# Patient Record
Sex: FEMALE | Race: WHITE | NOT HISPANIC OR LATINO | Employment: FULL TIME | URBAN - METROPOLITAN AREA
[De-identification: names, ages, dates, MRNs, and addresses within clinical notes are randomized per-mention and may not be internally consistent; named-entity substitution may affect disease eponyms.]

---

## 2017-01-13 RX ORDER — BUPROPION HYDROCHLORIDE 150 MG/1
TABLET ORAL
Qty: 30 TABLET | Refills: 0 | Status: SHIPPED | OUTPATIENT
Start: 2017-01-13 | End: 2017-02-14 | Stop reason: SDUPTHER

## 2017-02-14 ENCOUNTER — PATIENT MESSAGE (OUTPATIENT)
Dept: PSYCHIATRY | Facility: CLINIC | Age: 47
End: 2017-02-14

## 2017-02-14 RX ORDER — BUPROPION HYDROCHLORIDE 150 MG/1
TABLET ORAL
Qty: 30 TABLET | Refills: 0 | Status: SHIPPED | OUTPATIENT
Start: 2017-02-14 | End: 2017-03-28 | Stop reason: SDUPTHER

## 2017-03-28 ENCOUNTER — OFFICE VISIT (OUTPATIENT)
Dept: PSYCHIATRY | Facility: CLINIC | Age: 47
End: 2017-03-28
Payer: COMMERCIAL

## 2017-03-28 VITALS
HEIGHT: 68 IN | HEART RATE: 78 BPM | WEIGHT: 160 LBS | SYSTOLIC BLOOD PRESSURE: 121 MMHG | DIASTOLIC BLOOD PRESSURE: 74 MMHG | BODY MASS INDEX: 24.25 KG/M2

## 2017-03-28 DIAGNOSIS — F33.42 RECURRENT MAJOR DEPRESSIVE DISORDER, IN FULL REMISSION: Primary | ICD-10-CM

## 2017-03-28 DIAGNOSIS — F41.1 GENERALIZED ANXIETY DISORDER: ICD-10-CM

## 2017-03-28 PROCEDURE — 99999 PR PBB SHADOW E&M-EST. PATIENT-LVL II: CPT | Mod: PBBFAC,,, | Performed by: PSYCHIATRY & NEUROLOGY

## 2017-03-28 PROCEDURE — 1160F RVW MEDS BY RX/DR IN RCRD: CPT | Mod: S$GLB,,, | Performed by: PSYCHIATRY & NEUROLOGY

## 2017-03-28 PROCEDURE — 99214 OFFICE O/P EST MOD 30 MIN: CPT | Mod: S$GLB,,, | Performed by: PSYCHIATRY & NEUROLOGY

## 2017-03-28 RX ORDER — BUPROPION HYDROCHLORIDE 150 MG/1
150 TABLET ORAL DAILY
Qty: 30 TABLET | Refills: 5 | Status: SHIPPED | OUTPATIENT
Start: 2017-03-28 | End: 2017-10-02 | Stop reason: SDUPTHER

## 2017-03-28 RX ORDER — ESTRADIOL 1 MG/1
TABLET ORAL
Refills: 5 | COMMUNITY
Start: 2017-03-02 | End: 2017-06-14 | Stop reason: SDUPTHER

## 2017-03-28 NOTE — PROGRESS NOTES
"Ambulatory Psychiatry Established Patient Follow-up Note      Chief Complaint  presents for followup of depression, anxiety    Time Spent  25 minutes    HISTORY  Interval History  Mood is good.  One of her best friendsdied of cancer last week, Motivated her to make positive changes in her life. Also son went into the navy. Is taking an herbal supplement called Confidanzae by [employer], is an adaptogen. Feels much less anxious. Stopped lexapro a few months ago because feeling better. Her appetite has increased since starting it. Still taking wellbutrin. Denies significant alcohol use.     Prozac: tooth grinding, effexor: brain zaps if missing doses. Vistaril: hangover. Celexa and lexapro: excessive sedation, weight gain.     ROS   Complete review of systems performed covering Constitutional, Eyes, ENT/Mouth, Cardiovascular, Respiratory, Gastrointestinal, Genitourinary, Musculoskeletal, Skin, Neurologic, Endocrine, and Allergy/Immune. All systems were negative.    Psych ROS covered elsewhere in note (HPI)    PFSH  Past Medical History reviewed: Yes  Family History reviewed: No  Social History reviewed: Yes  Medications/problem list/allergies reviewed: Yes    Medications    Scheduled and PRN Medications     Current Outpatient Prescriptions:     buPROPion (WELLBUTRIN XL) 150 MG TB24 tablet, TAKE 1 TABLET BY MOUTH EVERY DAY, Disp: 30 tablet, Rfl: 0    duloxetine (CYMBALTA) 30 MG capsule, Take 1 capsule (30 mg total) by mouth once daily., Disp: 30 capsule, Rfl: 5    Allergies  Review of patient's allergies indicates:  No Known Allergies    EXAM  VITALS   Vitals:    03/28/17 1632   BP: 121/74   Pulse: 78   Weight: 72.6 kg (160 lb)   Height: 5' 8" (1.727 m)       RELEVANT LABS/STUDIES:    PSYCHIATRIC EXAMINATION  Appearance: well groomed, appearing healthy and of stated age    Behavior: cooperative, pleasant, no psychomotor retardation or agitation  Speech: normal rate, rhythm, prosody, volume and amount  Mood: " better  Affect: normal range  Thought Process: linear, logical, goal directed  Thought Content: negative for suicidal ideation, homicidal ideation, delusions or hallucinations.  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 Presentation consisten with Major Depressive Disorder, recurrent mild and Generalized Anxiety disorder. Pt also with extensive hx of adversity and losses which may have led to some negative coping skills including alcohol overuse which are worsening mood and anxiety sx. Perimenopausal mood changes likely contributing. Pt returns today with much improved mood,decreased anxiety, resolution of IBS and decreased alcohol use, following initiation of lexapro. However complaining of side effects of medications including weight gain, yawning and sedation even when taking at night. TRied wllbutrin to augment, pt reports good response to wellbutrin, stopped lexapro due to fatigue.      DIAGNOSES  Major Depressive Disorder, recurrent, in remission  Generalized Anxiety Disorder    R/O Alcohol use Disorder        PLAN  -  -continue wellbutrin  mg daily.  -counseled moderation of alcohol, AA  --Danie Gandhi  -return in three months for follow up.    More than 50% of the time was spent on counseling and coordination of care.  Supportive therapy, behavioral counseling on management of mood and anxiety

## 2017-06-14 RX ORDER — ESTRADIOL 1 MG/1
TABLET ORAL
Qty: 90 TABLET | Refills: 0 | Status: SHIPPED | OUTPATIENT
Start: 2017-06-14 | End: 2017-06-15 | Stop reason: SDUPTHER

## 2017-06-14 NOTE — TELEPHONE ENCOUNTER
----- Message from Carol Ann Beach sent at 6/14/2017  2:49 PM CDT -----  Contact: [phone]  PATIENT NEEDS REFILL OF ESTROGEN PILLS BEFORE HER APPOINTMENT DATE OF 7/27.  PLEASE CALL PATIENT TO REFILL SCRIPT.

## 2017-06-15 ENCOUNTER — OFFICE VISIT (OUTPATIENT)
Dept: FAMILY MEDICINE | Facility: CLINIC | Age: 47
End: 2017-06-15
Payer: COMMERCIAL

## 2017-06-15 VITALS
HEIGHT: 67 IN | DIASTOLIC BLOOD PRESSURE: 80 MMHG | WEIGHT: 153.63 LBS | RESPIRATION RATE: 16 BRPM | SYSTOLIC BLOOD PRESSURE: 120 MMHG | HEART RATE: 84 BPM | TEMPERATURE: 98 F | BODY MASS INDEX: 24.11 KG/M2 | OXYGEN SATURATION: 97 %

## 2017-06-15 DIAGNOSIS — Z79.890 HORMONE REPLACEMENT THERAPY: Primary | ICD-10-CM

## 2017-06-15 PROCEDURE — 99202 OFFICE O/P NEW SF 15 MIN: CPT | Mod: ,,, | Performed by: NURSE PRACTITIONER

## 2017-06-15 RX ORDER — ESTRADIOL 1 MG/1
1 TABLET ORAL DAILY
Qty: 90 TABLET | Refills: 0 | Status: SHIPPED | OUTPATIENT
Start: 2017-06-15 | End: 2017-07-13

## 2017-06-15 NOTE — PATIENT INSTRUCTIONS
Hormone Therapy for Women    Hormone therapy (HT) increases your levels of the hormones estrogen and progesterone. This can help reduce symptoms of menopause. HT may also help prevent osteoporosis in some women. But HT may increase risk for certain conditions, including blood clots, gallstones, heart disease, and stroke. However, HT may also reduce the risk of heart disease in some women.  How to take hormones  To get the best results, always take your hormones exactly as directed. Hormones can be taken in any of these ways:  · Pills containing estrogen, and sometimes other hormones, are taken as often as every day. This is the most common form of hormone therapy.  · A patch, spray, or gel releases estrogen into the bloodstream through the skin. There is also a patch that contains estrogen and progesterone. The patch can be worn on your hip. Most patches are changed once or twice a week.  · Cream used inside the vagina releases estrogen locally. Only a very small amount gets into the bloodstream. For this reason, vaginal creams can treat vaginal atrophy and dryness, but are not used to treat hot flashes. The creams do not significantly increase your risk for heart attack or stroke. The creams are usually used 2 to 3 times per week, but may be used daily. One type of vaginal ring (the Femring) does release enough estrogen that it gets into the bloodstream.  Follow-up visits  Have regular visits with a healthcare provider. These visits are a way to fine-tune your therapy. You can also be checked for any problems that might require you to stop HT.  Call your healthcare provider  If you have any of the following symptoms, call your healthcare provider:  · Unexpected vaginal bleeding  · A breast lump, or breast tenderness that doesnt go away  · Severe headaches  · Aching muscles in your back or legs  · Sudden pain in your legs or chest  · Shortness of breath   Date Last Reviewed: 5/10/2015  © 8721-6791 [employer]. [address] [place] [address], [place] [address]. All rights reserved. This information is not intended as a substitute for professional medical care. Always follow your healthcare professional's instructions.

## 2017-06-15 NOTE — PROGRESS NOTES
Subjective:       Patient ID: Saima Corral is a 46 y.o. female.    Chief Complaint: hormone therapy    45 y/o female presents to the clinic requesting a refill on her Estradiol until she can establish with another OBGYN. She denies any complaints or symptoms at this time.       Review of Systems   Constitutional: Negative for diaphoresis, fatigue and fever.   HENT: Negative for ear pain, rhinorrhea, sinus pressure, sore throat, trouble swallowing and voice change.    Eyes: Negative for photophobia and pain.   Respiratory: Negative for cough, chest tightness, shortness of breath and stridor.    Cardiovascular: Negative for chest pain, palpitations and leg swelling.   Gastrointestinal: Negative for abdominal pain, blood in stool, diarrhea, nausea and vomiting.   Genitourinary: Negative for dysuria, flank pain, frequency, pelvic pain, vaginal bleeding and vaginal discharge.   Musculoskeletal: Negative for joint swelling, neck pain and neck stiffness.   Skin: Negative for pallor and rash.   Neurological: Negative for dizziness, syncope, speech difficulty, weakness and numbness.   Psychiatric/Behavioral: Negative for confusion and suicidal ideas.       Past Medical History:   Diagnosis Date    Anxiety     Depression        Past Surgical History:   Procedure Laterality Date    HYSTERECTOMY      vaginal sling         History reviewed. No pertinent family history.    Social History     Social History    Marital status: Single     Spouse name: N/A    Number of children: N/A    Years of education: N/A     Social History Main Topics    Smoking status: Former Smoker    Smokeless tobacco: None    Alcohol use None    Drug use: Unknown    Sexual activity: Not Asked     Other Topics Concern    None     Social History Narrative    None       Current Outpatient Prescriptions   Medication Sig Dispense Refill    buPROPion (WELLBUTRIN XL) 150 MG TB24 tablet Take 1 tablet (150 mg total) by mouth once daily. 30 tablet 5     estradiol (ESTRACE) 1 MG tablet Take 1 tablet (1 mg total) by mouth once daily. 90 tablet 0     No current facility-administered medications for this visit.        Review of patient's allergies indicates:  No Known Allergies  Objective:       Vitals:    06/15/17 0920   BP: 120/80   Pulse: 84   Resp: 16   Temp: 98.4 °F (36.9 °C)     Physical Exam   Constitutional: She is oriented to person, place, and time. She appears well-developed and well-nourished.   HENT:   Head: Normocephalic and atraumatic.   Eyes: Conjunctivae and EOM are normal. Pupils are equal, round, and reactive to light.   Neck: Normal range of motion. Neck supple.   Cardiovascular: Normal rate, regular rhythm, normal heart sounds and intact distal pulses.    Pulmonary/Chest: Effort normal and breath sounds normal.   Musculoskeletal: Normal range of motion.   Neurological: She is alert and oriented to person, place, and time.   Skin: Skin is warm and dry. Capillary refill takes less than 2 seconds.   Psychiatric: She has a normal mood and affect. Her behavior is normal. Judgment and thought content normal.   Nursing note and vitals reviewed.      Assessment:       1. Hormone replacement therapy        Plan:     Hormone replacement therapy  -     estradiol (ESTRACE) 1 MG tablet; Take 1 tablet (1 mg total) by mouth once daily.  Dispense: 90 tablet; Refill: 0  -     Referral to Ramonita Caor MD (OBGYN)    Return if symptoms worsen or fail to improve.

## 2017-09-10 RX ORDER — ESTRADIOL 1 MG/1
TABLET ORAL
Qty: 90 TABLET | Refills: 0 | Status: SHIPPED | OUTPATIENT
Start: 2017-09-10 | End: 2018-03-29 | Stop reason: SDUPTHER

## 2017-10-02 RX ORDER — BUPROPION HYDROCHLORIDE 150 MG/1
TABLET ORAL
Qty: 30 TABLET | Refills: 0 | Status: SHIPPED | OUTPATIENT
Start: 2017-10-02 | End: 2017-11-13 | Stop reason: SDUPTHER

## 2017-11-13 RX ORDER — BUPROPION HYDROCHLORIDE 150 MG/1
TABLET ORAL
Qty: 30 TABLET | Refills: 0 | Status: SHIPPED | OUTPATIENT
Start: 2017-11-13 | End: 2018-01-24 | Stop reason: SDUPTHER

## 2018-01-24 ENCOUNTER — PATIENT MESSAGE (OUTPATIENT)
Dept: PSYCHIATRY | Facility: CLINIC | Age: 48
End: 2018-01-24

## 2018-01-24 RX ORDER — BUPROPION HYDROCHLORIDE 150 MG/1
TABLET ORAL
Qty: 30 TABLET | Refills: 0 | Status: SHIPPED | OUTPATIENT
Start: 2018-01-24 | End: 2018-01-25 | Stop reason: SDUPTHER

## 2018-01-25 RX ORDER — BUPROPION HYDROCHLORIDE 150 MG/1
TABLET ORAL
Qty: 30 TABLET | Refills: 0 | Status: SHIPPED | OUTPATIENT
Start: 2018-01-25 | End: 2018-04-17

## 2018-03-29 RX ORDER — ESTRADIOL 1 MG/1
TABLET ORAL
Qty: 30 TABLET | Refills: 0 | Status: SHIPPED | OUTPATIENT
Start: 2018-03-29 | End: 2018-04-17 | Stop reason: SDUPTHER

## 2018-04-17 ENCOUNTER — OFFICE VISIT (OUTPATIENT)
Dept: FAMILY MEDICINE | Facility: CLINIC | Age: 48
End: 2018-04-17
Payer: COMMERCIAL

## 2018-04-17 VITALS
BODY MASS INDEX: 24.33 KG/M2 | RESPIRATION RATE: 16 BRPM | DIASTOLIC BLOOD PRESSURE: 78 MMHG | OXYGEN SATURATION: 97 % | WEIGHT: 155 LBS | HEIGHT: 67 IN | SYSTOLIC BLOOD PRESSURE: 118 MMHG | HEART RATE: 86 BPM

## 2018-04-17 DIAGNOSIS — Z71.85 ENCOUNTER FOR COUNSELING REGARDING IMMUNIZATION: ICD-10-CM

## 2018-04-17 DIAGNOSIS — N95.1 HOT FLASHES DUE TO MENOPAUSE: Primary | ICD-10-CM

## 2018-04-17 DIAGNOSIS — Z12.31 ENCOUNTER FOR SCREENING MAMMOGRAM FOR BREAST CANCER: ICD-10-CM

## 2018-04-17 PROCEDURE — 90715 TDAP VACCINE 7 YRS/> IM: CPT | Mod: ,,, | Performed by: INTERNAL MEDICINE

## 2018-04-17 PROCEDURE — 99213 OFFICE O/P EST LOW 20 MIN: CPT | Mod: 25,,, | Performed by: INTERNAL MEDICINE

## 2018-04-17 PROCEDURE — 90471 IMMUNIZATION ADMIN: CPT | Mod: ,,, | Performed by: INTERNAL MEDICINE

## 2018-04-17 RX ORDER — PHENTERMINE HYDROCHLORIDE 37.5 MG/1
37.5 TABLET ORAL EVERY MORNING
Refills: 0 | COMMUNITY
Start: 2018-03-05 | End: 2018-04-17

## 2018-04-17 RX ORDER — ESTRADIOL 1 MG/1
1 TABLET ORAL DAILY
Qty: 90 TABLET | Refills: 3 | Status: SHIPPED | OUTPATIENT
Start: 2018-04-17 | End: 2019-06-19 | Stop reason: SDUPTHER

## 2018-04-17 NOTE — PATIENT INSTRUCTIONS
Menopause: Effects of Low Estrogen Levels  When your estrogen level falls, you may have symptoms. You also may be at a greater risk for osteoporosis and heart disease. Your diet, family health history, lifestyle, and other factors affect your symptoms and risks.  Symptoms of low estrogen  · Flashes, flushes, and night sweats are the most common symptoms of low estrogen. At times, blood rushes to your skins surface. This can give you a feeling of warmth (hot flash). Your face may look flushed. Hot flashes while you are sleeping are called night sweats.  · Mood swings are another effect of low estrogen. You may feel sad, anxious, or frustrated. Shifting hormone levels and night sweats may disrupt your sleep. This can cause fatigue, which may make mood swings worse.  · Thinning tissues may cause discomfort. Skin may appear more wrinkled. Thinning in the urinary tract may lead to bladder infections. You may also have an urgent need to urinate. Or, you may lose bladder control (incontinence). Thinning of the vagina may cause dryness and painful sex.  Major health risks of low estrogen  Osteoporosis: Estrogen helps maintain strong bones by preventing calcium loss. Too little calcium can increase the risk of fractures in the spine, hips, and leg and arm bones. Women who drink a lot of alcohol, who smoke, who are not active, and who are thin or petite are at greater risk. A family history of osteoporosis may also increase risk.  Heart disease: Estrogen made by the body seems to protect against heart disease. It may do this by raising the level of HDL (good) cholesterol in the blood. After menopause, the risk of heart disease rises sharply. Talk to your doctor about ways to protect your heart health.  How HT helps  Hormone therapy (HT) replaces hormones your body no longer produces. Because of this, it reduces some symptoms of menopause that are linked to low hormone levels. HT may also help prevent osteoporosis in some  women. But it may increase the risk of other health conditions, including heart disease, breast cancer, and stroke. You may not need HT--not all women do. Talk to your doctor about whether or not HT is right for you.   Date Last Reviewed: 5/13/2015 © 2000-2017 [employer]. [address] [place] [address], [place] [address]. All rights reserved. This information is not intended as a substitute for professional medical care. Always follow your healthcare professional's instructions.        Hormone Therapy for Women    Hormone therapy (HT) increases the levels of the hormones estrogen and progesterone in your body. This can help reduce symptoms of menopause. HT may also help prevent osteoporosis in some women. But HT may increase risk for certain conditions, including blood clots, gallstones, heart disease, and stroke. However, HT may also reduce the risk of heart disease in some women.  How to take hormones  To get the best results, always take your hormones exactly as directed. Hormones can be taken in any of these ways:  · Pills containing estrogen, and sometimes other hormones, are taken as often as every day. This is the most common form of hormone therapy.  · A patch, spray, or gel releases estrogen into the bloodstream through the skin. There is also a patch that contains estrogen and progesterone. The patch can be worn on your hip. Most patches are changed once or twice a week.  · Vaginal ring containing estrogen.  · Cream used inside the vagina releases estrogen locally. Only a very small amount gets into the bloodstream. For this reason, vaginal creams can treat vaginal atrophy and dryness, but are not used to treat hot flashes. The creams do not significantly increase your risk for heart attack or stroke. However, if used more than twice a week in other than very small doses, estrogen does get into the bloodstream in significant amounts. This may affect the uterus if present.  · Prolonged exposure of  estrogen in the blood without the use of a progestin increases the risk of cancer of the uterus.  Follow up visits  Have regular visits with a healthcare provider. These visits are a way to fine-tune your therapy. You can also be checked for any problems that might require you to stop HT.  Call your healthcare provider  If you have any of the following symptoms, call your healthcare provider:  · Unexpected vaginal bleeding  · A breast lump, or breast tenderness that doesnt go away  · Severe headaches  · Aching muscles in your back or legs  · Sudden pain in your legs or chest  · Shortness of breath   Date Last Reviewed: 2/1/2017  © 1778-9032 [employer]. [address] [place] [address], [place] [address]. All rights reserved. This information is not intended as a substitute for professional medical care. Always follow your healthcare professional's instructions.

## 2018-04-17 NOTE — PROGRESS NOTES
Subjective:       Patient ID: Saima Corral is a 47 y.o. female.    Chief Complaint: Medication Refill    Ms. Landaverde is a pleasant 47-year-old  female who works as a registered nurse at [employer]. She comes for follow-up.    Thus far she has not been diagnosed with any major medical issues like hypertension, diabetes or arthritis. She had a lipid panel checked in 2014 which had shown a HDL cholesterol of 120 and LDL cholesterol of less than 70.    She did have a partial hysterectomy in past and does get hot flashes. She takes estradiol for the same.    On social front-she did go through a phase of depression and anxiety in her life to a tumultuous and rough marriage. She is currently in the process of divorce and with a change in her job situation to clinical care she finds redemption and happiness. She has 3 grown children.    She is due for preventive care measures like mammogram and tetanus vaccination. [facility] influenza [facility].        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     Past Surgical History:   Procedure Laterality Date    HYSTERECTOMY      KNEE SURGERY      SHOULDER SURGERY      vaginal sling       Family History   Problem Relation Age of Onset    Asthma Mother     Depression Mother     Hypertension Mother     Heart disease Father  "    Depression Father     Hypertension Father        Review of Systems   Constitutional: Negative for activity change, chills, fatigue, fever and unexpected weight change.   HENT: Negative for congestion, postnasal drip and sinus pressure.    Eyes: Negative for pain, discharge and visual disturbance.   Respiratory: Negative for cough, chest tightness and shortness of breath.    Cardiovascular: Negative for chest pain, palpitations and leg swelling.   Gastrointestinal: Negative for abdominal distention, anal bleeding, constipation and diarrhea.   Genitourinary: Negative for difficulty urinating, dysuria, flank pain, frequency, menstrual problem, pelvic pain, vaginal bleeding and vaginal pain.   Musculoskeletal: Negative for arthralgias and joint swelling.   Skin: Negative for color change, pallor and rash.   Allergic/Immunologic: Negative for environmental allergies, food allergies and immunocompromised state.   Neurological: Negative for dizziness, tremors, seizures, syncope, light-headedness and headaches.   Hematological: Negative for adenopathy. Does not bruise/bleed easily.   Psychiatric/Behavioral: Negative for agitation, confusion and dysphoric mood. The patient is not nervous/anxious.        Objective:       Vitals:    04/17/18 1306 04/17/18 1344   BP: (!) 128/90 118/78   Pulse: 86    Resp: 16    SpO2: 97%    Weight: 70.3 kg (155 lb)    Height: 5' 7" (1.702 m)      Physical Exam   Constitutional: She is oriented to person, place, and time. She appears well-developed and well-nourished. She is cooperative. No distress.   HENT:   Head: Normocephalic and atraumatic.   Eyes: Conjunctivae, EOM and lids are normal. Pupils are equal, round, and reactive to light. Lids are everted and swept, no foreign bodies found. Right pupil is round and reactive. Left pupil is round and reactive.   Neck: Trachea normal and normal range of motion. Neck supple.       Right neck scar secondary to anterior cervical disc fusion "   Cardiovascular: Normal rate, regular rhythm, S1 normal, S2 normal and normal heart sounds.    Pulmonary/Chest: Breath sounds normal.   Abdominal: Soft. Bowel sounds are normal. There is no rigidity and no guarding.   Musculoskeletal: Normal range of motion.   Lymphadenopathy:     She has no cervical adenopathy.     She has no axillary adenopathy.        Right axillary: No pectoral adenopathy present.        Left axillary: No pectoral adenopathy present.       Right: Inguinal adenopathy present. No supraclavicular adenopathy present.        Left: Inguinal adenopathy present. No supraclavicular adenopathy present.   Nonspecific lymph nodes in the right inguinal region. Very nonspecific minimally palpable in the left inguinal region also.   Neurological: She is alert and oriented to person, place, and time.   Skin: Skin is warm and dry.   Psychiatric: She has a normal mood and affect. Her behavior is normal. Judgment and thought content normal.   Nursing note and vitals reviewed.      Assessment:       1. Hot flashes due to menopause    2. Encounter for screening mammogram for breast cancer    3. Encounter for counseling regarding immunization         Plan:           Hot flashes due to menopause  -     estradiol (ESTRACE) 1 MG tablet; Take 1 tablet (1 mg total) by mouth once daily.  Dispense: 90 tablet; Refill: 3    Encounter for screening mammogram for breast cancer  -     Mammo Digital Screening Bilat with CAD; Future; Expected date: 04/17/2018    Encounter for counseling regarding immunization  -     (In Office Administered) Tdap Vaccine    I did take the opportunity to review patient's lipid panel also. This is excellent with elevated HDL. Her blood glucose was also normal. This was done as a part of annual check offered by her employer [employer].    Follow-up in one-year time for annual physical.    Patient examined in presence of Ms. Nicole escobedo.

## 2018-05-24 ENCOUNTER — OFFICE VISIT (OUTPATIENT)
Dept: FAMILY MEDICINE | Facility: CLINIC | Age: 48
End: 2018-05-24
Payer: COMMERCIAL

## 2018-05-24 VITALS
HEIGHT: 67 IN | HEART RATE: 78 BPM | WEIGHT: 156 LBS | DIASTOLIC BLOOD PRESSURE: 74 MMHG | SYSTOLIC BLOOD PRESSURE: 120 MMHG | OXYGEN SATURATION: 99 % | BODY MASS INDEX: 24.48 KG/M2

## 2018-05-24 DIAGNOSIS — M62.830 MUSCLE SPASM OF BACK: ICD-10-CM

## 2018-05-24 DIAGNOSIS — M54.42 ACUTE MIDLINE LOW BACK PAIN WITH LEFT-SIDED SCIATICA: Primary | ICD-10-CM

## 2018-05-24 PROCEDURE — 99213 OFFICE O/P EST LOW 20 MIN: CPT | Mod: ,,, | Performed by: NURSE PRACTITIONER

## 2018-05-24 RX ORDER — METHOCARBAMOL 500 MG/1
500 TABLET, FILM COATED ORAL 2 TIMES DAILY
Qty: 60 TABLET | Refills: 0 | Status: SHIPPED | OUTPATIENT
Start: 2018-05-24 | End: 2018-08-28 | Stop reason: SDUPTHER

## 2018-05-24 NOTE — PATIENT INSTRUCTIONS
Back Care Tips    Caring for your back  These are things you can do to prevent a recurrence of acute back pain and to reduce symptoms from chronic back pain:  · Maintain a healthy weight. If you are overweight, losing weight will help most types of back pain.  · Exercise is an important part of recovery from most types of back pain. The muscles behind and in front of the spine support the back. This means strengthening both the back muscles and the abdominal muscles will provide better support for your spine.   · Swimming and brisk walking are good overall exercises to improve your fitness level.  · Practice safe lifting methods (below).  · Practice good posture when sitting, standing and walking. Avoid prolonged sitting. This puts more stress on the lower back than standing or walking.  · Wear quality shoes with sufficient arch support. Foot and ankle alignment can affect back symptoms. Women should avoid wearing high heels.  · Therapeutic massage can help relax the back muscles without stretching them.  · During the first 24 to 72 hours after an acute injury or flare-up of chronic back pain, apply an ice pack to the painful area for 20 minutes and then remove it for 20 minutes, over a period of 60 to 90 minutes, or several times a day. As a safety precaution, do not use a heating pad at bedtime. Sleeping on a heating pad can lead to skin burns or tissue damage.  · You can alternate ice and heat therapies.  Medications  Talk to your healthcare provider before using medicines, especially if you have other medical problems or are taking other medicines.  · You may use acetaminophen or ibuprofen to control pain, unless your healthcare provider prescribed other pain medicine. If you have chronic conditions like diabetes, liver or kidney disease, stomach ulcers, or gastrointestinal bleeding, or are taking blood thinners, talk with your healthcare provider before taking any medicines.  · Be careful if you are given  prescription pain medicines, narcotics, or medicine for muscle spasm. They can cause drowsiness, affect your coordination, reflexes, and judgment. Do not drive or operate heavy machinery while taking these types of medicines. Take prescription pain medicine only as prescribed by your healthcare provider.  Lumbar stretch  Here is a simple stretching exercise that will help relax muscle spasm and keep your back more limber. If exercise makes your back pain worse, dont do it.  · Lie on your back with your knees bent and both feet on the ground.  · Slowly raise your left knee to your chest as you flatten your lower back against the floor. Hold for 5 seconds.  · Relax and repeat the exercise with your right knee.  · Do 10 of these exercises for each leg.  Safe lifting method  · Dont bend over at the waist to lift an object off the floor.  Instead, bend your knees and hips in a squat.   · Keep your back and head upright  · Hold the object close to your body, directly in front of you.  · Straighten your legs to lift the object.   · Lower the object to the floor in the reverse fashion.  · If you must slide something across the floor, push it.  Posture tips  Sitting  Sit in chairs with straight backs or low-back support. Keep your knees lower than your hips, with your feet flat on the floor.  When driving, sit up straight. Adjust the seat forward so you are not leaning toward the steering wheel.  A small pillow or rolled towel behind your lower back may help if you are driving long distances.   Standing  When standing for long periods, shift most of your weight to one leg at a time. Alternate legs every few minutes.   Sleeping  The best way to sleep is on your side with your knees bent. Put a low pillow under your head to support your neck in a neutral spine position. Avoid thick pillows that bend your neck to one side. Put a pillow between your legs to further relax your lower back. If you sleep on your back, put pillows  under your knees to support your legs in a slightly flexed position. Use a firm mattress. If your mattress sags, replace it, or use a 1/2-inch plywood board under the mattress to add support.  Follow-up care  Follow up with your healthcare provider, or as advised.  If X-rays, a CT scan or an MRI scan were taken, they will be reviewed by a radiologist. You will be notified of any new findings that may affect your care.  Call 911  Seek emergency medical care if any of the following occur:  · Trouble breathing  · Confusion  · Very drowsy  · Fainting or loss of consciousness  · Rapid or very slow heart rate  · Loss of  bowel or bladder control  When to seek medical care  Call your healthcare provider if any of the following occur:  · Pain becomes worse or spreads to your arms or legs  · Weakness or numbness in one or both arms or legs  · Numbness in the groin area  Date Last Reviewed: 6/1/2016  © 4181-1267 [employer]. [address] [place] [address]. All rights reserved. This information is not intended as a substitute for professional medical care. Always follow your healthcare professional's instructions.        General Neck and Back Pain    Both neck and back pain are usually caused by injury to the muscles or ligaments of the spine. Sometimes the disks that separate each bone of the spine may cause pain by pressing on a nearby nerve. Back and neck pain may appear after a sudden twisting or bending force (such as in a car accident), or sometimes after a simple awkward movement. In either case, muscle spasm is often present and adds to the pain.  Acute neck and back pain usually gets better in 1 to 2 weeks. Pain related to disk disease, arthritis in the spinal joints or spinal stenosis (narrowing of the spinal canal) can become chronic and last for months or years.  Back and neck pain are common problems. Most people feel better in 1 or 2 weeks, and most of the rest in 1 to 2 months. Most  people can remain active.  People experience and describe pain differently.  · Pain can be sharp, stabbing, shooting, aching, cramping, or burning  · Movement, standing, bending, lifting, sitting, or walking may worsen the pain  · Pain can be localized to one spot or area, or it can be more generalized  · Pain can spread or radiate upwards, downwards, to the front, or go down your arms  · Muscle spasm may occur.  Most of the time mechanical problems with the muscles or spine cause the pain. it is usually caused by an injury, whether known or not, to the muscles or ligaments. While illnesses can cause back pain, it is usually not caused by a serious illness. Pain is usually related to physical activity, whether sports, exercise, work, or normal activity. Sometimes it can occur without an identifiable cause. This can happen simply by stretching or moving wrong, without noting pain at the time. Other causes include:  · Overexertion, lifting, pushing, pulling incorrectly or too aggressively.  · Sudden twisting, bending or stretching from an accident (car or fall), or accidental movement.  · Poor posture  · Poor conditioning, lack of regular exercise  · Spinal disc disease or arthritis  · Stress  · Pregnancy, or illness like appendicitis, bladder or kidney infection, pelvic infections   Home care  · For neck pain: Use a comfortable pillow that supports the head and keeps the spine in a neutral position. The position of the head should not be tilted forward or backward.  · When in bed, try to find a position of comfort. A firm mattress is best. Try lying flat on your back with pillows under your knees. You can also try lying on your side with your knees bent up towards your chest and a pillow between your knees.  · At first, do not try to stretch out the sore spots. If there is a strain, it is not like the good soreness you get after exercising without an injury. In this case, stretching may make it worse.  · Avoid  prolonged sitting, long car rides or travel. This puts more stress on the lower back than standing or walking.  · During the first 24 to 72 hours after an injury, apply an ice pack to the painful area for 20 minutes and then remove it for 20 minutes over a period of 60 to 90 minutes or several times a day.   · You can alternate ice and heat therapies. Talk with your healthcare provider about the best treatment for your back or neck pain. As a safety precaution, do not use a heating pad at bedtime. Sleeping with a heating pad can lead to skin burns or tissue damage.  · Therapeutic massage can help relax the back and neck muscles without stretching them.  · Be aware of safe lifting methods and do not lift anything over 15 pounds until all the pain is gone.  Medications  Talk to your healthcare provider before using medicine, especially if you have other medical problems or are taking other medicines.  · You may use over-the-counter medicine to control pain, unless another pain medicine was prescribed. If you have chronic conditions like diabetes, liver or kidney disease, stomach ulcers,  gastrointestinal bleeding, or are taking blood thinner medicines.  · Be careful if you are given pain medicines, narcotics, or medicine for muscle spasm. They can cause drowsiness, and can affect your coordination, reflexes, and judgment. Do not drive or operate heavy machinery.  Follow-up care  Follow up with your healthcare provider, or as advised. Physical therapy or further tests may be needed.  If X-rays were taken, you will be notified of any new findings that may affect your care.  Call 911  Seek emergency medical care if any of the following occur:  · Trouble breathing  · Confusion  · Very drowsy or trouble awakening  · Fainting or loss of consciousness  · Rapid or very slow heart rate  · Loss of bowel or bladder control  When to seek medical advice  Call your healthcare provider right away if any of these occur:  · Pain  becomes worse or spreads into your arms or legs  · Weakness, numbness or pain in one or both arms or legs  · Numbness in the groin area  · Difficulty walking  · Fever of 100.4ºF (38ºC) or higher, or as directed by your healthcare provider  Date Last Reviewed: 7/1/2016  © 3019-3431 [employer]. [address] [place] [address]. All rights reserved. This information is not intended as a substitute for professional medical care. Always follow your healthcare professional's instructions.

## 2018-05-24 NOTE — PROGRESS NOTES
Subjective:       Patient ID: Saima Corral is a 47 y.o. female.    Chief Complaint: Back Pain (last night pain scale was at a 9)    Patient is new to me.  Patient presents with low back pain for 3 weeks.  Patient states she flew on an airplane 3 weeks ago and back pain has been persistent since that time.  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  Patient complains of midline low back pain with muscle spasms to the left side and radiation to the left buttock and down the leg.  NSAIDs help somewhat.      Back Pain   This is a new problem. The current episode started 1 to 4 weeks ago. The problem occurs daily. The problem has been waxing and waning since onset. The pain is present in the lumbar spine. The quality of the pain is described as aching. Radiates to: left buttock. The pain is at a severity of 6/10 (4-9 on a 0-10 pain scale depending on activity level). The pain is moderate. The pain is worse during the night. The symptoms are aggravated by stress, bending, sitting and twisting. Pertinent negatives include no abdominal pain, bladder incontinence, bowel incontinence, chest pain, dysuria, fever, headaches, leg pain, numbness, paresis, paresthesias, pelvic pain, perianal numbness, tingling, weakness or weight loss. Risk factors include lack of exercise. She has tried heat, NSAIDs, home exercises, analgesics and bed rest for the symptoms. The treatment provided mild relief.     Review of Systems   Constitutional: Negative for activity change, appetite change, fever and weight loss.   HENT: Negative for congestion, ear discharge, ear pain, sore throat, trouble swallowing and voice change.    Eyes: Negative for photophobia, pain, discharge and visual disturbance.   Respiratory: Negative for cough, chest tightness and shortness of breath.    Cardiovascular: Negative for chest pain and palpitations.   Gastrointestinal: Negative for abdominal pain, bowel incontinence, nausea and  vomiting.   Endocrine: Negative for cold intolerance and heat intolerance.   Genitourinary: Negative for bladder incontinence, difficulty urinating, dysuria and pelvic pain.   Musculoskeletal: Positive for back pain. Negative for arthralgias and gait problem.   Skin: Negative for rash.   Allergic/Immunologic: Negative for immunocompromised state.   Neurological: Negative for tingling, speech difficulty, weakness, numbness, headaches and paresthesias.   Psychiatric/Behavioral: Negative for confusion, self-injury and suicidal ideas.       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       Current Outpatient Prescriptions   Medication Sig Dispense Refill    estradiol (ESTRACE) 1 MG tablet Take 1 tablet (1 mg total) by mouth once daily. 90 tablet 3    methocarbamol (ROBAXIN) 500 MG Tab Take 1 tablet (500 mg total) by mouth 2 (two) times daily. 60 tablet 0     No current facility-administered medications for this visit.        Review of patient's allergies indicates:  No Known Allergies  Objective:    HPI   "   Back Pain    Additional comments: last night pain scale was at a 9       Last edited by Nicole Bonilla LPN on 5/24/2018 10:41 AM. (History)      Blood pressure 120/74, pulse 78, height 5' 7" (1.702 m), weight 70.8 kg (156 lb), SpO2 99 %. Body mass index is 24.43 kg/m².   Physical Exam   Constitutional: She is oriented to person, place, and time. She appears well-developed. She is cooperative. No distress.   HENT:   Head: Normocephalic and atraumatic.   Right Ear: Tympanic membrane normal.   Left Ear: Tympanic membrane normal.   Eyes: Conjunctivae, EOM and lids are normal. Pupils are equal, round, and reactive to light. Lids are everted and swept, no foreign bodies found. Right pupil is round and reactive. Left pupil is round and reactive.   Neck: Trachea normal and normal range of motion. Neck supple.   Cardiovascular: Normal rate, regular rhythm, S1 normal, S2 normal, normal heart sounds and intact distal pulses.    Pulmonary/Chest: Effort normal and breath sounds normal.   Abdominal: Soft. Bowel sounds are normal. There is no rigidity and no guarding.   Musculoskeletal: Normal range of motion.        Left upper leg: She exhibits tenderness. She exhibits no edema.        Legs:  Muscular tightness noted to lateral area of pain (left sided) on the low back.    Straight leg test positive for pain on the left leg.  Right side negative.   Lymphadenopathy:     She has no cervical adenopathy.     She has no axillary adenopathy.   Neurological: She is alert and oriented to person, place, and time.   Skin: Skin is warm and dry. Capillary refill takes less than 2 seconds.   Psychiatric: She has a normal mood and affect. Her behavior is normal. Judgment and thought content normal.   Nursing note and vitals reviewed.          Assessment:       1. Acute midline low back pain with left-sided sciatica    2. Muscle spasm of back        Plan:       Saima was seen today for back pain.    Diagnoses and all orders for this " visit:    Acute midline low back pain with left-sided sciatica  Physical therapy recommended but patient denies as she states she does not have the time for physical therapy.    Muscle spasm of back  -     methocarbamol (ROBAXIN) 500 MG Tab; Take 1 tablet (500 mg total) by mouth 2 (two) times daily.       Try muscle relaxer and light stretching for the next 4-6 weeks.  If worsening severely at any point or if not improved by 6 weeks return to the clinic for re-evaluation and possible imaging.

## 2018-08-28 ENCOUNTER — OFFICE VISIT (OUTPATIENT)
Dept: FAMILY MEDICINE | Facility: CLINIC | Age: 48
End: 2018-08-28
Payer: COMMERCIAL

## 2018-08-28 VITALS
SYSTOLIC BLOOD PRESSURE: 103 MMHG | DIASTOLIC BLOOD PRESSURE: 80 MMHG | HEIGHT: 67 IN | WEIGHT: 157 LBS | BODY MASS INDEX: 24.64 KG/M2 | HEART RATE: 85 BPM

## 2018-08-28 DIAGNOSIS — M62.830 MUSCLE SPASM OF BACK: ICD-10-CM

## 2018-08-28 DIAGNOSIS — M54.42 CHRONIC LEFT-SIDED LOW BACK PAIN WITH LEFT-SIDED SCIATICA: Primary | ICD-10-CM

## 2018-08-28 DIAGNOSIS — G89.29 CHRONIC LEFT-SIDED LOW BACK PAIN WITH LEFT-SIDED SCIATICA: Primary | ICD-10-CM

## 2018-08-28 PROCEDURE — 99214 OFFICE O/P EST MOD 30 MIN: CPT | Mod: ,,, | Performed by: INTERNAL MEDICINE

## 2018-08-28 RX ORDER — METHOCARBAMOL 500 MG/1
500 TABLET, FILM COATED ORAL DAILY
Qty: 30 TABLET | Refills: 1 | Status: SHIPPED | OUTPATIENT
Start: 2018-08-28 | End: 2019-05-20

## 2018-08-28 RX ORDER — MELOXICAM 15 MG/1
1 TABLET ORAL DAILY PRN
Refills: 0 | COMMUNITY
Start: 2018-07-26 | End: 2019-05-20

## 2018-08-28 RX ORDER — CYCLOBENZAPRINE HCL 10 MG
10 TABLET ORAL NIGHTLY
Qty: 30 TABLET | Refills: 1 | Status: SHIPPED | OUTPATIENT
Start: 2018-08-28 | End: 2019-08-19 | Stop reason: ALTCHOICE

## 2018-08-28 RX ORDER — CYCLOBENZAPRINE HCL 10 MG
1 TABLET ORAL DAILY PRN
Refills: 0 | COMMUNITY
Start: 2018-07-26 | End: 2018-08-28 | Stop reason: SDUPTHER

## 2018-08-28 RX ORDER — TRAMADOL HYDROCHLORIDE 50 MG/1
1 TABLET ORAL DAILY PRN
Refills: 0 | COMMUNITY
Start: 2018-07-26 | End: 2018-08-28 | Stop reason: SDUPTHER

## 2018-08-28 RX ORDER — TRAMADOL HYDROCHLORIDE 50 MG/1
50 TABLET ORAL EVERY 12 HOURS PRN
Qty: 15 TABLET | Refills: 0 | Status: SHIPPED | OUTPATIENT
Start: 2018-08-28 | End: 2019-05-20

## 2018-08-28 NOTE — PROGRESS NOTES
Subjective:       Patient ID: Saima Corral is a 48 y.o. female.    Chief Complaint: Back Pain (x 3 months )    Ms. Saima Corral is a pleasant 48-year-old  female who has been suffering from low back pain mostly on the left side for more than several months. She does not clearly recall any clear-cut history of trauma except for falling in a bathtub perhaps an 9-year-old so back. She did have degenerative arthritis of the neck for which she had ACDF done by Dr. Pineda years ago.    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    She is status post hysterectomy. There is no history of cancer. No fever. No bowel or bladder disturbance. No numbness or paresthesias. No weight loss or significant weight gain.    She has not tried any formal physical therapy because of lack of time. She has tried some over-the-counter medications and in past she has found relief from Robaxin taken in the morning and Flexeril in the evening which helped to induce sleep. Since she has run out of Flexeril at this point she has resorted to drinking some alcohol to help her rest. No illicit substance abuse.      Back Pain   This is a recurrent problem. The current episode started more than 1 month ago. The problem occurs constantly. The problem has been waxing and waning since onset. The pain is present in the lumbar spine and sacro-iliac. The quality of the pain is described as aching and cramping. The pain radiates to the left thigh. The pain is moderate. The pain is worse during the day (when bending to pick pts). Pertinent negatives include no abdominal pain, chest pain, dysuria, fever, headaches, numbness, pelvic pain or weakness. Risk factors: H/O fall years ago in bath tub. She has tried analgesics for the symptoms. The treatment provided mild relief.       Past Medical History:   Diagnosis Date    Anxiety     Depression      Social History      Socioeconomic History    Marital status:      Spouse name: Not on file    Number of children: 3    Years of education: Not on file    Highest education level: Not on file   Social Needs    Financial resource strain: Not on file    Food insecurity - worry: Not on file    Food insecurity - inability: Not on file    Transportation needs - medical: Not on file    Transportation needs - non-medical: Not on file   Occupational History    Occupation: Registered Nurse      Employer: [employer]     Comment: Cancer Center   Tobacco Use    Smoking status: Former Smoker     Last attempt to quit: 2007     Years since quittin.6    Smokeless tobacco: Never Used   Substance and Sexual Activity    Alcohol use: Yes    Drug use: No    Sexual activity: Yes     Partners: Male   Other Topics Concern    Not on file   Social History Narrative    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     Past Surgical History:   Procedure Laterality Date    HYSTERECTOMY      KNEE SURGERY      SHOULDER SURGERY      vaginal sling       Family History   Problem Relation Age of Onset    Asthma Mother     Depression Mother     Hypertension Mother     Heart disease Father     Depression Father     Hypertension Father        Review of Systems   Constitutional: Negative for activity change, appetite change and fever.   HENT: Negative for congestion, ear discharge, ear pain, sore throat, trouble swallowing and voice change.    Eyes: Negative for photophobia, pain, discharge and visual disturbance.   Respiratory: Negative for cough, chest tightness and shortness of breath.    Cardiovascular: Negative for chest pain and palpitations.   Gastrointestinal: Negative for abdominal pain, nausea and vomiting.   Endocrine: Negative for cold intolerance and heat intolerance.   Genitourinary: Negative for difficulty urinating, dysuria and pelvic pain.   Musculoskeletal: Positive for back pain.  "Negative for arthralgias and gait problem.   Skin: Negative for rash.   Allergic/Immunologic: Negative for immunocompromised state.   Neurological: Negative for speech difficulty, weakness, numbness and headaches.   Psychiatric/Behavioral: Negative for confusion, self-injury and suicidal ideas.         Objective:      Blood pressure 103/80, pulse 85, height 5' 7" (1.702 m), weight 71.2 kg (157 lb). Body mass index is 24.59 kg/m².  Physical Exam   Constitutional: She appears well-developed and well-nourished. She is cooperative. No distress.   HENT:   Head: Normocephalic and atraumatic.   Eyes: Conjunctivae and lids are normal. Pupils are equal, round, and reactive to light. Lids are everted and swept, no foreign bodies found. Right pupil is round and reactive. Left pupil is round and reactive.   Neck: Trachea normal and normal range of motion. Neck supple.       Right neck scar secondary to anterior cervical disc fusion   Cardiovascular: Normal rate, regular rhythm, S1 normal, S2 normal and normal heart sounds.   Pulmonary/Chest: Breath sounds normal.   Abdominal: Soft. Bowel sounds are normal. There is no rigidity and no guarding.   Musculoskeletal:        Lumbar back: She exhibits tenderness and spasm. She exhibits normal range of motion.        Back:    No obvious deformities noted except probably for mild curvature in the lumbar spine.    Mild-to-moderate paraspinal spasm is noted.    Patient is areflexic symmetrically.    Motor strength is normal. SLR is more positive on the right side as compared to the left side.    Peripheral pulses are palpable.   Neurological: She is alert. She displays no atrophy and no tremor. She exhibits normal muscle tone. She displays no seizure activity.   Reflex Scores:       Tricep reflexes are 0 on the right side and 0 on the left side.       Bicep reflexes are 0 on the right side and 0 on the left side.       Brachioradialis reflexes are 0 on the right side and 0 on the left " side.       Patellar reflexes are 0 on the right side and 0 on the left side.       Achilles reflexes are 0 on the right side and 0 on the left side.  Skin: Skin is warm and dry.   Psychiatric: She has a normal mood and affect. Her behavior is normal. Judgment and thought content normal.   Nursing note and vitals reviewed.       has been reviewed and pt will be discussed about it.      Assessment:       1. Chronic left-sided low back pain with left-sided sciatica    2. Muscle spasm of back           No visits with results within 3 Month(s) from this visit.   Latest known visit with results is:   No results found for any previous visit.         Plan:           Chronic left-sided low back pain with left-sided sciatica  -     methocarbamol (ROBAXIN) 500 MG Tab; Take 1 tablet (500 mg total) by mouth once daily.  Dispense: 30 tablet; Refill: 1  -     traMADol (ULTRAM) 50 mg tablet; Take 1 tablet (50 mg total) by mouth every 12 (twelve) hours as needed.  Dispense: 15 tablet; Refill: 0  -     X-Ray Lumbar Spine Complete 5 View; Future; Expected date: 08/28/2018  -     Ambulatory Referral to Physical/Occupational Therapy    Muscle spasm of back  -     cyclobenzaprine (FLEXERIL) 10 MG tablet; Take 1 tablet (10 mg total) by mouth every evening.  Dispense: 30 tablet; Refill: 1  -     Ambulatory Referral to Physical/Occupational Therapy      Plan is to start over the x-ray of lumbar spine because of persistent pain for more than 6 months.    She will probably benefit from physical therapy with or without chiropractor.    Back posture, exercises have been discussed.    Will discuss about sourcing controlled prescriptions from multiple sources.    Follow-up in 6 weeks or earlier as needed.      Patient seen with Ms Vicente as Chaperone      Current Outpatient Medications:     estradiol (ESTRACE) 1 MG tablet, Take 1 tablet (1 mg total) by mouth once daily., Disp: 90 tablet, Rfl: 3    meloxicam (MOBIC) 15 MG tablet, 1 tablet  daily as needed., Disp: , Rfl: 0    methocarbamol (ROBAXIN) 500 MG Tab, Take 1 tablet (500 mg total) by mouth once daily., Disp: 30 tablet, Rfl: 1    traMADol (ULTRAM) 50 mg tablet, Take 1 tablet (50 mg total) by mouth every 12 (twelve) hours as needed., Disp: 15 tablet, Rfl: 0    cyclobenzaprine (FLEXERIL) 10 MG tablet, Take 1 tablet (10 mg total) by mouth every evening., Disp: 30 tablet, Rfl: 1

## 2018-08-28 NOTE — PATIENT INSTRUCTIONS
Exercises to Strengthen Your Lower Back  Strong lower back and abdominal muscles work together to support your spine. The exercises below will help strengthen the lower back. It is important that you begin exercising slowly and increase levels gradually.  Always begin any exercise program with stretching. If you feel pain while doing any of these exercises, stop and talk to your doctor about a more specific exercise program that better suits your condition.   Low back stretch  The point of stretching is to make you more flexible and increase your range of motion. Stretch only as much as you are able. Stretch slowly. Do not push your stretch to the limit. If at any point you feel pain while stretching, this is your (temporary) limit.  · Lie on your back with your knees bent and both feet on the ground.  · Slowly raise your left knee to your chest as you flatten your lower back against the floor. Hold for 5 seconds.  · Relax and repeat the exercise with your right knee.  · Do 10 of these exercises for each leg.  · Repeat hugging both knees to your chest at the same time.  Building lower back strength  Start your exercise routine with 10 to 30 minutes a day, 1 to 3 times a day.  Initial exercises  Lying on your back:  1. Ankle pumps: Move your foot up and down, towards your head, and then away. Repeat 10 times with each foot.  2. Heel slides: Slowly bend your knee, drawing the heel of your foot towards you. Then slide your heel/foot from you, straightening your knee. Do not lift your foot off the floor (this is not a leg lift).  3. Abdominal contraction: Bend your knees and put your hands on your stomach. Tighten your stomach muscles. Hold for 5 seconds, then relax. Repeat 10 times.  4. Straight leg raise: Bend one leg at the knee and keep the other leg straight. Tighten your stomach muscles. Slowly lift your straight leg 6 to 12 inches off the floor and hold for up to 5 seconds. Repeat 10 times on each  side.  Standin. Wall squats: Stand with your back against the wall. Move your feet about 12 inches away from the wall. Tighten your stomach muscles, and slowly bend your knees until they are at about a 45 degree angle. Do not go down too far. Hold about 5 seconds. Then slowly return to your starting position. Repeat 10 times.  2. Heel raises: Stand facing the wall. Slowly raise the heels of your feet up and down, while keeping your toes on the floor. If you have trouble balancing, you can touch the wall with your hands. Repeat 10 times.  More advanced exercises  When you feel comfortable enough, try these exercises.  1. Kneeling lumbar extension: Begin on your hands and knees. At the same time, raise and straighten your right arm and left leg until they are parallel to the ground. Hold for 2 seconds and come back slowly to a starting position. Repeat with left arm and right leg, alternating 10 times.  2. Prone lumbar extension: Lie face down, arms extended overhead, palms on the floor. At the same time, raise your right arm and left leg as high as comfortably possible. Hold for 10 seconds and slowly return to start. Repeat with left arm and right leg, alternating 10 times. Gradually build up to 20 times. (Advanced: Repeat this exercise raising both arms and both legs a few inches off the floor at the same time. Hold for 5 seconds and release.)  3. Pelvic tilt: Lie on the floor on your back with your knees bent at 90 degrees. Your feet should be flat on the floor. Inhale, exhale, then slowly contract your abdominal muscles bringing your navel toward your spine. Let your pelvis rock back until your lower back is flat on the floor. Hold for 10 seconds while breathing smoothly.  4. Abdominal crunch: Perform a pelvic tilt (above) flattening your lower back against the floor. Holding the tension in your abdominal muscles, take another breath and raise your shoulder blades off the ground (this is not a full sit-up).  Keep your head in line with your body (dont bend your neck forward). Hold for 2 seconds, then slowly lower.  Date Last Reviewed: 6/1/2016 © 2000-2017 [employer]. [address] [place] [address], [place] [address]. All rights reserved. This information is not intended as a substitute for professional medical care. Always follow your healthcare professional's instructions.        Back Care Tips    Caring for your back  These are things you can do to prevent a recurrence of acute back pain and to reduce symptoms from chronic back pain:  · Maintain a healthy weight. If you are overweight, losing weight will help most types of back pain.  · Exercise is an important part of recovery from most types of back pain. The muscles behind and in front of the spine support the back. This means strengthening both the back muscles and the abdominal muscles will provide better support for your spine.   · Swimming and brisk walking are good overall exercises to improve your fitness level.  · Practice safe lifting methods (below).  · Practice good posture when sitting, standing and walking. Avoid prolonged sitting. This puts more stress on the lower back than standing or walking.  · Wear quality shoes with sufficient arch support. Foot and ankle alignment can affect back symptoms. Women should avoid wearing high heels.  · Therapeutic massage can help relax the back muscles without stretching them.  · During the first 24 to 72 hours after an acute injury or flare-up of chronic back pain, apply an ice pack to the painful area for 20 minutes and then remove it for 20 minutes, over a period of 60 to 90 minutes, or several times a day. As a safety precaution, do not use a heating pad at bedtime. Sleeping on a heating pad can lead to skin burns or tissue damage.  · You can alternate ice and heat therapies.  Medications  Talk to your healthcare provider before using medicines, especially if you have other medical problems or are  taking other medicines.  · You may use acetaminophen or ibuprofen to control pain, unless your healthcare provider prescribed other pain medicine. If you have chronic conditions like diabetes, liver or kidney disease, stomach ulcers, or gastrointestinal bleeding, or are taking blood thinners, talk with your healthcare provider before taking any medicines.  · Be careful if you are given prescription pain medicines, narcotics, or medicine for muscle spasm. They can cause drowsiness, affect your coordination, reflexes, and judgment. Do not drive or operate heavy machinery while taking these types of medicines. Take prescription pain medicine only as prescribed by your healthcare provider.  Lumbar stretch  Here is a simple stretching exercise that will help relax muscle spasm and keep your back more limber. If exercise makes your back pain worse, dont do it.  · Lie on your back with your knees bent and both feet on the ground.  · Slowly raise your left knee to your chest as you flatten your lower back against the floor. Hold for 5 seconds.  · Relax and repeat the exercise with your right knee.  · Do 10 of these exercises for each leg.  Safe lifting method  · Dont bend over at the waist to lift an object off the floor.  Instead, bend your knees and hips in a squat.   · Keep your back and head upright  · Hold the object close to your body, directly in front of you.  · Straighten your legs to lift the object.   · Lower the object to the floor in the reverse fashion.  · If you must slide something across the floor, push it.  Posture tips  Sitting  Sit in chairs with straight backs or low-back support. Keep your knees lower than your hips, with your feet flat on the floor.  When driving, sit up straight. Adjust the seat forward so you are not leaning toward the steering wheel.  A small pillow or rolled towel behind your lower back may help if you are driving long distances.   Standing  When standing for long periods, shift  most of your weight to one leg at a time. Alternate legs every few minutes.   Sleeping  The best way to sleep is on your side with your knees bent. Put a low pillow under your head to support your neck in a neutral spine position. Avoid thick pillows that bend your neck to one side. Put a pillow between your legs to further relax your lower back. If you sleep on your back, put pillows under your knees to support your legs in a slightly flexed position. Use a firm mattress. If your mattress sags, replace it, or use a 1/2-inch plywood board under the mattress to add support.  Follow-up care  Follow up with your healthcare provider, or as advised.  If X-rays, a CT scan or an MRI scan were taken, they will be reviewed by a radiologist. You will be notified of any new findings that may affect your care.  Call 911  Seek emergency medical care if any of the following occur:  · Trouble breathing  · Confusion  · Very drowsy  · Fainting or loss of consciousness  · Rapid or very slow heart rate  · Loss of  bowel or bladder control  When to seek medical care  Call your healthcare provider if any of the following occur:  · Pain becomes worse or spreads to your arms or legs  · Weakness or numbness in one or both arms or legs  · Numbness in the groin area  Date Last Reviewed: 6/1/2016  © 9382-5845 [employer]. [address] [place] [address]. All rights reserved. This information is not intended as a substitute for professional medical care. Always follow your healthcare professional's instructions.        Chronic Pain  Pain serves an important role. It lets you know something is wrong that needs your attention. When the body heals, pain normally goes away.  When pain lasts longer than six months, it is called chronic pain. This is pain that is present even after the body has healed. Chronic pain can cause mood problems and get in the way of your relationships and your daily life.  A number of conditions  can cause chronic pain. Some of the more common include:  · Previous surgery  · An old injury  · Infection  · Diseases such as diabetes  · Nerve damage  · Back injury  · Arthritis  · Migraine or other headaches  · Fibromyalgia  · Cancer  Depression and stress can make chronic pain symptoms worse. In some cases, a cause for the pain cannot be found.   Treatment  Treatment can greatly reduce pain. In many cases, pain can become less severe, occur less often, and interfere less with your daily life. Chronic pain is often treated with a combination of medicines, therapies, and lifestyle changes. You will work closely with your healthcare provider to find a treatment plan that works best for you.  · Ask your healthcare provider for a referral to a pain management specialty center. These can provide the most recent and proven pain management strategies, along with emotional support and comprehensive services.  · Several different types of medicines may be prescribed for chronic pain. Work with your healthcare provider to develop a medicine plan that helps manage your pain.  · Physical therapy can be very effective in helping reduce certain types of chronic pain.  · Occupational therapy teaches you how to do routine tasks of daily living in ways that lessen your discomfort.  · Psychological therapy can help you cope better with stress and pain.  · Other therapies such as meditation, yoga, biofeedback, massage, and acupuncture can also help manage chronic pain.  · Changing certain habits can help reduce chronic pain. They include:  ¨ Eating healthy  ¨ Developing an exercise routine  ¨ Getting enough sleep at night  ¨ Stopping smoking and limiting alcohol use  ¨ Losing excess weight  Follow-up care  Follow up with your healthcare provider as advised. Let your healthcare provider know if your current treatment plan is working or if changes are needed.  Resources  For more information, contact:  · American [place] for [employer]  Society www.achenet.org  · American Chronic Pain Association www.theacpa.org 030-922-2733  Date Last Reviewed: 7/26/2015  © 0435-2547 [employer]. [address] [place] [address], [place] [address]. All rights reserved. This information is not intended as a substitute for professional medical care. Always follow your healthcare professional's instructions.

## 2019-05-20 ENCOUNTER — OFFICE VISIT (OUTPATIENT)
Dept: ORTHOPEDICS | Facility: CLINIC | Age: 49
End: 2019-05-20
Payer: COMMERCIAL

## 2019-05-20 ENCOUNTER — TELEPHONE (OUTPATIENT)
Dept: ORTHOPEDICS | Facility: CLINIC | Age: 49
End: 2019-05-20

## 2019-05-20 VITALS
HEIGHT: 67 IN | DIASTOLIC BLOOD PRESSURE: 70 MMHG | BODY MASS INDEX: 24.33 KG/M2 | SYSTOLIC BLOOD PRESSURE: 110 MMHG | WEIGHT: 155 LBS | HEART RATE: 96 BPM

## 2019-05-20 DIAGNOSIS — Z98.1 HISTORY OF FUSION OF CERVICAL SPINE: ICD-10-CM

## 2019-05-20 DIAGNOSIS — M51.36 BULGING OF LUMBAR INTERVERTEBRAL DISC: ICD-10-CM

## 2019-05-20 DIAGNOSIS — M48.061 FORAMINAL STENOSIS OF LUMBAR REGION: Primary | ICD-10-CM

## 2019-05-20 DIAGNOSIS — M54.16 LUMBAR RADICULOPATHY: ICD-10-CM

## 2019-05-20 DIAGNOSIS — M51.36 DISC DEGENERATION, LUMBAR: ICD-10-CM

## 2019-05-20 PROCEDURE — 99203 PR OFFICE/OUTPT VISIT, NEW, LEVL III, 30-44 MIN: ICD-10-PCS | Mod: ,,, | Performed by: ORTHOPAEDIC SURGERY

## 2019-05-20 PROCEDURE — 99203 OFFICE O/P NEW LOW 30 MIN: CPT | Mod: ,,, | Performed by: ORTHOPAEDIC SURGERY

## 2019-05-20 PROCEDURE — 3008F BODY MASS INDEX DOCD: CPT | Mod: ,,, | Performed by: ORTHOPAEDIC SURGERY

## 2019-05-20 PROCEDURE — 3008F PR BODY MASS INDEX (BMI) DOCUMENTED: ICD-10-PCS | Mod: ,,, | Performed by: ORTHOPAEDIC SURGERY

## 2019-05-20 RX ORDER — CELECOXIB 100 MG/1
100 CAPSULE ORAL 2 TIMES DAILY
Refills: 3 | COMMUNITY
Start: 2019-05-08 | End: 2020-01-23

## 2019-05-20 RX ORDER — PHENTERMINE HYDROCHLORIDE 37.5 MG/1
TABLET ORAL
Refills: 0 | Status: ON HOLD | COMMUNITY
Start: 2019-05-18 | End: 2019-06-04

## 2019-05-20 NOTE — H&P (VIEW-ONLY)
Subjective:       Patient ID: Saima Winkler is a 48 y.o. female.    Chief Complaint: Pain of the Lumbar Spine (Lumbar pain x 1 year off and on. Pain radiates down both legs to feet with numbness, tingling and burning. Limited sitting. NO bowel or bladder problems. MRI done at [facility])      History of Present Illness  History of some occasional chronic back pain however over the past 4 months she started having pain radiating down both legs left greater right primary care doctor ordered an MRI her symptoms worse with activity    Current Medications  Current Outpatient Medications   Medication Sig Dispense Refill    celecoxib (CELEBREX) 100 MG capsule Take 100 mg by mouth 2 (two) times daily.  3    cyclobenzaprine (FLEXERIL) 10 MG tablet Take 1 tablet (10 mg total) by mouth every evening. 30 tablet 1    estradiol (ESTRACE) 1 MG tablet Take 1 tablet (1 mg total) by mouth once daily. 90 tablet 3    phentermine (ADIPEX-P) 37.5 mg tablet TK 1 T PO  QAM  0     No current facility-administered medications for this visit.        Allergies  Review of patient's allergies indicates:  No Known Allergies    Past Medical History  Past Medical History:   Diagnosis Date    Anxiety     Depression        Surgical History  Past Surgical History:   Procedure Laterality Date    HYSTERECTOMY      KNEE SURGERY      SHOULDER SURGERY      vaginal sling         Family History:   Family History   Problem Relation Age of Onset    Asthma Mother     Depression Mother     Hypertension Mother     Heart disease Father     Depression Father     Hypertension Father        Social History:   Social History     Socioeconomic History    Marital status:      Spouse name: Not on file    Number of children: 3    Years of education: Not on file    Highest education level: Not on file   Occupational History    Occupation: Registered Nurse      Employer: [employer]     Comment: Cancer Center   Social Needs     Financial resource strain: Not on file    Food insecurity:     Worry: Not on file     Inability: Not on file    Transportation needs:     Medical: Not on file     Non-medical: Not on file   Tobacco Use    Smoking status: Former Smoker     Last attempt to quit: 2007     Years since quittin.3    Smokeless tobacco: Never Used   Substance and Sexual Activity    Alcohol use: Yes    Drug use: No    Sexual activity: Yes     Partners: Male   Lifestyle    Physical activity:     Days per week: Not on file     Minutes per session: Not on file    Stress: Not on file   Relationships    Social connections:     Talks on phone: Not on file     Gets together: Not on file     Attends Shinto service: Not on file     Active member of club or organization: Not on file     Attends meetings of clubs or organizations: Not on file     Relationship status: Not on file   Other Topics Concern    Not on file   Social History Narrative    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       Hospitalization/Major Diagnostic Procedure:     Review of Systems     General/Constitutional:  Chills denies. Fatigue denies. Fever denies. Weight gain denies. Weight loss denies.    Respiratory:  Shortness of breath denies.    Cardiovascular:  Chest pain denies.    Gastrointestinal:  Constipation denies. Diarrhea denies. Nausea denies. Vomiting denies.     Hematology:  Easy bruising denies. Prolonged bleeding denies.     Genitourinary:  Frequent urination denies. Pain in lower back denies. Painful urination denies.     Musculoskeletal:  See HPI for details    Skin:  Rash denies.    Neurologic:  Dizziness denies. Gait abnormalities denies. Seizures denies. Tingling/Numbess denies.    Psychiatric:  Anxiety denies. Depressed mood denies.     Objective:   Vital Signs:   Vitals:    19 0830   BP: 110/70   Pulse: 96        Physical Exam      General Examination:     Constitutional: The patient is alert and oriented to  lace person and time. Mood is pleasant.     Head/Face: Normal facial features normal eyebrows    Eyes: Normal extraocular motion bilaterally    Lungs: Respirations are equal and unlabored    Gait is coordinated.    Cardiovascular: There are no swelling or varicosities present.    Lymphatic: Negative for adenopathy    Skin: Normal    Neurological: Level of consciousness normal. Oriented to place person and time and situation    Psychiatric: Oriented to time place person and situation    Lumbar exam moderate tenderness L4-S1 without spasm moderate range of motion limitations straight leg raising weakly positive left side reflexes hypoactive patella and Achilles pulses intact    XRAY Report/ Interpretation : Review of MRI study was recent down was done she has disc space narrowing at L4-5 with disc degeneration at facet hypertrophy and foraminal stenosis bilaterally left side greater than right at L4-5      Assessment:       1. Foraminal stenosis of lumbar region    2. History of fusion of cervical spine    3. Lumbar radiculopathy    4. Disc degeneration, lumbar    5. Bulging of lumbar intervertebral disc        Plan:       Saima was seen today for pain.    Diagnoses and all orders for this visit:    Foraminal stenosis of lumbar region    History of fusion of cervical spine    Lumbar radiculopathy    Disc degeneration, lumbar    Bulging of lumbar intervertebral disc  Comments:  L5-S1         Follow up in about 6 weeks (around 7/1/2019) for Lumbar ROSE  follow up.    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

## 2019-05-20 NOTE — PATIENT INSTRUCTIONS
[facility]  [facility]      EPIDURAL STEROID INJECTION    What is the epidural space?    The membrane that covers the spinal cord and nerve roots in your spine is called the dura membrane. The space surrounding the dura is the epidural space. Nerves travel through the epidural space to your neck, back, legs and arms. Inflammation of these nerve roots may cause pain in these regions due to irritation from a damaged disc or from contact in some way with the bony structure of the spine.     What is an epidural steroid injection and why is it helpful?    An epidural injection places anti-inflammatory medicine into the epidural space to decrease inflammation of the nerve roots, hopefully reducing your pain. The epidural injection may help the injury to heal by reducing inflammation. It may provide permanent relief or a period of pain relief for several months while the injury or cause of your pain is healing.    What will happen to me during the procedure?    An IV will be started so that sedation can be given. You will be positioned in such a way that your doctor can best visualize the area to be injected. The skin on your back or neck will be scrubbed using sterile scrub (soap). Next, the physician will numb a small area of skin where the epidural needle will be placed. This medicine stings for several seconds. After the numbing medicine is effective, your doctor will direct a small epidural needle using x-ray guidance into the epidural space. A small of amount of contrast (dye) is then injected to insure proper needle position in the epidural space. Then a mixture of numbing medicine (anesthetic) and anti-inflammatory (cortisone/steroid) will be injected.     What will happen after the procedure?    You will go back to the recovery area where you will be monitored for 30-60 minutes. You may experience some numbness and/or weakness into your legs and arms for a few hours. The steroids  will begin working 3-5 days after the procedure and may continue to improve your pain for up to fourteen days.     You should have a follow up visit 2 weeks after the procedure to determine if further treatment is needed. These injections are often performed in a series of three (3) to provide the best possible results.     General Pre/Post Procedure Instructions:    You should not eat or drink anything after 12 oclock the night before the procedure. If you are diabetic, do not take your insulin or oral medication the morning of the procedure because you have had nothing to eat. If you are taking routine heart or blood pressure medicine, you should take it with a sip of water the morning of the procedure. You should not take medications that give pain relief or lessen your usual pain. These medicines can be restarted after the procedure if they are needed.     If you are on Coumadin, Heparin, Plavix or other blood thinners (including aspirin and all medications that contain aspirin), you must notify the office well in advance so the timing of these medications can be coordinated with your primary care physician.     You will be at the hospital/surgery center for a few hours for your procedure. A  must accompany you and be responsible for driving you home. No driving is allowed the day of the procedure. You may return to your normal activities the day after the procedure, including returning to work.     If you are unable to keep this appointment, please give notice as soon as possible and at least 24 hours in advance during regular office hours.    Thank you.

## 2019-05-20 NOTE — PROGRESS NOTES
Subjective:       Patient ID: Saima Winkler is a 48 y.o. female.    Chief Complaint: Pain of the Lumbar Spine (Lumbar pain x 1 year off and on. Pain radiates down both legs to feet with numbness, tingling and burning. Limited sitting. NO bowel or bladder problems. MRI done at [facility])      History of Present Illness  History of some occasional chronic back pain however over the past 4 months she started having pain radiating down both legs left greater right primary care doctor ordered an MRI her symptoms worse with activity    Current Medications  Current Outpatient Medications   Medication Sig Dispense Refill    celecoxib (CELEBREX) 100 MG capsule Take 100 mg by mouth 2 (two) times daily.  3    cyclobenzaprine (FLEXERIL) 10 MG tablet Take 1 tablet (10 mg total) by mouth every evening. 30 tablet 1    estradiol (ESTRACE) 1 MG tablet Take 1 tablet (1 mg total) by mouth once daily. 90 tablet 3    phentermine (ADIPEX-P) 37.5 mg tablet TK 1 T PO  QAM  0     No current facility-administered medications for this visit.        Allergies  Review of patient's allergies indicates:  No Known Allergies    Past Medical History  Past Medical History:   Diagnosis Date    Anxiety     Depression        Surgical History  Past Surgical History:   Procedure Laterality Date    HYSTERECTOMY      KNEE SURGERY      SHOULDER SURGERY      vaginal sling         Family History:   Family History   Problem Relation Age of Onset    Asthma Mother     Depression Mother     Hypertension Mother     Heart disease Father     Depression Father     Hypertension Father        Social History:   Social History     Socioeconomic History    Marital status:      Spouse name: Not on file    Number of children: 3    Years of education: Not on file    Highest education level: Not on file   Occupational History    Occupation: Registered Nurse      Employer: [employer]     Comment: Cancer Center   Social Needs     Financial resource strain: Not on file    Food insecurity:     Worry: Not on file     Inability: Not on file    Transportation needs:     Medical: Not on file     Non-medical: Not on file   Tobacco Use    Smoking status: Former Smoker     Last attempt to quit: 2007     Years since quittin.3    Smokeless tobacco: Never Used   Substance and Sexual Activity    Alcohol use: Yes    Drug use: No    Sexual activity: Yes     Partners: Male   Lifestyle    Physical activity:     Days per week: Not on file     Minutes per session: Not on file    Stress: Not on file   Relationships    Social connections:     Talks on phone: Not on file     Gets together: Not on file     Attends Nondenominational service: Not on file     Active member of club or organization: Not on file     Attends meetings of clubs or organizations: Not on file     Relationship status: Not on file   Other Topics Concern    Not on file   Social History Narrative    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       Hospitalization/Major Diagnostic Procedure:     Review of Systems     General/Constitutional:  Chills denies. Fatigue denies. Fever denies. Weight gain denies. Weight loss denies.    Respiratory:  Shortness of breath denies.    Cardiovascular:  Chest pain denies.    Gastrointestinal:  Constipation denies. Diarrhea denies. Nausea denies. Vomiting denies.     Hematology:  Easy bruising denies. Prolonged bleeding denies.     Genitourinary:  Frequent urination denies. Pain in lower back denies. Painful urination denies.     Musculoskeletal:  See HPI for details    Skin:  Rash denies.    Neurologic:  Dizziness denies. Gait abnormalities denies. Seizures denies. Tingling/Numbess denies.    Psychiatric:  Anxiety denies. Depressed mood denies.     Objective:   Vital Signs:   Vitals:    19 0830   BP: 110/70   Pulse: 96        Physical Exam      General Examination:     Constitutional: The patient is alert and oriented to  lace person and time. Mood is pleasant.     Head/Face: Normal facial features normal eyebrows    Eyes: Normal extraocular motion bilaterally    Lungs: Respirations are equal and unlabored    Gait is coordinated.    Cardiovascular: There are no swelling or varicosities present.    Lymphatic: Negative for adenopathy    Skin: Normal    Neurological: Level of consciousness normal. Oriented to place person and time and situation    Psychiatric: Oriented to time place person and situation    Lumbar exam moderate tenderness L4-S1 without spasm moderate range of motion limitations straight leg raising weakly positive left side reflexes hypoactive patella and Achilles pulses intact    XRAY Report/ Interpretation : Review of MRI study was recent down was done she has disc space narrowing at L4-5 with disc degeneration at facet hypertrophy and foraminal stenosis bilaterally left side greater than right at L4-5      Assessment:       1. Foraminal stenosis of lumbar region    2. History of fusion of cervical spine    3. Lumbar radiculopathy    4. Disc degeneration, lumbar    5. Bulging of lumbar intervertebral disc        Plan:       Saima was seen today for pain.    Diagnoses and all orders for this visit:    Foraminal stenosis of lumbar region    History of fusion of cervical spine    Lumbar radiculopathy    Disc degeneration, lumbar    Bulging of lumbar intervertebral disc  Comments:  L5-S1         Follow up in about 6 weeks (around 7/1/2019) for Lumbar ROSE  follow up.    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

## 2019-05-21 DIAGNOSIS — M54.16 LUMBAR RADICULOPATHY: Primary | ICD-10-CM

## 2019-05-21 DIAGNOSIS — M51.36 DDD (DEGENERATIVE DISC DISEASE), LUMBAR: ICD-10-CM

## 2019-05-23 RX ORDER — HYDROCODONE BITARTRATE AND ACETAMINOPHEN 5; 325 MG/1; MG/1
1 TABLET ORAL EVERY 6 HOURS PRN
Qty: 28 TABLET | Refills: 0 | Status: SHIPPED | OUTPATIENT
Start: 2019-05-23 | End: 2019-05-30

## 2019-06-04 ENCOUNTER — HOSPITAL ENCOUNTER (OUTPATIENT)
Facility: AMBULARY SURGERY CENTER | Age: 49
Discharge: HOME | End: 2019-06-04
Attending: ANESTHESIOLOGY | Admitting: ANESTHESIOLOGY
Payer: COMMERCIAL

## 2019-06-04 DIAGNOSIS — M54.16 LUMBAR RADICULITIS: Primary | ICD-10-CM

## 2019-06-04 PROCEDURE — 99152 PR MOD CONSCIOUS SEDATION, SAME PHYS, 5+ YRS, FIRST 15 MIN: ICD-10-PCS | Mod: ,,, | Performed by: ANESTHESIOLOGY

## 2019-06-04 PROCEDURE — 99152 MOD SED SAME PHYS/QHP 5/>YRS: CPT | Mod: ,,, | Performed by: ANESTHESIOLOGY

## 2019-06-04 PROCEDURE — 64483 NJX AA&/STRD TFRM EPI L/S 1: CPT | Mod: RT | Performed by: ANESTHESIOLOGY

## 2019-06-04 PROCEDURE — 64483 PR EPIDURAL INJ, ANES/STEROID, TRANSFORAMINAL, LUMB/SACR, SNGL LEVL: ICD-10-PCS | Mod: 50,,, | Performed by: ANESTHESIOLOGY

## 2019-06-04 PROCEDURE — 64483 NJX AA&/STRD TFRM EPI L/S 1: CPT | Mod: 50,,, | Performed by: ANESTHESIOLOGY

## 2019-06-04 RX ORDER — BUPIVACAINE HYDROCHLORIDE 2.5 MG/ML
INJECTION, SOLUTION EPIDURAL; INFILTRATION; INTRACAUDAL
Status: DISCONTINUED | OUTPATIENT
Start: 2019-06-04 | End: 2019-06-04 | Stop reason: HOSPADM

## 2019-06-04 RX ORDER — MIDAZOLAM HYDROCHLORIDE 1 MG/ML
INJECTION INTRAMUSCULAR; INTRAVENOUS
Status: DISCONTINUED | OUTPATIENT
Start: 2019-06-04 | End: 2019-06-04 | Stop reason: HOSPADM

## 2019-06-04 RX ORDER — LIDOCAINE HYDROCHLORIDE 10 MG/ML
INJECTION, SOLUTION EPIDURAL; INFILTRATION; INTRACAUDAL; PERINEURAL
Status: DISCONTINUED | OUTPATIENT
Start: 2019-06-04 | End: 2019-06-04 | Stop reason: HOSPADM

## 2019-06-04 RX ORDER — DEXAMETHASONE SODIUM PHOSPHATE 10 MG/ML
INJECTION INTRAMUSCULAR; INTRAVENOUS
Status: DISCONTINUED | OUTPATIENT
Start: 2019-06-04 | End: 2019-06-04 | Stop reason: HOSPADM

## 2019-06-04 RX ORDER — FENTANYL CITRATE 50 UG/ML
INJECTION, SOLUTION INTRAMUSCULAR; INTRAVENOUS
Status: DISCONTINUED | OUTPATIENT
Start: 2019-06-04 | End: 2019-06-04 | Stop reason: HOSPADM

## 2019-06-04 RX ORDER — HYDROCODONE BITARTRATE AND ACETAMINOPHEN 5; 325 MG/1; MG/1
1 TABLET ORAL EVERY 6 HOURS PRN
COMMUNITY
End: 2019-08-19

## 2019-06-04 RX ORDER — DEXAMETHASONE SODIUM PHOSPHATE 10 MG/ML
INJECTION INTRAMUSCULAR; INTRAVENOUS
Status: DISCONTINUED
Start: 2019-06-04 | End: 2019-06-04 | Stop reason: HOSPADM

## 2019-06-04 RX ORDER — SODIUM CHLORIDE, SODIUM LACTATE, POTASSIUM CHLORIDE, CALCIUM CHLORIDE 600; 310; 30; 20 MG/100ML; MG/100ML; MG/100ML; MG/100ML
INJECTION, SOLUTION INTRAVENOUS ONCE AS NEEDED
Status: COMPLETED | OUTPATIENT
Start: 2019-06-04 | End: 2019-06-04

## 2019-06-04 RX ADMIN — SODIUM CHLORIDE, SODIUM LACTATE, POTASSIUM CHLORIDE, CALCIUM CHLORIDE: 600; 310; 30; 20 INJECTION, SOLUTION INTRAVENOUS at 01:06

## 2019-06-04 NOTE — PLAN OF CARE
Vss, parul po fluids, denies pain, ambulates easily. IV removed, catheter intact. Discharge instructions provided and states understanding. States ready to go home.  Discharged from facility with family.

## 2019-06-04 NOTE — DISCHARGE SUMMARY
[facility]  Discharge Note  Short Stay    Admit Date: 6/4/2019    Discharge Date and Time: 6/4/2019    Attending Physician: Jeyson Kent MD     Discharge Provider: Jeyson Kent    Diagnoses:  Active Hospital Problems    Diagnosis  POA    *Lumbar radiculitis [M54.16]  Yes      Resolved Hospital Problems   No resolved problems to display.       Hospital Course: Lumbar ROSE  Discharged Condition: Good    Final Diagnoses:   Active Hospital Problems    Diagnosis  POA    *Lumbar radiculitis [M54.16]  Yes      Resolved Hospital Problems   No resolved problems to display.       Disposition: Home or Self Care    Follow up/Patient Instructions:    Medications:  Reconciled Home Medications:      Medication List      CONTINUE taking these medications    celecoxib 100 MG capsule  Commonly known as:  CeleBREX  Take 100 mg by mouth 2 (two) times daily.     cyclobenzaprine 10 MG tablet  Commonly known as:  FLEXERIL  Take 1 tablet (10 mg total) by mouth every evening.     estradiol 1 MG tablet  Commonly known as:  ESTRACE  Take 1 tablet (1 mg total) by mouth once daily.     HYDROcodone-acetaminophen 5-325 mg per tablet  Commonly known as:  NORCO  Take 1 tablet by mouth every 6 (six) hours as needed for Pain.          Discharge Procedure Orders   Call MD for:  temperature >100.4     Call MD for:  persistent nausea and vomiting or diarrhea     Call MD for:  severe uncontrolled pain     Call MD for:  redness, tenderness, or signs of infection (pain, swelling, redness, odor or green/yellow discharge around incision site)     Call MD for:  difficulty breathing or increased cough     Call MD for:  severe persistent headache        Follow up with MD in 2-3 weeks    Discharge Procedure Orders (must include Diet, Follow-up, Activity):   Discharge Procedure Orders (must include Diet, Follow-up, Activity)   Call MD for:  temperature >100.4     Call MD for:  persistent nausea and vomiting or diarrhea     Call MD for:  severe  uncontrolled pain     Call MD for:  redness, tenderness, or signs of infection (pain, swelling, redness, odor or green/yellow discharge around incision site)     Call MD for:  difficulty breathing or increased cough     Call MD for:  severe persistent headache

## 2019-06-04 NOTE — OP NOTE
PROCEDURE DATE: 6/4/2019    PROCEDURE: Bilateral L4-5 transforaminal epidural steroid injection under fluoroscopy    DIAGNOSIS: Lumbar disc displacement without myelopathy  Post op diagnosis: Same    PHYSICIAN: Jeyson Kent MD    MEDICATIONS INJECTED:  Dexamethasone 5mg (0.5ml) and 1.5ml 0.25% bupivicaine at each nerve root.     LOCAL ANESTHETIC INJECTED:  Lidocaine 1%. 2 ml per site.    SEDATION MEDICATIONS: RN IV sedation    ESTIMATED BLOOD LOSS:  None    COMPLICATIONS:  None    TECHNIQUE:   A time-out was taken to identify patient and procedure side prior to starting the procedure. The patient was placed in a prone position, prepped and draped in the usual sterile fashion using ChloraPrep and sterile towels.  The area to be injected was determined under fluoroscopic guidance in AP and oblique view.  Local anesthetic was given by raising a wheal and going down to the hub of a 25-gauge 1.5 inch needle.  In oblique view, a 3.5 inch 22-gauge bent-tip spinal needle was introduced towards 6 oclock position of the pedicle of each above named nerve root level.  The needle was walked medially then hinged into the neural foramen and position was confirmed in AP and lateral views.  1ml contrast dye was injected to confirm appropriate placement and that there was no vascular uptake.  After negative aspiration for blood or CSF, the medication was then injected. This was performed at the bilateral L4-5 level(s). The patient tolerated the procedure well.    The patient was monitored after the procedure.  Patient was given post procedure and discharge instructions to follow at home. The patient was discharged in a stable condition.

## 2019-06-06 VITALS
BODY MASS INDEX: 24.33 KG/M2 | HEIGHT: 67 IN | SYSTOLIC BLOOD PRESSURE: 124 MMHG | OXYGEN SATURATION: 96 % | TEMPERATURE: 99 F | DIASTOLIC BLOOD PRESSURE: 83 MMHG | WEIGHT: 155 LBS | RESPIRATION RATE: 14 BRPM | HEART RATE: 92 BPM

## 2019-06-19 DIAGNOSIS — N95.1 HOT FLASHES DUE TO MENOPAUSE: ICD-10-CM

## 2019-06-19 RX ORDER — ESTRADIOL 1 MG/1
TABLET ORAL
Qty: 30 TABLET | Refills: 1 | Status: SHIPPED | OUTPATIENT
Start: 2019-06-19 | End: 2019-08-19 | Stop reason: SDUPTHER

## 2019-07-08 ENCOUNTER — OFFICE VISIT (OUTPATIENT)
Dept: ORTHOPEDICS | Facility: CLINIC | Age: 49
End: 2019-07-08
Payer: COMMERCIAL

## 2019-07-08 ENCOUNTER — TELEPHONE (OUTPATIENT)
Dept: ORTHOPEDICS | Facility: CLINIC | Age: 49
End: 2019-07-08

## 2019-07-08 ENCOUNTER — TELEPHONE (OUTPATIENT)
Dept: PAIN MEDICINE | Facility: CLINIC | Age: 49
End: 2019-07-08

## 2019-07-08 VITALS
HEIGHT: 67 IN | HEART RATE: 88 BPM | BODY MASS INDEX: 25.27 KG/M2 | SYSTOLIC BLOOD PRESSURE: 122 MMHG | WEIGHT: 161 LBS | DIASTOLIC BLOOD PRESSURE: 78 MMHG

## 2019-07-08 DIAGNOSIS — M48.061 FORAMINAL STENOSIS OF LUMBAR REGION: ICD-10-CM

## 2019-07-08 DIAGNOSIS — M51.36 DISC DEGENERATION, LUMBAR: ICD-10-CM

## 2019-07-08 DIAGNOSIS — M54.16 LUMBAR RADICULOPATHY: Primary | ICD-10-CM

## 2019-07-08 DIAGNOSIS — M51.36 BULGING OF LUMBAR INTERVERTEBRAL DISC: ICD-10-CM

## 2019-07-08 PROCEDURE — 3008F BODY MASS INDEX DOCD: CPT | Mod: ,,, | Performed by: ORTHOPAEDIC SURGERY

## 2019-07-08 PROCEDURE — 3008F PR BODY MASS INDEX (BMI) DOCUMENTED: ICD-10-PCS | Mod: ,,, | Performed by: ORTHOPAEDIC SURGERY

## 2019-07-08 PROCEDURE — 99213 OFFICE O/P EST LOW 20 MIN: CPT | Mod: ,,, | Performed by: ORTHOPAEDIC SURGERY

## 2019-07-08 PROCEDURE — 99213 PR OFFICE/OUTPT VISIT, EST, LEVL III, 20-29 MIN: ICD-10-PCS | Mod: ,,, | Performed by: ORTHOPAEDIC SURGERY

## 2019-07-08 RX ORDER — ORPHENADRINE CITRATE 100 MG/1
100 TABLET, EXTENDED RELEASE ORAL 2 TIMES DAILY PRN
Qty: 20 TABLET | Refills: 2 | Status: SHIPPED | OUTPATIENT
Start: 2019-07-08 | End: 2019-08-07

## 2019-07-08 RX ORDER — GABAPENTIN 300 MG/1
600 CAPSULE ORAL NIGHTLY
Qty: 60 CAPSULE | Refills: 2 | Status: SHIPPED | OUTPATIENT
Start: 2019-07-08 | End: 2019-08-19 | Stop reason: SDUPTHER

## 2019-07-08 NOTE — H&P (VIEW-ONLY)
Subjective:       Patient ID: Saima Winkler is a 49 y.o. female.    Chief Complaint: Pain of the Lumbar Spine (Lumbar ROSE given by Dr Kent lasted about 2 weeks. Lumbar pain now goes down right leg to foot instead of left.)      History of Present Illness  Patient is here to follow-up for low back pain with bilateral lower extremity radiculitis dysesthesia. She was last seen about 6 weeks ago and referred for a interlaminar epidural steroid injection. This did relieve her symptoms for a couple weeks but the pain has returned; prior to the procedure the majority of her pain was left lower extremity but currently the majority of her pain is right lower extremity.    Current Medications  Current Outpatient Medications   Medication Sig Dispense Refill    celecoxib (CELEBREX) 100 MG capsule Take 100 mg by mouth 2 (two) times daily.  3    cyclobenzaprine (FLEXERIL) 10 MG tablet Take 1 tablet (10 mg total) by mouth every evening. 30 tablet 1    estradiol (ESTRACE) 1 MG tablet TAKE 1 TABLET BY MOUTH EVERY DAY 30 tablet 1    HYDROcodone-acetaminophen (NORCO) 5-325 mg per tablet Take 1 tablet by mouth every 6 (six) hours as needed for Pain.       No current facility-administered medications for this visit.        Allergies  Review of patient's allergies indicates:  No Known Allergies    Past Medical History  Past Medical History:   Diagnosis Date    Anxiety     Depression        Surgical History  Past Surgical History:   Procedure Laterality Date    HYSTERECTOMY      Injection,steroid,epidural,transforaminal approach L4-5 Bilateral 6/4/2019    [facility]    KNEE SURGERY      SHOULDER SURGERY      vaginal sling         Family History:   Family History   Problem Relation Age of Onset    Asthma Mother     Depression Mother     Hypertension Mother     Heart disease Father     Depression Father     Hypertension Father        Social History:   Social History     Socioeconomic History     Marital status:      Spouse name: Not on file    Number of children: 3    Years of education: Not on file    Highest education level: Not on file   Occupational History    Occupation: Registered Nurse      Employer: [employer]     Comment: Cancer Center   Social Needs    Financial resource strain: Not on file    Food insecurity:     Worry: Not on file     Inability: Not on file    Transportation needs:     Medical: Not on file     Non-medical: Not on file   Tobacco Use    Smoking status: Former Smoker     Last attempt to quit: 2007     Years since quittin.5    Smokeless tobacco: Never Used   Substance and Sexual Activity    Alcohol use: Yes    Drug use: No    Sexual activity: Yes     Partners: Male   Lifestyle    Physical activity:     Days per week: Not on file     Minutes per session: Not on file    Stress: Not on file   Relationships    Social connections:     Talks on phone: Not on file     Gets together: Not on file     Attends Buddhist service: Not on file     Active member of club or organization: Not on file     Attends meetings of clubs or organizations: Not on file     Relationship status: Not on file   Other Topics Concern    Not on file   Social History Narrative    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       Hospitalization/Major Diagnostic Procedure:     Review of Systems     General/Constitutional:  Chills denies. Fatigue denies. Fever denies. Weight gain denies. Weight loss denies.    Respiratory:  Shortness of breath denies.    Cardiovascular:  Chest pain denies.    Gastrointestinal:  Constipation denies. Diarrhea denies. Nausea denies. Vomiting denies.     Hematology:  Easy bruising denies. Prolonged bleeding denies.     Genitourinary:  Frequent urination denies. Pain in lower back denies. Painful urination denies.     Musculoskeletal:  See HPI for details    Skin:  Rash denies.    Neurologic:  Dizziness denies. Gait  "abnormalities denies. Seizures denies. Tingling/Numbess denies.    Psychiatric:  Anxiety denies. Depressed mood denies.     Objective:   Vital Signs:   Vitals:    07/08/19 0834   BP: 122/78   Pulse: 88        Physical Exam      General Examination:     Constitutional: The patient is alert and oriented to lace person and time. Mood is pleasant.     Head/Face: Normal facial features normal eyebrows    Eyes: Normal extraocular motion bilaterally    Lungs: Respirations are equal and unlabored    Gait is coordinated.    Cardiovascular: There are no swelling or varicosities present.    Lymphatic: Negative for adenopathy    Skin: Normal    Neurological: Level of consciousness normal. Oriented to place person and time and situation    Psychiatric: Oriented to time place person and situation    Lumbar exam moderate tenderness L4-S1 without spasm moderate range of motion limitations straight leg raising weakly positive left side reflexes hypoactive patella and Achilles pulses intact.    XRAY Report/ Interpretation: No new studies today      Assessment:       1. Lumbar radiculopathy    2. Disc degeneration, lumbar    3. Bulging of lumbar intervertebral disc    4. Foraminal stenosis of lumbar region        Plan:       Saima was seen today for pain.    Diagnoses and all orders for this visit:    Lumbar radiculopathy    Disc degeneration, lumbar    Bulging of lumbar intervertebral disc    Foraminal stenosis of lumbar region         No follow-ups on file.  Donnie Diego, physician's assistant served in the capacity as a "scribe" for this patient encounter  A "face to face" encounter occurred with Dr. Estes on this date  The treatment plan and medical decision making is outlined below:  At this time recommend bilateral L4 and L5 nerve root transforaminal ROSE. Follow-up in 3 weeks. If the lower extremity radicular symptoms remain then the only option we may have would be surgery. However if the lower extremity symptoms are " improved but she persists with low back pain that I would recommend bilateral L4-5 medial branch blocks with progression toward a rhizotomy. Trial of Neurontin 300-600 mg daily at bedtime and Norflex twice a day as needed for pain and muscle spasm.    This note was created using Dragon voice recognition software that occasionally misinterpreted phrases or words.

## 2019-07-08 NOTE — TELEPHONE ENCOUNTER
----- Message from Jonnathan Jimenez sent at 7/8/2019  2:27 PM CDT -----  Contact: same  Patient called in and stated she missed a call from office about scheduling an injection appt.    Patient call back number is [phone]

## 2019-07-08 NOTE — PROGRESS NOTES
Subjective:       Patient ID: Saima Winkler is a 49 y.o. female.    Chief Complaint: Pain of the Lumbar Spine (Lumbar ROSE given by Dr Kent lasted about 2 weeks. Lumbar pain now goes down right leg to foot instead of left.)      History of Present Illness  Patient is here to follow-up for low back pain with bilateral lower extremity radiculitis dysesthesia. She was last seen about 6 weeks ago and referred for a interlaminar epidural steroid injection. This did relieve her symptoms for a couple weeks but the pain has returned; prior to the procedure the majority of her pain was left lower extremity but currently the majority of her pain is right lower extremity.    Current Medications  Current Outpatient Medications   Medication Sig Dispense Refill    celecoxib (CELEBREX) 100 MG capsule Take 100 mg by mouth 2 (two) times daily.  3    cyclobenzaprine (FLEXERIL) 10 MG tablet Take 1 tablet (10 mg total) by mouth every evening. 30 tablet 1    estradiol (ESTRACE) 1 MG tablet TAKE 1 TABLET BY MOUTH EVERY DAY 30 tablet 1    HYDROcodone-acetaminophen (NORCO) 5-325 mg per tablet Take 1 tablet by mouth every 6 (six) hours as needed for Pain.       No current facility-administered medications for this visit.        Allergies  Review of patient's allergies indicates:  No Known Allergies    Past Medical History  Past Medical History:   Diagnosis Date    Anxiety     Depression        Surgical History  Past Surgical History:   Procedure Laterality Date    HYSTERECTOMY      Injection,steroid,epidural,transforaminal approach L4-5 Bilateral 6/4/2019    [facility]    KNEE SURGERY      SHOULDER SURGERY      vaginal sling         Family History:   Family History   Problem Relation Age of Onset    Asthma Mother     Depression Mother     Hypertension Mother     Heart disease Father     Depression Father     Hypertension Father        Social History:   Social History     Socioeconomic History     Marital status:      Spouse name: Not on file    Number of children: 3    Years of education: Not on file    Highest education level: Not on file   Occupational History    Occupation: Registered Nurse      Employer: [employer]     Comment: Cancer Center   Social Needs    Financial resource strain: Not on file    Food insecurity:     Worry: Not on file     Inability: Not on file    Transportation needs:     Medical: Not on file     Non-medical: Not on file   Tobacco Use    Smoking status: Former Smoker     Last attempt to quit: 2007     Years since quittin.5    Smokeless tobacco: Never Used   Substance and Sexual Activity    Alcohol use: Yes    Drug use: No    Sexual activity: Yes     Partners: Male   Lifestyle    Physical activity:     Days per week: Not on file     Minutes per session: Not on file    Stress: Not on file   Relationships    Social connections:     Talks on phone: Not on file     Gets together: Not on file     Attends Orthodox service: Not on file     Active member of club or organization: Not on file     Attends meetings of clubs or organizations: Not on file     Relationship status: Not on file   Other Topics Concern    Not on file   Social History Narrative    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       Hospitalization/Major Diagnostic Procedure:     Review of Systems     General/Constitutional:  Chills denies. Fatigue denies. Fever denies. Weight gain denies. Weight loss denies.    Respiratory:  Shortness of breath denies.    Cardiovascular:  Chest pain denies.    Gastrointestinal:  Constipation denies. Diarrhea denies. Nausea denies. Vomiting denies.     Hematology:  Easy bruising denies. Prolonged bleeding denies.     Genitourinary:  Frequent urination denies. Pain in lower back denies. Painful urination denies.     Musculoskeletal:  See HPI for details    Skin:  Rash denies.    Neurologic:  Dizziness denies. Gait  "abnormalities denies. Seizures denies. Tingling/Numbess denies.    Psychiatric:  Anxiety denies. Depressed mood denies.     Objective:   Vital Signs:   Vitals:    07/08/19 0834   BP: 122/78   Pulse: 88        Physical Exam      General Examination:     Constitutional: The patient is alert and oriented to lace person and time. Mood is pleasant.     Head/Face: Normal facial features normal eyebrows    Eyes: Normal extraocular motion bilaterally    Lungs: Respirations are equal and unlabored    Gait is coordinated.    Cardiovascular: There are no swelling or varicosities present.    Lymphatic: Negative for adenopathy    Skin: Normal    Neurological: Level of consciousness normal. Oriented to place person and time and situation    Psychiatric: Oriented to time place person and situation    Lumbar exam moderate tenderness L4-S1 without spasm moderate range of motion limitations straight leg raising weakly positive left side reflexes hypoactive patella and Achilles pulses intact.    XRAY Report/ Interpretation: No new studies today      Assessment:       1. Lumbar radiculopathy    2. Disc degeneration, lumbar    3. Bulging of lumbar intervertebral disc    4. Foraminal stenosis of lumbar region        Plan:       Saima was seen today for pain.    Diagnoses and all orders for this visit:    Lumbar radiculopathy    Disc degeneration, lumbar    Bulging of lumbar intervertebral disc    Foraminal stenosis of lumbar region         No follow-ups on file.  Donnie Diego, physician's assistant served in the capacity as a "scribe" for this patient encounter  A "face to face" encounter occurred with Dr. Estes on this date  The treatment plan and medical decision making is outlined below:  At this time recommend bilateral L4 and L5 nerve root transforaminal ROSE. Follow-up in 3 weeks. If the lower extremity radicular symptoms remain then the only option we may have would be surgery. However if the lower extremity symptoms are " improved but she persists with low back pain that I would recommend bilateral L4-5 medial branch blocks with progression toward a rhizotomy. Trial of Neurontin 300-600 mg daily at bedtime and Norflex twice a day as needed for pain and muscle spasm.    This note was created using Dragon voice recognition software that occasionally misinterpreted phrases or words.

## 2019-07-10 DIAGNOSIS — M54.16 LUMBAR RADICULOPATHY: Primary | ICD-10-CM

## 2019-07-25 ENCOUNTER — HOSPITAL ENCOUNTER (OUTPATIENT)
Facility: AMBULARY SURGERY CENTER | Age: 49
Discharge: HOME | End: 2019-07-25
Attending: ANESTHESIOLOGY | Admitting: ANESTHESIOLOGY
Payer: COMMERCIAL

## 2019-07-25 DIAGNOSIS — M54.16 LUMBAR RADICULITIS: Primary | ICD-10-CM

## 2019-07-25 PROCEDURE — 64483 NJX AA&/STRD TFRM EPI L/S 1: CPT | Mod: LT | Performed by: ANESTHESIOLOGY

## 2019-07-25 PROCEDURE — 99152 PR MOD CONSCIOUS SEDATION, SAME PHYS, 5+ YRS, FIRST 15 MIN: ICD-10-PCS | Mod: ,,, | Performed by: ANESTHESIOLOGY

## 2019-07-25 PROCEDURE — 99152 MOD SED SAME PHYS/QHP 5/>YRS: CPT | Mod: ,,, | Performed by: ANESTHESIOLOGY

## 2019-07-25 PROCEDURE — 64483 PR EPIDURAL INJ, ANES/STEROID, TRANSFORAMINAL, LUMB/SACR, SNGL LEVL: ICD-10-PCS | Mod: 50,,, | Performed by: ANESTHESIOLOGY

## 2019-07-25 PROCEDURE — 64483 NJX AA&/STRD TFRM EPI L/S 1: CPT | Mod: 50,,, | Performed by: ANESTHESIOLOGY

## 2019-07-25 RX ORDER — FENTANYL CITRATE 50 UG/ML
INJECTION, SOLUTION INTRAMUSCULAR; INTRAVENOUS
Status: DISCONTINUED | OUTPATIENT
Start: 2019-07-25 | End: 2019-07-25 | Stop reason: HOSPADM

## 2019-07-25 RX ORDER — LIDOCAINE HYDROCHLORIDE 10 MG/ML
INJECTION, SOLUTION EPIDURAL; INFILTRATION; INTRACAUDAL; PERINEURAL
Status: DISCONTINUED | OUTPATIENT
Start: 2019-07-25 | End: 2019-07-25 | Stop reason: HOSPADM

## 2019-07-25 RX ORDER — DEXAMETHASONE SODIUM PHOSPHATE 10 MG/ML
INJECTION INTRAMUSCULAR; INTRAVENOUS
Status: DISCONTINUED | OUTPATIENT
Start: 2019-07-25 | End: 2019-07-25 | Stop reason: HOSPADM

## 2019-07-25 RX ORDER — SODIUM CHLORIDE, SODIUM LACTATE, POTASSIUM CHLORIDE, CALCIUM CHLORIDE 600; 310; 30; 20 MG/100ML; MG/100ML; MG/100ML; MG/100ML
INJECTION, SOLUTION INTRAVENOUS ONCE AS NEEDED
Status: COMPLETED | OUTPATIENT
Start: 2019-07-25 | End: 2019-07-25

## 2019-07-25 RX ORDER — BUPIVACAINE HYDROCHLORIDE 2.5 MG/ML
INJECTION, SOLUTION EPIDURAL; INFILTRATION; INTRACAUDAL
Status: DISCONTINUED | OUTPATIENT
Start: 2019-07-25 | End: 2019-07-25 | Stop reason: HOSPADM

## 2019-07-25 RX ORDER — MIDAZOLAM HYDROCHLORIDE 1 MG/ML
INJECTION INTRAMUSCULAR; INTRAVENOUS
Status: DISCONTINUED | OUTPATIENT
Start: 2019-07-25 | End: 2019-07-25 | Stop reason: HOSPADM

## 2019-07-25 RX ADMIN — SODIUM CHLORIDE, SODIUM LACTATE, POTASSIUM CHLORIDE, CALCIUM CHLORIDE: 600; 310; 30; 20 INJECTION, SOLUTION INTRAVENOUS at 01:07

## 2019-07-25 NOTE — PLAN OF CARE
Stable, states ready to go home, parul po fluids, pain tolerable, able to stand without weakness, ambulated to car with RN to son

## 2019-07-25 NOTE — DISCHARGE SUMMARY
[facility]  Discharge Note  Short Stay    Admit Date: 7/25/2019    Discharge Date and Time: 7/25/2019    Attending Physician: Jeyson Kent MD     Discharge Provider: Jeyson Kent    Diagnoses:  Active Hospital Problems    Diagnosis  POA    *Lumbar radiculitis [M54.16]  Yes      Resolved Hospital Problems   No resolved problems to display.       Hospital Course: Lumbar ROSE  Discharged Condition: Good    Final Diagnoses:   Active Hospital Problems    Diagnosis  POA    *Lumbar radiculitis [M54.16]  Yes      Resolved Hospital Problems   No resolved problems to display.       Disposition: Home or Self Care    Follow up/Patient Instructions:    Medications:  Reconciled Home Medications:      Medication List      CONTINUE taking these medications    celecoxib 100 MG capsule  Commonly known as:  CeleBREX  Take 100 mg by mouth 2 (two) times daily.     cyclobenzaprine 10 MG tablet  Commonly known as:  FLEXERIL  Take 1 tablet (10 mg total) by mouth every evening.     estradiol 1 MG tablet  Commonly known as:  ESTRACE  TAKE 1 TABLET BY MOUTH EVERY DAY     gabapentin 300 MG capsule  Commonly known as:  NEURONTIN  Take 2 capsules (600 mg total) by mouth every evening.     HYDROcodone-acetaminophen 5-325 mg per tablet  Commonly known as:  NORCO  Take 1 tablet by mouth every 6 (six) hours as needed for Pain.     orphenadrine 100 mg tablet  Commonly known as:  NORFLEX  Take 1 tablet (100 mg total) by mouth 2 (two) times daily as needed for Muscle spasms.          Discharge Procedure Orders   Call MD for:  temperature >100.4     Call MD for:  persistent nausea and vomiting or diarrhea     Call MD for:  severe uncontrolled pain     Call MD for:  redness, tenderness, or signs of infection (pain, swelling, redness, odor or green/yellow discharge around incision site)     Call MD for:  difficulty breathing or increased cough     Call MD for:  severe persistent headache        Follow up with MD in 2-3 weeks    Discharge Procedure  Orders (must include Diet, Follow-up, Activity):   Discharge Procedure Orders (must include Diet, Follow-up, Activity)   Call MD for:  temperature >100.4     Call MD for:  persistent nausea and vomiting or diarrhea     Call MD for:  severe uncontrolled pain     Call MD for:  redness, tenderness, or signs of infection (pain, swelling, redness, odor or green/yellow discharge around incision site)     Call MD for:  difficulty breathing or increased cough     Call MD for:  severe persistent headache

## 2019-07-30 VITALS
BODY MASS INDEX: 25.26 KG/M2 | WEIGHT: 160.94 LBS | SYSTOLIC BLOOD PRESSURE: 112 MMHG | TEMPERATURE: 98 F | RESPIRATION RATE: 20 BRPM | OXYGEN SATURATION: 97 % | HEART RATE: 80 BPM | HEIGHT: 67 IN | DIASTOLIC BLOOD PRESSURE: 71 MMHG

## 2019-08-05 ENCOUNTER — TELEPHONE (OUTPATIENT)
Dept: ORTHOPEDICS | Facility: CLINIC | Age: 49
End: 2019-08-05

## 2019-08-05 NOTE — TELEPHONE ENCOUNTER
----- Message from Hanna Rebollar sent at 8/2/2019  1:37 PM CDT -----  Contact: patient   Pt called and asked for a refill on her pain medication, Hydrocodone 5mg.    PTs # [phone]

## 2019-08-07 ENCOUNTER — PATIENT MESSAGE (OUTPATIENT)
Dept: PAIN MEDICINE | Facility: CLINIC | Age: 49
End: 2019-08-07

## 2019-08-13 ENCOUNTER — OFFICE VISIT (OUTPATIENT)
Dept: PAIN MEDICINE | Facility: CLINIC | Age: 49
End: 2019-08-13
Payer: COMMERCIAL

## 2019-08-13 VITALS
DIASTOLIC BLOOD PRESSURE: 84 MMHG | BODY MASS INDEX: 25.11 KG/M2 | WEIGHT: 160 LBS | HEART RATE: 96 BPM | SYSTOLIC BLOOD PRESSURE: 142 MMHG | HEIGHT: 67 IN

## 2019-08-13 DIAGNOSIS — M51.36 DDD (DEGENERATIVE DISC DISEASE), LUMBAR: ICD-10-CM

## 2019-08-13 DIAGNOSIS — M47.896 OTHER SPONDYLOSIS, LUMBAR REGION: Primary | ICD-10-CM

## 2019-08-13 DIAGNOSIS — G62.9 PERIPHERAL POLYNEUROPATHY: ICD-10-CM

## 2019-08-13 PROCEDURE — 99999 PR PBB SHADOW E&M-EST. PATIENT-LVL III: CPT | Mod: PBBFAC,,, | Performed by: ANESTHESIOLOGY

## 2019-08-13 PROCEDURE — 99244 PR OFFICE CONSULTATION,LEVEL IV: ICD-10-PCS | Mod: S$GLB,,, | Performed by: ANESTHESIOLOGY

## 2019-08-13 PROCEDURE — 99244 OFF/OP CNSLTJ NEW/EST MOD 40: CPT | Mod: S$GLB,,, | Performed by: ANESTHESIOLOGY

## 2019-08-13 PROCEDURE — 99999 PR PBB SHADOW E&M-EST. PATIENT-LVL III: ICD-10-PCS | Mod: PBBFAC,,, | Performed by: ANESTHESIOLOGY

## 2019-08-13 RX ORDER — TRAMADOL HYDROCHLORIDE 50 MG/1
50 TABLET ORAL EVERY 6 HOURS PRN
Qty: 40 TABLET | Refills: 0 | Status: SHIPPED | OUTPATIENT
Start: 2019-08-13 | End: 2019-08-19

## 2019-08-13 NOTE — PROGRESS NOTES
This note was completed with dictation software and grammatical errors may exist.    Referring Physician: Esdras Estes MD    PCP: Gerardo Buchanan MD      CC: lower back pain, right foot numbness    HPI:   Saima Winkler is a 49 y.o. female  Referred to us lower back pain as well as right foot numbness.  She has been seen by Spine surgery.  She presents to us with worsening lower back pain for the past year.  No recent traumatic incident.  She works as a nurse.  She presents today is with constant aching, sharp pain in lower back.  She has numbness in her bilateral feet, right greater than.  Pain worsens sitting, standing, bending.  Pain improves with rest.  She underwent lumbar ROSE with us in  06/04/2019 as well as  07/24/2019 with minimal relief.  She has not had any formal physical therapy.  She currently takes Celebrex,  Gabapentin with mild benefits.  She denies any worsening weakness.  No bowel bladder changes.    ROS:  CONSTITUTIONAL: No fevers, chills, night sweats, wt. loss, appetite changes  SKIN: no rashes or itching  ENT: No headaches, head trauma, vision changes, or eye pain  LYMPH NODES: None noticed   CV: No chest pain, palpitations.   RESP: No shortness of breath, dyspnea on exertion, cough, wheezing, or hemoptysis  GI: No nausea, emesis, diarrhea, constipation, melena, hematochezia, pain.    : No dysuria, hematuria, urgency, or frequency   HEME: No easy bruising, bleeding problems  PSYCHIATRIC: No depression, anxiety, psychosis, hallucinations.  NEURO: No seizures, memory loss, dizziness or difficulty sleeping  MSK:   Positive HPI      Past Medical History:   Diagnosis Date    Anxiety     Depression      Past Surgical History:   Procedure Laterality Date    HYSTERECTOMY      Injection,steroid,epidural,transforaminal approach Bilateral 7/25/2019    [facility]    Injection,steroid,epidural,transforaminal approach L4-5 Bilateral 6/4/2019    Performed by Jeyson DEL CASTILLO  "MD Donte at [facility] OR    KNEE SURGERY      SHOULDER SURGERY      vaginal sling       Family History   Problem Relation Age of Onset    Asthma Mother     Depression Mother     Hypertension Mother     Heart disease Father     Depression Father     Hypertension Father      Social History     Socioeconomic History    Marital status:      Spouse name: Not on file    Number of children: 3    Years of education: Not on file    Highest education level: Not on file   Occupational History    Occupation: Registered Nurse      Employer: [employer]     Comment: [employer]   Social Needs    Financial resource strain: Not on file    Food insecurity:     Worry: Not on file     Inability: Not on file    Transportation needs:     Medical: Not on file     Non-medical: Not on file   Tobacco Use    Smoking status: Former Smoker     Last attempt to quit: 2007     Years since quittin.6    Smokeless tobacco: Never Used   Substance and Sexual Activity    Alcohol use: Yes    Drug use: No    Sexual activity: Yes     Partners: Male   Lifestyle    Physical activity:     Days per week: Not on file     Minutes per session: Not on file    Stress: Not on file   Relationships    Social connections:     Talks on phone: Not on file     Gets together: Not on file     Attends Faith service: Not on file     Active member of club or organization: Not on file     Attends meetings of clubs or organizations: Not on file     Relationship status: Not on file   Other Topics Concern    Not on file   Social History Narrative    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         Medications/Allergies: See med card    Vitals:    19 0948   BP: (!) 142/84   Pulse: 96   Weight: 72.6 kg (160 lb)   Height: 5' 7" (1.702 m)   PainSc:   6   PainLoc: Back         Physical exam:    GENERAL: A and O x3, the patient appears well groomed and is in no acute distress.  Skin: No rashes or obvious " lesions  HEENT: normocephalic, atraumatic  CARDIOVASCULAR:  Palpable peripheral pulses  LUNGS: easy work of breathing  ABDOMEN: soft, nontender   UPPER EXTREMITIES: Normal alignment, normal range of motion, no atrophy, no skin changes,  hair growth and nail growth normal and equal bilaterally. No swelling, no tenderness.    LOWER EXTREMITIES:  Normal alignment, normal range of motion, no atrophy, no skin changes,  hair growth and nail growth normal and equal bilaterally. No swelling, no tenderness.  LUMBAR SPINE  Lumbar spine: ROM is  Mildly limited with flexion extension and oblique extension with  Mild-to-moderate increased pain.    Juan's test causes no increased pain on either side.    Supine straight leg raise is negative bilaterally.    Internal and external rotation of the hip causes no increased pain on either side.  Myofascial exam: No tenderness to palpation across lumbar paraspinous muscles.      MENTAL STATUS: normal orientation, speech, language, and fund of knowledge for social situation.  Emotional state appropriate.    CRANIAL NERVES:  II:  PERRL bilaterally,   III,IV,VI: EOMI.    V:  Facial sensation equal bilaterally  VII:  Facial motor function normal.  VIII:  Hearing equal to finger rub bilaterally  IX/X: Gag normal, palate symmetric  XI:  Shoulder shrug equal, head turn equal  XII:  Tongue midline without fasciculations      MOTOR: Tone and bulk: normal bilateral upper and lower Strength: normal   Delt Bi Tri WE WF     R 5 5 5 5 5 5   L 5 5 5 5 5 5     IP ADD ABD Quad TA Gas HAM  R 5 5 5 5 5 5 5  L 5 5 5 5 5 5 5    SENSATION: Light touch and pinprick intact bilaterally  REFLEXES: normal, symmetric, nonbrisk.  Toes down, no clonus. No hoffmans.  GAIT: normal rise, base, steps, and arm swing.        Imaging:   MRI lumbar spine without contrast 05/08/2019 ( outside imaging )  L4-5, broad-based bulge with marginal osteophyte formation asymmetric to the left.  Mild central canal stenosis.   Facet arthropathy.  Moderate degree of left foraminal encroachment.  L5-S1, broad-based disc bulge.  Mild facet changes.  Mild-to-moderate right foraminal narrowing and moderate degree of left foraminal narrowing    Assessment:  Patient referred for low back pain  1. Other spondylosis, lumbar region    2. DDD (degenerative disc disease), lumbar    3. Peripheral polyneuropathy        Plan:  1. I have stressed the importance of physical activity and exercise to improve overall health  2. Reviewed pertinent imaging and records with patient  3. I believe her low back pain maybe due to facet arthropathy and have recommended lumbar medial branch blocks as a diagnostic procedure.  If successful, would proceed with radiofrequency ablation.  4.  I suspect patient with a degree of peripheral neuropathy to explain her ft numbness.  Continue gabapentin as prescribed.  May consider increased the gabapentin for further anti neuropathic adjunct.  Discussed briefly about EMG study for further evaluation if desired.  5. Prescription Tramadol given to help her pain while she continues to work.  She understands this is not a long-term treatment option.  6. She will follow up after the procedure.      Thank you for referring this interesting patient, and I look forward to continuing to collaborate in her care.

## 2019-08-19 ENCOUNTER — OFFICE VISIT (OUTPATIENT)
Dept: ORTHOPEDICS | Facility: CLINIC | Age: 49
End: 2019-08-19
Payer: COMMERCIAL

## 2019-08-19 ENCOUNTER — OFFICE VISIT (OUTPATIENT)
Dept: FAMILY MEDICINE | Facility: CLINIC | Age: 49
End: 2019-08-19
Payer: COMMERCIAL

## 2019-08-19 VITALS
HEIGHT: 67 IN | WEIGHT: 159 LBS | DIASTOLIC BLOOD PRESSURE: 82 MMHG | HEART RATE: 102 BPM | SYSTOLIC BLOOD PRESSURE: 132 MMHG | BODY MASS INDEX: 24.96 KG/M2

## 2019-08-19 VITALS
HEART RATE: 88 BPM | BODY MASS INDEX: 25.11 KG/M2 | DIASTOLIC BLOOD PRESSURE: 86 MMHG | WEIGHT: 160 LBS | SYSTOLIC BLOOD PRESSURE: 126 MMHG | HEIGHT: 67 IN

## 2019-08-19 DIAGNOSIS — M51.36 DISC DEGENERATION, LUMBAR: ICD-10-CM

## 2019-08-19 DIAGNOSIS — K58.1 IRRITABLE BOWEL SYNDROME WITH CONSTIPATION: ICD-10-CM

## 2019-08-19 DIAGNOSIS — M51.36 BULGING OF LUMBAR INTERVERTEBRAL DISC: ICD-10-CM

## 2019-08-19 DIAGNOSIS — M54.16 LUMBAR RADICULOPATHY: ICD-10-CM

## 2019-08-19 DIAGNOSIS — R52 TOTAL BODY PAIN: Primary | ICD-10-CM

## 2019-08-19 DIAGNOSIS — Z12.39 SCREENING FOR BREAST CANCER: ICD-10-CM

## 2019-08-19 DIAGNOSIS — N95.1 HOT FLASHES DUE TO MENOPAUSE: ICD-10-CM

## 2019-08-19 DIAGNOSIS — M48.061 FORAMINAL STENOSIS OF LUMBAR REGION: ICD-10-CM

## 2019-08-19 PROCEDURE — 3008F PR BODY MASS INDEX (BMI) DOCUMENTED: ICD-10-PCS | Mod: S$GLB,,, | Performed by: ORTHOPAEDIC SURGERY

## 2019-08-19 PROCEDURE — 3008F PR BODY MASS INDEX (BMI) DOCUMENTED: ICD-10-PCS | Mod: S$GLB,,, | Performed by: INTERNAL MEDICINE

## 2019-08-19 PROCEDURE — 99999 PR PBB SHADOW E&M-EST. PATIENT-LVL IV: CPT | Mod: PBBFAC,,, | Performed by: INTERNAL MEDICINE

## 2019-08-19 PROCEDURE — 99214 PR OFFICE/OUTPT VISIT, EST, LEVL IV, 30-39 MIN: ICD-10-PCS | Mod: S$GLB,,, | Performed by: INTERNAL MEDICINE

## 2019-08-19 PROCEDURE — 99213 OFFICE O/P EST LOW 20 MIN: CPT | Mod: S$GLB,,, | Performed by: ORTHOPAEDIC SURGERY

## 2019-08-19 PROCEDURE — 3008F BODY MASS INDEX DOCD: CPT | Mod: S$GLB,,, | Performed by: ORTHOPAEDIC SURGERY

## 2019-08-19 PROCEDURE — 99999 PR PBB SHADOW E&M-EST. PATIENT-LVL IV: ICD-10-PCS | Mod: PBBFAC,,, | Performed by: INTERNAL MEDICINE

## 2019-08-19 PROCEDURE — 3008F BODY MASS INDEX DOCD: CPT | Mod: S$GLB,,, | Performed by: INTERNAL MEDICINE

## 2019-08-19 PROCEDURE — 99213 PR OFFICE/OUTPT VISIT, EST, LEVL III, 20-29 MIN: ICD-10-PCS | Mod: S$GLB,,, | Performed by: ORTHOPAEDIC SURGERY

## 2019-08-19 PROCEDURE — 99214 OFFICE O/P EST MOD 30 MIN: CPT | Mod: S$GLB,,, | Performed by: INTERNAL MEDICINE

## 2019-08-19 RX ORDER — PHENTERMINE HYDROCHLORIDE 37.5 MG/1
TABLET ORAL
Refills: 0 | COMMUNITY
Start: 2019-08-17 | End: 2020-06-24

## 2019-08-19 RX ORDER — ORPHENADRINE CITRATE 100 MG/1
100 TABLET, EXTENDED RELEASE ORAL DAILY
Qty: 30 TABLET | Refills: 5 | Status: SHIPPED | OUTPATIENT
Start: 2019-08-19 | End: 2020-06-24

## 2019-08-19 RX ORDER — ESTRADIOL 1 MG/1
1 TABLET ORAL DAILY
Qty: 90 TABLET | Refills: 1 | Status: SHIPPED | OUTPATIENT
Start: 2019-08-19 | End: 2020-02-21

## 2019-08-19 RX ORDER — ORPHENADRINE CITRATE 100 MG/1
TABLET, EXTENDED RELEASE ORAL
Refills: 0 | COMMUNITY
Start: 2019-08-16 | End: 2019-08-19 | Stop reason: SDUPTHER

## 2019-08-19 RX ORDER — GABAPENTIN 300 MG/1
900 CAPSULE ORAL NIGHTLY
Qty: 90 CAPSULE | Refills: 2 | Status: SHIPPED | OUTPATIENT
Start: 2019-08-19 | End: 2019-11-10 | Stop reason: SDUPTHER

## 2019-08-19 NOTE — PROGRESS NOTES
Subjective:       Patient ID: Saima Winkler is a 49 y.o. female.    Chief Complaint: Pain of the Lumbar Spine (Lumbar is worse since having injections by Dr Kent. Pain now radiates to left buttock and numbness down both legs to feet.)      History of Present Illness  Patient is here to follow-up for back pain with bilateral lower extremity radiculitis. Most recently she was referred for bilateral L3 and L4 nerve root transforaminal ROSE. She had these done with Dr. Kent but they did not help with her lower extremity symptoms and actually made her low back pain worse. She was told by Dr. Kent that her lower extremity neurological symptoms are not related to her lumbar spine. Also when she was last here we started her with Neurontin and she is currently taking 900 mg daily at bedtime in his does help. The Norflex that she is taking helps as well but does cause some sedation.    Current Medications  Current Outpatient Medications   Medication Sig Dispense Refill    ADIPEX-P 37.5 mg tablet TK 1 T PO QAM  0    celecoxib (CELEBREX) 100 MG capsule Take 100 mg by mouth 2 (two) times daily.  3    cyclobenzaprine (FLEXERIL) 10 MG tablet Take 1 tablet (10 mg total) by mouth every evening. 30 tablet 1    estradiol (ESTRACE) 1 MG tablet TAKE 1 TABLET BY MOUTH EVERY DAY 30 tablet 1    gabapentin (NEURONTIN) 300 MG capsule Take 3 capsules (900 mg total) by mouth every evening. 90 capsule 2    orphenadrine (NORFLEX) 100 mg tablet   0     No current facility-administered medications for this visit.        Allergies  Review of patient's allergies indicates:  No Known Allergies    Past Medical History  Past Medical History:   Diagnosis Date    Anxiety     Depression        Surgical History  Past Surgical History:   Procedure Laterality Date    HYSTERECTOMY      Injection,steroid,epidural,transforaminal approach Bilateral 7/25/2019    [facility]    Injection,steroid,epidural,transforaminal approach  L4-5 Bilateral 2019    [facility] at [facility]    KNEE SURGERY      SHOULDER SURGERY      vaginal sling         Family History:   Family History   Problem Relation Age of Onset    Asthma Mother     Depression Mother     Hypertension Mother     Heart disease Father     Depression Father     Hypertension Father        Social History:   Social History     Socioeconomic History    Marital status:      Spouse name: Not on file    Number of children: 3    Years of education: Not on file    Highest education level: Not on file   Occupational History    Occupation: Registered Nurse      Employer: [employer]     Comment: [employer]   Social Needs    Financial resource strain: Not on file    Food insecurity:     Worry: Not on file     Inability: Not on file    Transportation needs:     Medical: Not on file     Non-medical: Not on file   Tobacco Use    Smoking status: Former Smoker     Last attempt to quit: 2007     Years since quittin.6    Smokeless tobacco: Never Used   Substance and Sexual Activity    Alcohol use: Yes    Drug use: No    Sexual activity: Yes     Partners: Male   Lifestyle    Physical activity:     Days per week: Not on file     Minutes per session: Not on file    Stress: Not on file   Relationships    Social connections:     Talks on phone: Not on file     Gets together: Not on file     Attends Worship service: Not on file     Active member of club or organization: Not on file     Attends meetings of clubs or organizations: Not on file     Relationship status: Not on file   Other Topics Concern    Not on file   Social History Narrative    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       Hospitalization/Major Diagnostic Procedure:     Review of Systems     General/Constitutional:  Chills denies. Fatigue denies. Fever denies. Weight gain denies. Weight loss denies.    Respiratory:  Shortness of breath  denies.    Cardiovascular:  Chest pain denies.    Gastrointestinal:  Constipation denies. Diarrhea denies. Nausea denies. Vomiting denies.     Hematology:  Easy bruising denies. Prolonged bleeding denies.     Genitourinary:  Frequent urination denies. Pain in lower back denies. Painful urination denies.     Musculoskeletal:  See HPI for details    Skin:  Rash denies.    Neurologic:  Dizziness denies. Gait abnormalities denies. Seizures denies. Tingling/Numbess denies.    Psychiatric:  Anxiety denies. Depressed mood denies.     Objective:   Vital Signs:   Vitals:    08/19/19 0919   BP: 132/82   Pulse: 102        Physical Exam      General Examination:     Constitutional: The patient is alert and oriented to lace person and time. Mood is pleasant.     Head/Face: Normal facial features normal eyebrows    Eyes: Normal extraocular motion bilaterally    Lungs: Respirations are equal and unlabored    Gait is coordinated.    Cardiovascular: There are no swelling or varicosities present.    Lymphatic: Negative for adenopathy    Skin: Normal    Neurological: Level of consciousness normal. Oriented to place person and time and situation    Psychiatric: Oriented to time place person and situation    Lumbar exam mild tenderness L4-S1 without spasm. Mild range of motion limitations straight leg raising weakly positive left side reflexes hypoactive patella and Achilles pulses intact. SI joint provocative testing bilaterally is negative    XRAY Report/ Interpretation: New studies today      Assessment:       1. Lumbar radiculopathy    2. Disc degeneration, lumbar    3. Bulging of lumbar intervertebral disc    4. Foraminal stenosis of lumbar region        Plan:       Saima was seen today for pain.    Diagnoses and all orders for this visit:    Lumbar radiculopathy  -     gabapentin (NEURONTIN) 300 MG capsule; Take 3 capsules (900 mg total) by mouth every evening.    Disc degeneration, lumbar  -     gabapentin (NEURONTIN) 300 MG  "capsule; Take 3 capsules (900 mg total) by mouth every evening.    Bulging of lumbar intervertebral disc  -     gabapentin (NEURONTIN) 300 MG capsule; Take 3 capsules (900 mg total) by mouth every evening.    Foraminal stenosis of lumbar region  -     gabapentin (NEURONTIN) 300 MG capsule; Take 3 capsules (900 mg total) by mouth every evening.         No follow-ups on file.  Donnie Diego, physician's assistant served in the capacity as a "scribe" for this patient encounter  A "face to face" encounter occurred with Dr. Estes on this date  The treatment plan and medical decision making is outlined below:  Patient will be referred to Dr. Butch Juárez to evaluate and treat her. She did not have a good experience with Dr. Kent and does not want to follow-up with them. We will give this 3 months and if there is no improvement we will discuss surgery    This note was created using Dragon voice recognition software that occasionally misinterpreted phrases or words.  "

## 2019-08-19 NOTE — PROGRESS NOTES
Subjective:       Patient ID: Saima Winkler is a 49 y.o. female.    Chief Complaint: Hot Flashes; Foot Pain; and Generalized Body Aches    Pt is a 49-year-old  female who has been experiencing generalized body aches, myalgias and arthralgias.  Most significant pain seems to be in the low back region with some radiation bilaterally to the outer aspect of the feet.  No perineal numbness.  No history of fall or injury.  Thus far she has not been diagnosed with any malignancy.  No fevers.  She is not on any anticoagulation.    She also has been experiencing hot flashes.  She is status post hysterectomy and suspects that she might be going through menopausal phase.    She has taken prescription for some Flexeril in past which makes her drowsy but not merary seems to be more helpful.  She also takes Celebrex as needed.    Currently she has been prescribed Adipex P by a general practitioner.  This is to help her weight loss.    She has on and off constipation fall with increased bowel movements.  It is suspected that she has IBS -C.    Back Pain   This is a chronic problem. The current episode started more than 1 year ago. The problem occurs intermittently. The problem has been waxing and waning since onset. The pain is present in the lumbar spine. The pain radiates to the left foot and right foot. The pain is at a severity of 4/10. The pain is moderate. The pain is worse during the night. Associated symptoms include weight loss (Intentional on watching diet.). Pertinent negatives include no abdominal pain, bladder incontinence, bowel incontinence, chest pain, dysuria, fever, headaches, leg pain, numbness, paresis, paresthesias, pelvic pain or weakness. Risk factors include menopause. She has tried NSAIDs and muscle relaxant for the symptoms. The treatment provided moderate relief.       Past Medical History:   Diagnosis Date    Anxiety     Depression      Social History     Socioeconomic History    Marital  status:      Spouse name: Not on file    Number of children: 3    Years of education: Not on file    Highest education level: Not on file   Occupational History    Occupation: Registered Nurse      Employer: [employer]     Comment: Cancer Center   Social Needs    Financial resource strain: Not on file    Food insecurity:     Worry: Not on file     Inability: Not on file    Transportation needs:     Medical: Not on file     Non-medical: Not on file   Tobacco Use    Smoking status: Former Smoker     Last attempt to quit: 2007     Years since quittin.6    Smokeless tobacco: Never Used   Substance and Sexual Activity    Alcohol use: Yes    Drug use: No    Sexual activity: Yes     Partners: Male   Lifestyle    Physical activity:     Days per week: Not on file     Minutes per session: Not on file    Stress: Very much   Relationships    Social connections:     Talks on phone: Not on file     Gets together: Not on file     Attends [employer] service: Not on file     Active member of club or organization: Not on file     Attends meetings of clubs or organizations: Not on file     Relationship status: Not on file   Other Topics Concern    Not on file   Social History Narrative    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     Past Surgical History:   Procedure Laterality Date    HYSTERECTOMY      Injection,steroid,epidural,transforaminal approach Bilateral 2019    Performed by [facility] at [facility] OR    Injection,steroid,epidural,transforaminal approach L4-5 Bilateral 2019    Performed by [facility] at [facility] OR    KNEE SURGERY      SHOULDER SURGERY      vaginal sling       Family History   Problem Relation Age of Onset    Asthma Mother     Depression Mother     Hypertension Mother     Heart disease Father     Depression Father     Hypertension Father        Review of Systems   Constitutional: Positive for weight loss (Intentional on  "watching diet.). Negative for activity change, appetite change, fever and unexpected weight change (Intentional weight loss on Adipex-P.).   HENT: Negative for congestion, ear discharge, ear pain, sore throat, trouble swallowing and voice change.    Eyes: Negative for photophobia, pain, discharge and visual disturbance.   Respiratory: Negative for cough, chest tightness and shortness of breath.    Cardiovascular: Negative for chest pain and palpitations.   Gastrointestinal: Negative for abdominal pain, bowel incontinence, nausea and vomiting.   Endocrine: Negative for cold intolerance and heat intolerance.   Genitourinary: Negative for bladder incontinence, difficulty urinating, dysuria and pelvic pain.   Musculoskeletal: Positive for arthralgias, back pain and myalgias. Negative for gait problem.   Skin: Negative for rash.   Allergic/Immunologic: Negative for immunocompromised state.   Neurological: Negative for speech difficulty, weakness, numbness, headaches and paresthesias.   Psychiatric/Behavioral: Negative for agitation, confusion, self-injury and suicidal ideas.         Objective:      Blood pressure 126/86, pulse 88, height 5' 7" (1.702 m), weight 72.6 kg (160 lb). Body mass index is 25.06 kg/m².  Physical Exam   Constitutional: She appears well-developed and well-nourished. She is cooperative. No distress.   HENT:   Head: Normocephalic and atraumatic.   Eyes: Pupils are equal, round, and reactive to light. Conjunctivae and lids are normal. Lids are everted and swept, no foreign bodies found. Right pupil is round and reactive. Left pupil is round and reactive.   Neck: Trachea normal and normal range of motion. Neck supple.       Right neck scar secondary to anterior cervical disc fusion   Cardiovascular: Normal rate, regular rhythm, S1 normal, S2 normal and normal heart sounds.   Pulmonary/Chest: Breath sounds normal.   Abdominal: Soft. Bowel sounds are normal. There is no rigidity and no guarding. "   Musculoskeletal: She exhibits no tenderness or deformity.        Lumbar back: She exhibits spasm. She exhibits normal range of motion.        Back:    No obvious deformities noted except probably for mild curvature in the lumbar spine.    Mild-to-moderate paraspinal spasm is noted.    Patient is areflexic symmetrically.    Motor strength is normal. SLR is more positive on the right side as compared to the left side.    Peripheral pulses are palpable.   Neurological: She is alert. She displays no atrophy and no tremor. She exhibits normal muscle tone. She displays no seizure activity.   Reflex Scores:       Tricep reflexes are 0 on the right side and 0 on the left side.       Bicep reflexes are 0 on the right side and 0 on the left side.       Brachioradialis reflexes are 0 on the right side and 0 on the left side.       Patellar reflexes are 0 on the right side and 0 on the left side.       Achilles reflexes are 0 on the right side and 0 on the left side.  Skin: Skin is warm and dry.   Psychiatric: Her behavior is normal.   Somewhat anhedonic mood.   Nursing note and vitals reviewed.        Assessment:       1. Total body pain    2. Hot flashes due to menopause    3. Lumbar radiculopathy    4. Screening for breast cancer    5. Irritable bowel syndrome with constipation           No visits with results within 3 Month(s) from this visit.   Latest known visit with results is:   No results found for any previous visit.         Plan:           Total body pain  -     CBC auto differential; Future; Expected date: 08/19/2019  -     Comprehensive metabolic panel; Future; Expected date: 08/19/2019  -     Sedimentation rate; Future; Expected date: 08/19/2019  -     C-reactive protein; Future; Expected date: 08/19/2019  -     Rheumatoid factor; Future; Expected date: 08/19/2019  -     Cyclic citrul peptide antibody, IgG; Future; Expected date: 08/19/2019  -     DARRELL Screen w/rflx; Future; Expected date: 08/19/2019    Hot  flashes due to menopause  -     estradiol (ESTRACE) 1 MG tablet; Take 1 tablet (1 mg total) by mouth once daily.  Dispense: 90 tablet; Refill: 1    Lumbar radiculopathy  -     orphenadrine (NORFLEX) 100 mg tablet; Take 1 tablet (100 mg total) by mouth once daily.  Dispense: 30 tablet; Refill: 5    Screening for breast cancer  -     Mammo Digital Screening Bilat; Future; Expected date: 08/19/2019    Irritable bowel syndrome with constipation  -     linaCLOtide (LINZESS) 145 mcg Cap capsule; Take 1 capsule (145 mcg total) by mouth once daily.  Dispense: 30 capsule; Refill: 5     Given her wide spectrum of pain and discomfort I will repeat the rheumatological workup again including sed rate, CRP, rheumatoid factor,  CCP antibodies and DARRELL screen with reflex.    Continue with estradiol for hot flashes.    Continue with Norflex for muscle relaxation    Adipex P may  also tend to cause constipation especially with decreased appetite.  Consider multivitamin and B complex supplementation.  Over-the-counter supplements like glucosamine chondroitin may be helpful for joint pains.  Fish oil capsules or Omega 3 fatty acids may also be helpful.  Anecdotal reports about apple cider vinegar also.  .  Trial of Linzess    Pt seen with Chaperone.    Spent more than 25 minutes with patient which involved review of his medical conditions, labs, medications and with 50% of time face-to-face discussion about medical problems, management and any applicable changes.        Current Outpatient Medications:     ADIPEX-P 37.5 mg tablet, TK 1 T PO QAM, Disp: , Rfl: 0    celecoxib (CELEBREX) 100 MG capsule, Take 100 mg by mouth 2 (two) times daily., Disp: , Rfl: 3    estradiol (ESTRACE) 1 MG tablet, Take 1 tablet (1 mg total) by mouth once daily., Disp: 90 tablet, Rfl: 1    gabapentin (NEURONTIN) 300 MG capsule, Take 3 capsules (900 mg total) by mouth every evening., Disp: 90 capsule, Rfl: 2    orphenadrine (NORFLEX) 100 mg tablet, Take 1  tablet (100 mg total) by mouth once daily., Disp: 30 tablet, Rfl: 5    linaCLOtide (LINZESS) 145 mcg Cap capsule, Take 1 capsule (145 mcg total) by mouth once daily., Disp: 30 capsule, Rfl: 5

## 2019-08-20 NOTE — PATIENT INSTRUCTIONS
Relieving Back Pain  Back pain is a common problem. You can strain back muscles by lifting too much weight or just by moving the wrong way. Back strain can be uncomfortable, even painful. And it can take weeks or months to improve. To help yourself feel better and prevent future back strains, try these tips.  Important Note: Do not give aspirin to children or teens without first discussing it with your healthcare provider.      ? Ice    Ice reduces muscle pain and swelling. It helps most during the first 24 to 48 hours after an injury.  · Wrap an ice pack or a bag of frozen peas in a thin towel. (Never place ice directly on your skin.)  · Place the ice where your back hurts the most.  · Dont ice for more than 20 minutes at a time.  · You can use ice several times a day.  ? Medicines  Over-the-counter pain relievers can include acetaminophen and anti-inflammatory medicines, which includes aspirin or ibuprofen. They can help ease discomfort. Some also reduce swelling.  · Tell your healthcare provider about any medicines you are already taking.  · Take medicines only as directed.  ? Heat  After the first 48 hours, heat can relax sore muscles and improve blood flow.  · Try a warm bath or shower. Or use a heating pad set on low. To prevent a burn, keep a cloth between you and the heating pad.  · Dont use a heating pad for more than 15 minutes at a time. Never sleep on a heating pad.  Date Last Reviewed: 9/1/2015  © 6973-2006 [employer]. [address] [place] [address]. All rights reserved. This information is not intended as a substitute for professional medical care. Always follow your healthcare professional's instructions.        Causes of Lumbar (Low Back) Pain  Low back pain can be caused by problems with any part of the lumbar spine. A disk can herniate (push out) and press on a nerve. Vertebrae can rub against each other or slip out of place. This can irritate facet joints and nerves.  It can also lead to stenosis, a narrowing of the spinal canal or foramen.  Pressure from a disk  Constant wear and tear on a disk can cause it to weaken and push outward. Part of the disk may then press on nearby nerves. There are two common types of herniated disks:  Contained means the soft nucleus is protruding outward.   Extruded means the firm annulus has torn, letting the soft center squeeze through.     Pressure from bone  An unstable spine   With age, a disk may thin and wear out. Vertebrae above and below the disk may begin to touch. This can put pressure on nerves. It can also cause bone spurs (growths) to form where the bones rub together.    Stenosis results when bone spurs narrow the foramen or spinal canal. This also puts pressure on nerves. Slipping vertebrae can irritate nerves and joints. They can also worsen stenosis.    In some cases, vertebrae become unstable and slip forward. This is called spondylolisthesis.     Date Last Reviewed: 10/12/2015  © 5085-3403 [employer]. [address] [place] [address], [place] [address]. All rights reserved. This information is not intended as a substitute for professional medical care. Always follow your healthcare professional's instructions.

## 2019-08-21 LAB
ALBUMIN SERPL-MCNC: 4.3 G/DL (ref 3.6–5.1)
ALBUMIN/GLOB SERPL: 1.9 (CALC) (ref 1–2.5)
ALP SERPL-CCNC: 37 U/L (ref 33–115)
ALT SERPL-CCNC: 19 U/L (ref 6–29)
ANA SER QL IF: NEGATIVE
AST SERPL-CCNC: 23 U/L (ref 10–35)
BASOPHILS # BLD AUTO: 48 CELLS/UL (ref 0–200)
BASOPHILS NFR BLD AUTO: 0.9 %
BILIRUB SERPL-MCNC: 0.4 MG/DL (ref 0.2–1.2)
BUN SERPL-MCNC: 12 MG/DL (ref 7–25)
BUN/CREAT SERPL: NORMAL (CALC) (ref 6–22)
CALCIUM SERPL-MCNC: 9.2 MG/DL (ref 8.6–10.2)
CCP IGG SERPL-ACNC: <16 UNITS
CHLORIDE SERPL-SCNC: 103 MMOL/L (ref 98–110)
CO2 SERPL-SCNC: 29 MMOL/L (ref 20–32)
CREAT SERPL-MCNC: 0.84 MG/DL (ref 0.5–1.1)
CRP SERPL-MCNC: 1.5 MG/L
EOSINOPHIL # BLD AUTO: 69 CELLS/UL (ref 15–500)
EOSINOPHIL NFR BLD AUTO: 1.3 %
ERYTHROCYTE [DISTWIDTH] IN BLOOD BY AUTOMATED COUNT: 11.8 % (ref 11–15)
ERYTHROCYTE [SEDIMENTATION RATE] IN BLOOD BY WESTERGREN METHOD: 2 MM/H
GFRSERPLBLD MDRD-ARVRAT: 82 ML/MIN/1.73M2
GLOBULIN SER CALC-MCNC: 2.3 G/DL (CALC) (ref 1.9–3.7)
GLUCOSE SERPL-MCNC: 102 MG/DL (ref 65–139)
HCT VFR BLD AUTO: 37.6 % (ref 35–45)
HGB BLD-MCNC: 12.4 G/DL (ref 11.7–15.5)
LYMPHOCYTES # BLD AUTO: 1664 CELLS/UL (ref 850–3900)
LYMPHOCYTES NFR BLD AUTO: 31.4 %
MCH RBC QN AUTO: 30.4 PG (ref 27–33)
MCHC RBC AUTO-ENTMCNC: 33 G/DL (ref 32–36)
MCV RBC AUTO: 92.2 FL (ref 80–100)
MONOCYTES # BLD AUTO: 472 CELLS/UL (ref 200–950)
MONOCYTES NFR BLD AUTO: 8.9 %
NEUTROPHILS # BLD AUTO: 3048 CELLS/UL (ref 1500–7800)
NEUTROPHILS NFR BLD AUTO: 57.5 %
PLATELET # BLD AUTO: 268 THOUSAND/UL (ref 140–400)
PMV BLD REES-ECKER: 9.9 FL (ref 7.5–12.5)
POTASSIUM SERPL-SCNC: 4.3 MMOL/L (ref 3.5–5.3)
PROT SERPL-MCNC: 6.6 G/DL (ref 6.1–8.1)
RBC # BLD AUTO: 4.08 MILLION/UL (ref 3.8–5.1)
RHEUMATOID FACT SERPL-ACNC: <14 IU/ML
SODIUM SERPL-SCNC: 139 MMOL/L (ref 135–146)
WBC # BLD AUTO: 5.3 THOUSAND/UL (ref 3.8–10.8)

## 2019-08-22 ENCOUNTER — PATIENT MESSAGE (OUTPATIENT)
Dept: FAMILY MEDICINE | Facility: CLINIC | Age: 49
End: 2019-08-22

## 2019-08-27 ENCOUNTER — HOSPITAL ENCOUNTER (OUTPATIENT)
Dept: RADIOLOGY | Facility: HOSPITAL | Age: 49
Discharge: HOME | End: 2019-08-27
Attending: INTERNAL MEDICINE
Payer: COMMERCIAL

## 2019-08-27 VITALS — HEIGHT: 67 IN | WEIGHT: 160 LBS | BODY MASS INDEX: 25.11 KG/M2

## 2019-08-27 DIAGNOSIS — Z12.39 SCREENING FOR BREAST CANCER: ICD-10-CM

## 2019-08-27 PROCEDURE — 77067 SCR MAMMO BI INCL CAD: CPT | Mod: TC,PO

## 2019-09-03 ENCOUNTER — PATIENT MESSAGE (OUTPATIENT)
Dept: FAMILY MEDICINE | Facility: CLINIC | Age: 49
End: 2019-09-03

## 2019-09-03 DIAGNOSIS — R52 TOTAL BODY PAIN: Primary | ICD-10-CM

## 2019-09-03 NOTE — TELEPHONE ENCOUNTER
Patient request rheumatology evaluation for multiple aches and pains.  She is also prone to injuries and cartilage damage.  She is suspecting connective tissue disorder.

## 2019-09-17 ENCOUNTER — TELEPHONE (OUTPATIENT)
Dept: FAMILY MEDICINE | Facility: CLINIC | Age: 49
End: 2019-09-17

## 2019-09-17 DIAGNOSIS — K58.1 IRRITABLE BOWEL SYNDROME WITH CONSTIPATION: Primary | ICD-10-CM

## 2019-09-17 NOTE — TELEPHONE ENCOUNTER
Pt called requesting PA on Linzess. Pt stated that she has tried good Rx coupoun and the medication is still $300. Pt states that she will try different medication. Please advise.    I will give patient samples of 72 of Linzess.  See how it does

## 2019-09-20 ENCOUNTER — PATIENT MESSAGE (OUTPATIENT)
Dept: FAMILY MEDICINE | Facility: CLINIC | Age: 49
End: 2019-09-20

## 2019-09-24 ENCOUNTER — PATIENT MESSAGE (OUTPATIENT)
Dept: FAMILY MEDICINE | Facility: CLINIC | Age: 49
End: 2019-09-24

## 2019-09-30 ENCOUNTER — TELEPHONE (OUTPATIENT)
Dept: FAMILY MEDICINE | Facility: CLINIC | Age: 49
End: 2019-09-30

## 2019-09-30 ENCOUNTER — OFFICE VISIT (OUTPATIENT)
Dept: ORTHOPEDICS | Facility: CLINIC | Age: 49
End: 2019-09-30
Payer: COMMERCIAL

## 2019-09-30 ENCOUNTER — OFFICE VISIT (OUTPATIENT)
Dept: FAMILY MEDICINE | Facility: CLINIC | Age: 49
End: 2019-09-30
Payer: COMMERCIAL

## 2019-09-30 VITALS
HEIGHT: 67 IN | HEART RATE: 81 BPM | WEIGHT: 156 LBS | SYSTOLIC BLOOD PRESSURE: 120 MMHG | DIASTOLIC BLOOD PRESSURE: 85 MMHG | BODY MASS INDEX: 24.48 KG/M2

## 2019-09-30 VITALS
WEIGHT: 156 LBS | HEIGHT: 67 IN | DIASTOLIC BLOOD PRESSURE: 80 MMHG | BODY MASS INDEX: 24.48 KG/M2 | SYSTOLIC BLOOD PRESSURE: 124 MMHG | HEART RATE: 85 BPM

## 2019-09-30 DIAGNOSIS — M51.36 DISC DEGENERATION, LUMBAR: ICD-10-CM

## 2019-09-30 DIAGNOSIS — K58.1 IRRITABLE BOWEL SYNDROME WITH CONSTIPATION: Primary | ICD-10-CM

## 2019-09-30 DIAGNOSIS — G57.00 PIRIFORMIS SYNDROME, UNSPECIFIED LATERALITY: Primary | ICD-10-CM

## 2019-09-30 DIAGNOSIS — M51.36 BULGING OF LUMBAR INTERVERTEBRAL DISC: ICD-10-CM

## 2019-09-30 PROCEDURE — 99213 OFFICE O/P EST LOW 20 MIN: CPT | Mod: S$GLB,,, | Performed by: INTERNAL MEDICINE

## 2019-09-30 PROCEDURE — 3008F PR BODY MASS INDEX (BMI) DOCUMENTED: ICD-10-PCS | Mod: S$GLB,,, | Performed by: INTERNAL MEDICINE

## 2019-09-30 PROCEDURE — 3008F BODY MASS INDEX DOCD: CPT | Mod: S$GLB,,, | Performed by: ORTHOPAEDIC SURGERY

## 2019-09-30 PROCEDURE — 99213 OFFICE O/P EST LOW 20 MIN: CPT | Mod: S$GLB,,, | Performed by: ORTHOPAEDIC SURGERY

## 2019-09-30 PROCEDURE — 99213 PR OFFICE/OUTPT VISIT, EST, LEVL III, 20-29 MIN: ICD-10-PCS | Mod: S$GLB,,, | Performed by: ORTHOPAEDIC SURGERY

## 2019-09-30 PROCEDURE — 3008F PR BODY MASS INDEX (BMI) DOCUMENTED: ICD-10-PCS | Mod: S$GLB,,, | Performed by: ORTHOPAEDIC SURGERY

## 2019-09-30 PROCEDURE — 99213 PR OFFICE/OUTPT VISIT, EST, LEVL III, 20-29 MIN: ICD-10-PCS | Mod: S$GLB,,, | Performed by: INTERNAL MEDICINE

## 2019-09-30 PROCEDURE — 3008F BODY MASS INDEX DOCD: CPT | Mod: S$GLB,,, | Performed by: INTERNAL MEDICINE

## 2019-09-30 RX ORDER — CYCLOBENZAPRINE HCL 10 MG
10 TABLET ORAL 3 TIMES DAILY PRN
Refills: 1 | COMMUNITY
Start: 2019-08-21 | End: 2019-11-11 | Stop reason: SDUPTHER

## 2019-09-30 NOTE — TELEPHONE ENCOUNTER
The following medication needs a prior authorization:     Medication Name: linzess     Dosage: 145mg    Frequency: 1 tab daily     Directions for use: 1 tab daily     Diagnosis: irritable bowel syndrome with constipation      Is the request for a reauthorization? no    Is the patient currently stable on therapy? no    Please list all therapeutic alternatives previously used with start/end dates and outcome:

## 2019-09-30 NOTE — PROGRESS NOTES
Subjective:       Patient ID: Saima Winkler is a 49 y.o. female.    Chief Complaint: Constipation    Miss Landaverde is a 49-year-old  female who comes for follow-up.  Underlying medical issues of chronic low back pain, discogenic disease and recent diagnosis of piriformis syndrome have been noted.  She is following up with Dr. Juárez, chronic pain management. Trigger Point injections. Steroid Injections    In the interim she also gets periodically constipated.  In fact her usual bowel movements would be no more than once a week.  She was given a trial of Linzess 72 mcg because of availability.  She found relief with 145 mcg.  While this does not dissipate her back pain, it does improve her tolerability of back pain.    Constipation   This is a chronic problem. The current episode started more than 1 year ago. The problem has been waxing and waning since onset. Her stool frequency is 1 time per week or less. The stool is described as blood coated. She does not exercise regularly (restricted by Back pain). Associated symptoms include abdominal pain and back pain. Pertinent negatives include no difficulty urinating, fecal incontinence, fever, nausea or vomiting. Risk factors include stress. She has tried stool softeners and laxatives (MOM cramping) for the symptoms. The treatment provided mild relief.       Past Medical History:   Diagnosis Date    Anxiety     Depression      Social History     Socioeconomic History    Marital status:      Spouse name: Not on file    Number of children: 3    Years of education: Not on file    Highest education level: Not on file   Occupational History    Occupation: Registered Nurse      Employer: [employer]     Comment: Cancer Center   Social Needs    Financial resource strain: Not on file    Food insecurity:     Worry: Not on file     Inability: Not on file    Transportation needs:     Medical: Not on file     Non-medical: Not on file   Tobacco  Use    Smoking status: Former Smoker     Last attempt to quit: 2007     Years since quittin.7    Smokeless tobacco: Never Used   Substance and Sexual Activity    Alcohol use: Yes    Drug use: No    Sexual activity: Yes     Partners: Male   Lifestyle    Physical activity:     Days per week: Not on file     Minutes per session: Not on file    Stress: Very much   Relationships    Social connections:     Talks on phone: Not on file     Gets together: Not on file     Attends Orthodox service: Not on file     Active member of club or organization: Not on file     Attends meetings of clubs or organizations: Not on file     Relationship status: Not on file   Other Topics Concern    Not on file   Social History Narrative    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     Past Surgical History:   Procedure Laterality Date    HYSTERECTOMY      KNEE SURGERY      SHOULDER SURGERY      TRANSFORAMINAL EPIDURAL INJECTION OF STEROID Bilateral 2019    Procedure: Injection,steroid,epidural,transforaminal approach L4-5;  Surgeon: Jeyson Kent MD;  [facility];  Service: Pain Management;  Laterality: Bilateral;    TRANSFORAMINAL EPIDURAL INJECTION OF STEROID Bilateral 2019    Procedure: Injection,steroid,epidural,transforaminal approach;  Surgeon: Jeyson Kent MD;  [facility];  Service: Pain Management;  Laterality: Bilateral;  L4-5    vaginal sling       Family History   Problem Relation Age of Onset    Asthma Mother     Depression Mother     Hypertension Mother     Heart disease Father     Depression Father     Hypertension Father        Review of Systems   Constitutional: Negative for activity change, appetite change, fever and unexpected weight change (Intentional weight loss on Adipex-P.).   HENT: Negative for congestion, ear discharge, ear pain, sore throat, trouble swallowing and voice change.    Eyes: Negative for photophobia, pain, discharge and visual  "disturbance.   Respiratory: Negative for cough, chest tightness and shortness of breath.    Cardiovascular: Negative for chest pain and palpitations.   Gastrointestinal: Positive for abdominal pain and constipation. Negative for nausea and vomiting.   Endocrine: Negative for cold intolerance and heat intolerance.   Genitourinary: Negative for difficulty urinating, dysuria and pelvic pain.   Musculoskeletal: Positive for arthralgias, back pain and myalgias. Negative for gait problem.        Recent diagnosis of piriformis syndrome.  Going through pain management with Dr. Juárez   Skin: Negative for rash.   Allergic/Immunologic: Negative for immunocompromised state.   Neurological: Negative for speech difficulty, weakness, numbness and headaches.   Psychiatric/Behavioral: Negative for agitation, confusion, self-injury and suicidal ideas.         Objective:      Blood pressure 120/85, pulse 81, height 5' 7" (1.702 m), weight 70.8 kg (156 lb). Body mass index is 24.43 kg/m².  Physical Exam   Constitutional: She appears well-developed and well-nourished. She is cooperative. No distress.   HENT:   Head: Normocephalic and atraumatic.   Eyes: Pupils are equal, round, and reactive to light. Conjunctivae and lids are normal. Lids are everted and swept, no foreign bodies found. Right pupil is round and reactive. Left pupil is round and reactive.   Neck: Trachea normal and normal range of motion. Neck supple.       Right neck scar secondary to anterior cervical disc fusion   Cardiovascular: Normal rate, regular rhythm, S1 normal, S2 normal and normal heart sounds.   Pulmonary/Chest: Breath sounds normal.   Abdominal: Soft. Bowel sounds are normal. There is no rigidity and no guarding.   Musculoskeletal: She exhibits no tenderness or deformity.        Lumbar back: She exhibits normal range of motion.   Neurological: She is alert.   Skin: Skin is warm and dry.   Psychiatric: Her behavior is normal.   Somewhat anhedonic mood. "   Nursing note and vitals reviewed.        Assessment:       1. Irritable bowel syndrome with constipation           Office Visit on 08/19/2019   Component Date Value Ref Range Status    WBC 08/19/2019 5.3  3.8 - 10.8 Thousand/uL Final    RBC 08/19/2019 4.08  3.80 - 5.10 Million/uL Final    Hemoglobin 08/19/2019 12.4  11.7 - 15.5 g/dL Final    Hematocrit 08/19/2019 37.6  35.0 - 45.0 % Final    Mean Corpuscular Volume 08/19/2019 92.2  80.0 - 100.0 fL Final    Mean Corpuscular Hemoglobin 08/19/2019 30.4  27.0 - 33.0 pg Final    Mean Corpuscular Hemoglobin Conc 08/19/2019 33.0  32.0 - 36.0 g/dL Final    RDW 08/19/2019 11.8  11.0 - 15.0 % Final    Platelets 08/19/2019 268  140 - 400 Thousand/uL Final    MPV 08/19/2019 9.9  7.5 - 12.5 fL Final    Neutrophils Absolute 08/19/2019 3,048  1,500 - 7,800 cells/uL Final    Lymph # 08/19/2019 1,664  850 - 3,900 cells/uL Final    Mono # 08/19/2019 472  200 - 950 cells/uL Final    Eos # 08/19/2019 69  15 - 500 cells/uL Final    Baso # 08/19/2019 48  0 - 200 cells/uL Final    Neutrophils Relative 08/19/2019 57.5  % Final    Lymph% 08/19/2019 31.4  % Final    Mono% 08/19/2019 8.9  % Final    Eosinophil% 08/19/2019 1.3  % Final    Basophil% 08/19/2019 0.9  % Final    Glucose 08/19/2019 102  65 - 139 mg/dL Final    BUN, Bld 08/19/2019 12  7 - 25 mg/dL Final    Creatinine 08/19/2019 0.84  0.50 - 1.10 mg/dL Final    eGFR if non African American 08/19/2019 82  > OR = 60 mL/min/1.73m2 Final    eGFR if African American 08/19/2019 95  > OR = 60 mL/min/1.73m2 Final    BUN/Creatinine Ratio 08/19/2019 NOT APPLICABLE  6 - 22 (calc) Final    Sodium 08/19/2019 139  135 - 146 mmol/L Final    Potassium 08/19/2019 4.3  3.5 - 5.3 mmol/L Final    Chloride 08/19/2019 103  98 - 110 mmol/L Final    CO2 08/19/2019 29  20 - 32 mmol/L Final    Calcium 08/19/2019 9.2  8.6 - 10.2 mg/dL Final    Total Protein 08/19/2019 6.6  6.1 - 8.1 g/dL Final    Albumin 08/19/2019 4.3  3.6 -  5.1 g/dL Final    Globulin, Total 08/19/2019 2.3  1.9 - 3.7 g/dL (calc) Final    Albumin/Globulin Ratio 08/19/2019 1.9  1.0 - 2.5 (calc) Final    Total Bilirubin 08/19/2019 0.4  0.2 - 1.2 mg/dL Final    Alkaline Phosphatase 08/19/2019 37  33 - 115 U/L Final    AST 08/19/2019 23  10 - 35 U/L Final    ALT 08/19/2019 19  6 - 29 U/L Final    CRP 08/19/2019 1.5  <8.0 mg/L Final    Rheumatoid Factor 08/19/2019 <14  <14 IU/mL Final    Cyclic Citrullinated Peptide (CCP)* 08/19/2019 <16  UNITS Final    DARRELL 08/19/2019 NEGATIVE  NEGATIVE Final    Sed Rate 08/19/2019 2  < OR = 20 mm/h Final         Plan:           Irritable bowel syndrome with constipation      Patient has tried over-the-counter supplements but with side effects.  At this point IBS with constipation seems to be also affecting her low back pain. I have given her some samples of Linzess 72 mcg and at 145 mcg and she seems to be finding some relief.  I do believe that with relief of constipation and having a regular bowel movement will make a tangible and significant difference in or overall sense of well-being.  It will obviously not completely dissipate the back pain  but probably it will improve her mood and ability to tolerate some degree of pain.    I have given her approximately 4 boxes of Linzess again which will last probably for 8 days.  Will need to get a PA for 145 mcg Linzess.    Since patient has been diagnosed with piriformis syndrome also, she has been advised to continue with physical therapy at least on a couple of occasions.  She should also probably take her friend partner with her on 1 occasion so that he can also look into the type of massaging or manipulation that can help her.    I will see her back in 4 months to check on her progress from current symptomatology.  Earlier if needed.     Patient examined in presence of chaperone.      Current Outpatient Medications:     celecoxib (CELEBREX) 100 MG capsule, Take 100 mg by mouth 2  (two) times daily., Disp: , Rfl: 3    cyclobenzaprine (FLEXERIL) 10 MG tablet, Take 10 mg by mouth 3 (three) times daily as needed., Disp: , Rfl: 1    estradiol (ESTRACE) 1 MG tablet, Take 1 tablet (1 mg total) by mouth once daily., Disp: 90 tablet, Rfl: 1    linaCLOtide (LINZESS) 145 mcg Cap capsule, Take 1 capsule (145 mcg total) by mouth once daily., Disp: 30 capsule, Rfl: 5    linaCLOtide (LINZESS) 72 mcg Cap capsule, Take 1 capsule (72 mcg total) by mouth once daily., Disp: 16 capsule, Rfl: 0    orphenadrine (NORFLEX) 100 mg tablet, Take 1 tablet (100 mg total) by mouth once daily., Disp: 30 tablet, Rfl: 5    ADIPEX-P 37.5 mg tablet, TK 1 T PO QAM, Disp: , Rfl: 0    gabapentin (NEURONTIN) 300 MG capsule, Take 3 capsules (900 mg total) by mouth every evening. (Patient not taking: Reported on 9/30/2019), Disp: 90 capsule, Rfl: 2

## 2019-09-30 NOTE — PROGRESS NOTES
Subjective:       Patient ID: Saima Winkler is a 49 y.o. female.    Chief Complaint: Pain of the Lumbar Spine (She did see Dr Juárez for  injections Wednesday. Injection helped left hip pain but the lumbar is hurting again today.)      History of Present Illness  Follow-up for chronic low back pain. She does have chronic leg pain and has known degenerative disc disease and facet joint arthritis at L4-5. She saw Dr. Juárez to did a new injection. For best syndrome and she did have some temporary relief of a portion of her pain    Current Medications  Current Outpatient Medications   Medication Sig Dispense Refill    ADIPEX-P 37.5 mg tablet TK 1 T PO QAM  0    celecoxib (CELEBREX) 100 MG capsule Take 100 mg by mouth 2 (two) times daily.  3    cyclobenzaprine (FLEXERIL) 10 MG tablet Take 10 mg by mouth 3 (three) times daily as needed.  1    estradiol (ESTRACE) 1 MG tablet Take 1 tablet (1 mg total) by mouth once daily. 90 tablet 1    gabapentin (NEURONTIN) 300 MG capsule Take 3 capsules (900 mg total) by mouth every evening. 90 capsule 2    linaCLOtide (LINZESS) 72 mcg Cap capsule Take 1 capsule (72 mcg total) by mouth once daily. 16 capsule 0    linaCLOtide (LINZESS) 145 mcg Cap capsule Take 1 capsule (145 mcg total) by mouth once daily. 30 capsule 5    orphenadrine (NORFLEX) 100 mg tablet Take 1 tablet (100 mg total) by mouth once daily. 30 tablet 5     No current facility-administered medications for this visit.        Allergies  Review of patient's allergies indicates:  No Known Allergies    Past Medical History  Past Medical History:   Diagnosis Date    Anxiety     Depression        Surgical History  Past Surgical History:   Procedure Laterality Date    HYSTERECTOMY      KNEE SURGERY      SHOULDER SURGERY      TRANSFORAMINAL EPIDURAL INJECTION OF STEROID Bilateral 6/4/2019    Procedure: Injection,steroid,epidural,transforaminal approach L4-5;  Surgeon: Jeyson Kent MD;  [facility];  Service:  Pain Management;  Laterality: Bilateral;    TRANSFORAMINAL EPIDURAL INJECTION OF STEROID Bilateral 2019    Procedure: Injection,steroid,epidural,transforaminal approach;  Surgeon: Jeyson Kent MD;  Location: [facility];  Service: Pain Management;  Laterality: Bilateral;  L4-5    vaginal sling         Family History:   Family History   Problem Relation Age of Onset    Asthma Mother     Depression Mother     Hypertension Mother     Heart disease Father     Depression Father     Hypertension Father        Social History:   Social History     Socioeconomic History    Marital status:      Spouse name: Not on file    Number of children: 3    Years of education: Not on file    Highest education level: Not on file   Occupational History    Occupation: Registered Nurse      Employer: [employer]     Comment: [employer]   Social Needs    Financial resource strain: Not on file    Food insecurity:     Worry: Not on file     Inability: Not on file    Transportation needs:     Medical: Not on file     Non-medical: Not on file   Tobacco Use    Smoking status: Former Smoker     Last attempt to quit: 2007     Years since quittin.7    Smokeless tobacco: Never Used   Substance and Sexual Activity    Alcohol use: Yes    Drug use: No    Sexual activity: Yes     Partners: Male   Lifestyle    Physical activity:     Days per week: Not on file     Minutes per session: Not on file    Stress: Very much   Relationships    Social connections:     Talks on phone: Not on file     Gets together: Not on file     Attends Congregation service: Not on file     Active member of club or organization: Not on file     Attends meetings of clubs or organizations: Not on file     Relationship status: Not on file   Other Topics Concern    Not on file   Social History Narrative    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       Hospitalization/Major Diagnostic Procedure:     Review of  Systems     General/Constitutional:  Chills denies. Fatigue denies. Fever denies. Weight gain denies. Weight loss denies.    Respiratory:  Shortness of breath denies.    Cardiovascular:  Chest pain denies.    Gastrointestinal:  Constipation denies. Diarrhea denies. Nausea denies. Vomiting denies.     Hematology:  Easy bruising denies. Prolonged bleeding denies.     Genitourinary:  Frequent urination denies. Pain in lower back denies. Painful urination denies.     Musculoskeletal:  See HPI for details    Skin:  Rash denies.    Neurologic:  Dizziness denies. Gait abnormalities denies. Seizures denies. Tingling/Numbess denies.    Psychiatric:  Anxiety denies. Depressed mood denies.     Objective:   Vital Signs:   Vitals:    09/30/19 0952   BP: 124/80   Pulse: 85        Physical Exam      General Examination:     Constitutional: The patient is alert and oriented to lace person and time. Mood is pleasant.     Head/Face: Normal facial features normal eyebrows    Eyes: Normal extraocular motion bilaterally    Lungs: Respirations are equal and unlabored    Gait is coordinated.    Cardiovascular: There are no swelling or varicosities present.    Lymphatic: Negative for adenopathy    Skin: Normal    Neurological: Level of consciousness normal. Oriented to place person and time and situation    Psychiatric: Oriented to time place person and situation    Lumbar exam shows patient can stand erect this tenderness paraspinous muscles L4 S1 no spasm left side greater right range of motion moderately restricted. Hip range of motion normal straight leg raising causes back pain  XRAY Report/ Interpretation:       Assessment:       1. Piriformis syndrome, unspecified laterality    2. Disc degeneration, lumbar    3. Bulging of lumbar intervertebral disc        Plan:       Saima was seen today for pain.    Diagnoses and all orders for this visit:    Piriformis syndrome, unspecified laterality    Disc degeneration, lumbar    Bulging of  lumbar intervertebral disc         No follow-ups on file.    Her pain is on the basis of degenerative disc disease at L4-5 and facet joint arthritis and also possibly new diagnosis of piriformis syndrome. She is a nurse try to avoid surgery. I advised that she continue treatment with Dr. Juárez to see how much relief she can get with this former treatment before we address the possibility of surgery to address the L4-5 spondylosis degenerative disc these and facet arthritis which would require an anterior lumbar interbody fusion at L4-5 conservative measures and only consider surgery as a last resort standing flexion-extension lateral x-rays lumbar spine advised  Visit    This note was created using Dragon voice recognition software that occasionally misinterpreted phrases or words.

## 2019-09-30 NOTE — PATIENT INSTRUCTIONS
Treating Constipation    Constipation is a common and often uncomfortable problem. Constipation means you have bowel movements fewer than 3 times per week, or strain to pass hard, dry stool. It can last a short time. Or it can be a problem that never seems to go away. The good news is that it can often be treated and controlled.  Eat more fiber  One of the best ways to help treat constipation is to increase your fiber intake. You can do this either through diet or by using fiber supplements. Fiber (in whole grains, fruits, and vegetables) adds bulk and absorbs water to soften the stool. This helps the stool pass through the colon more easily. When you increase your fiber intake, do it slowly to avoid side effects such as bloating. Also increase the amount of water that you drink. Eating more of the following foods can add fiber to your diet.  · High-fiber cereals  · Whole grains, bran, and brown rice  · Vegetables such as carrots, broccoli, and greens  · Fresh fruits (especially apples, pears, and dried fruits like raisins and apricots)  · Nuts and legumes (especially beans such as lentils, kidney beans, and lima beans)  Get physically active  Exercise helps improve the working of your colon which helps ease constipation. Try to get some physical activity every day. If you havent been active for a while, talk to your healthcare provider before starting again.  Laxatives  Your healthcare provider may suggest an over-the-counter product to help ease your constipation. He or she may suggest the use of bulk-forming agents or laxatives. The use of laxatives, if used as directed, is common and safe. Follow directions carefully when using them. See your healthcare provider for new-onset constipation, or long-term constipation, to rule out other causes such as medicines or thyroid disease.  Date Last Reviewed: 7/1/2016  © 4264-2548 [employer]. [address] [place] [address], [place] [address]. All rights reserved.  This information is not intended as a substitute for professional medical care. Always follow your healthcare professional's instructions.

## 2019-11-10 DIAGNOSIS — M54.16 LUMBAR RADICULOPATHY: ICD-10-CM

## 2019-11-10 DIAGNOSIS — M51.36 DISC DEGENERATION, LUMBAR: ICD-10-CM

## 2019-11-10 DIAGNOSIS — M48.061 FORAMINAL STENOSIS OF LUMBAR REGION: ICD-10-CM

## 2019-11-10 DIAGNOSIS — M51.36 BULGING OF LUMBAR INTERVERTEBRAL DISC: ICD-10-CM

## 2019-11-10 RX ORDER — GABAPENTIN 300 MG/1
900 CAPSULE ORAL NIGHTLY
Qty: 270 CAPSULE | Refills: 0 | Status: SHIPPED | OUTPATIENT
Start: 2019-11-10 | End: 2020-06-24

## 2019-11-11 ENCOUNTER — PATIENT MESSAGE (OUTPATIENT)
Dept: ORTHOPEDICS | Facility: CLINIC | Age: 49
End: 2019-11-11

## 2019-11-11 RX ORDER — CYCLOBENZAPRINE HCL 10 MG
10 TABLET ORAL 3 TIMES DAILY PRN
Qty: 90 TABLET | Refills: 2 | Status: SHIPPED | OUTPATIENT
Start: 2019-11-11 | End: 2019-12-11

## 2019-12-02 ENCOUNTER — OFFICE VISIT (OUTPATIENT)
Dept: ORTHOPEDICS | Facility: CLINIC | Age: 49
End: 2019-12-02
Payer: COMMERCIAL

## 2019-12-02 VITALS — BODY MASS INDEX: 25.06 KG/M2 | DIASTOLIC BLOOD PRESSURE: 80 MMHG | WEIGHT: 160 LBS | SYSTOLIC BLOOD PRESSURE: 108 MMHG

## 2019-12-02 DIAGNOSIS — M53.3 SACROILIAC JOINT DYSFUNCTION OF LEFT SIDE: ICD-10-CM

## 2019-12-02 DIAGNOSIS — M51.36 DISC DEGENERATION, LUMBAR: Primary | ICD-10-CM

## 2019-12-02 PROCEDURE — 3008F PR BODY MASS INDEX (BMI) DOCUMENTED: ICD-10-PCS | Mod: S$GLB,,, | Performed by: ORTHOPAEDIC SURGERY

## 2019-12-02 PROCEDURE — 99213 OFFICE O/P EST LOW 20 MIN: CPT | Mod: 25,S$GLB,, | Performed by: ORTHOPAEDIC SURGERY

## 2019-12-02 PROCEDURE — 99213 PR OFFICE/OUTPT VISIT, EST, LEVL III, 20-29 MIN: ICD-10-PCS | Mod: 25,S$GLB,, | Performed by: ORTHOPAEDIC SURGERY

## 2019-12-02 PROCEDURE — 3008F BODY MASS INDEX DOCD: CPT | Mod: S$GLB,,, | Performed by: ORTHOPAEDIC SURGERY

## 2019-12-02 NOTE — PROGRESS NOTES
Subjective:       Patient ID: Saima Winkler is a 49 y.o. female.    Chief Complaint: Pain of the Lumbar Spine (Lumbar is better some. Pain radiates down right buttock with numbness in both feet. 2 weeks ago she had MBB by Dr Juárez. She will see him again Thursday.She has concerns about left hip pain with clicking)      History of Present Illness    Follow-up for chronic low back pain. She does have chronic leg pain and has known degenerative disc disease and facet joint arthritis at L4-5. She saw Dr. Juárez to did a new injection. For best syndrome and she did have some temporary relief of a portion of her pain we did believe that she had pain caused by both piriformis syndrome and symptomatic degenerative disc disease with facet arthritis at L4-5.  She had a left medial branch block and did give her some partial relief of her pain though she noted significant pain in the buttock area.    Current Medications  Current Outpatient Medications   Medication Sig Dispense Refill    ADIPEX-P 37.5 mg tablet TK 1 T PO QAM  0    celecoxib (CELEBREX) 100 MG capsule Take 100 mg by mouth 2 (two) times daily.  3    cyclobenzaprine (FLEXERIL) 10 MG tablet Take 1 tablet (10 mg total) by mouth 3 (three) times daily as needed. 90 tablet 2    estradiol (ESTRACE) 1 MG tablet Take 1 tablet (1 mg total) by mouth once daily. 90 tablet 1    gabapentin (NEURONTIN) 300 MG capsule TAKE 3 CAPSULES (900 MG TOTAL) BY MOUTH EVERY EVENING. 270 capsule 0    linaCLOtide (LINZESS) 145 mcg Cap capsule Take 1 capsule (145 mcg total) by mouth once daily. 30 capsule 5    orphenadrine (NORFLEX) 100 mg tablet Take 1 tablet (100 mg total) by mouth once daily. 30 tablet 5    linaCLOtide (LINZESS) 72 mcg Cap capsule Take 1 capsule (72 mcg total) by mouth once daily. 16 capsule 0     No current facility-administered medications for this visit.        Allergies  Review of patient's allergies indicates:  No Known Allergies    Past Medical History  Past  Medical History:   Diagnosis Date    Anxiety     Depression        Surgical History  Past Surgical History:   Procedure Laterality Date    HYSTERECTOMY      KNEE SURGERY      SHOULDER SURGERY      TRANSFORAMINAL EPIDURAL INJECTION OF STEROID Bilateral 2019    Procedure: Injection,steroid,epidural,transforaminal approach L4-5;  Surgeon: Jeyson Kent MD;  Location: [facility] OR;  Service: Pain Management;  Laterality: Bilateral;    TRANSFORAMINAL EPIDURAL INJECTION OF STEROID Bilateral 2019    Procedure: Injection,steroid,epidural,transforaminal approach;  Surgeon: Jeyson Kent MD;  Location: [facility] OR;  Service: Pain Management;  Laterality: Bilateral;  L4-5    vaginal sling         Family History:   Family History   Problem Relation Age of Onset    Asthma Mother     Depression Mother     Hypertension Mother     Heart disease Father     Depression Father     Hypertension Father        Social History:   Social History     Socioeconomic History    Marital status:      Spouse name: Not on file    Number of children: 3    Years of education: Not on file    Highest education level: Not on file   Occupational History    Occupation: Registered Nurse      Employer: [employer]     Comment: Cancer Center   Social Needs    Financial resource strain: Not on file    Food insecurity:     Worry: Not on file     Inability: Not on file    Transportation needs:     Medical: Not on file     Non-medical: Not on file   Tobacco Use    Smoking status: Former Smoker     Last attempt to quit: 2007     Years since quittin.9    Smokeless tobacco: Never Used   Substance and Sexual Activity    Alcohol use: Yes    Drug use: No    Sexual activity: Yes     Partners: Male   Lifestyle    Physical activity:     Days per week: Not on file     Minutes per session: Not on file    Stress: Very much   Relationships    Social connections:     Talks on phone: Not on file     Gets together: Not on  file     Attends [employer] service: Not on file     Active member of club or organization: Not on file     Attends meetings of clubs or organizations: Not on file     Relationship status: Not on file   Other Topics Concern    Not on file   Social History Narrative    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       Hospitalization/Major Diagnostic Procedure:     Review of Systems     General/Constitutional:  Chills denies. Fatigue denies. Fever denies. Weight gain denies. Weight loss denies.    Respiratory:  Shortness of breath denies.    Cardiovascular:  Chest pain denies.    Gastrointestinal:  Constipation denies. Diarrhea denies. Nausea denies. Vomiting denies.     Hematology:  Easy bruising denies. Prolonged bleeding denies.     Genitourinary:  Frequent urination denies. Pain in lower back denies. Painful urination denies.     Musculoskeletal:  See HPI for details    Skin:  Rash denies.    Neurologic:  Dizziness denies. Gait abnormalities denies. Seizures denies. Tingling/Numbess denies.    Psychiatric:  Anxiety denies. Depressed mood denies.     Objective:   Vital Signs:   Vitals:    12/02/19 1033   BP: 108/80        Physical Exam      General Examination:     Constitutional: The patient is alert and oriented to lace person and time. Mood is pleasant.     Head/Face: Normal facial features normal eyebrows    Eyes: Normal extraocular motion bilaterally    Lungs: Respirations are equal and unlabored    Gait is coordinated.    Cardiovascular: There are no swelling or varicosities present.    Lymphatic: Negative for adenopathy    Skin: Normal    Neurological: Level of consciousness normal. Oriented to place person and time and situation    Psychiatric: Oriented to time place person and situation    Exam shows moderate tenderness left posterior iliac spine and midline lumbosacral junction without spasm range of motion slightly limited.  JESSICA test positive on the left.  Sacroiliac joint provocation  test positive left side. Straight-leg-raising negative.  XRAY Report/ Interpretation:  Flexion-extension lateral x-rays lumbar spine were taken today. They showed no translation.  Moderate narrowing L4-5 disc space demonstrated.  AP pelvis x-ray was taken today. Indications low back pain and/or hip pain. Findings AP pelvis x-ray appears to be normal with no evidence of fractures or significant degenerative disease      Assessment:       1. Disc degeneration, lumbar    2. Sacroiliac joint dysfunction of left side        Plan:       Saima was seen today for pain.    Diagnoses and all orders for this visit:    Disc degeneration, lumbar  -     X-Ray Lumbar Spine Flexion And Extension Only    Sacroiliac joint dysfunction of left side  -     X-Ray Pelvis Routine AP         Follow up in about 6 weeks (around 1/13/2020).    She should proceed with follow-up of of medial branch blocks on the left side at least some her pain is emanating from symptomatic facet joint disease.  Additionally I believe she has symptomatic sacroiliac joint dysfunction on the left side and the pain management doctor may need to investigate her sacroiliac joint on the left.  She will wish to have conservative treatment nonsurgical.    This note was created using Dragon voice recognition software that occasionally misinterpreted phrases or words.

## 2019-12-27 ENCOUNTER — PATIENT MESSAGE (OUTPATIENT)
Dept: ORTHOPEDICS | Facility: CLINIC | Age: 49
End: 2019-12-27

## 2019-12-27 RX ORDER — CELECOXIB 100 MG/1
100 CAPSULE ORAL 2 TIMES DAILY
Qty: 60 CAPSULE | Refills: 4 | Status: SHIPPED | OUTPATIENT
Start: 2019-12-27 | End: 2020-01-23

## 2020-01-23 ENCOUNTER — OFFICE VISIT (OUTPATIENT)
Dept: RHEUMATOLOGY | Facility: CLINIC | Age: 50
End: 2020-01-23
Payer: COMMERCIAL

## 2020-01-23 VITALS
WEIGHT: 162.13 LBS | TEMPERATURE: 98 F | SYSTOLIC BLOOD PRESSURE: 122 MMHG | DIASTOLIC BLOOD PRESSURE: 82 MMHG | BODY MASS INDEX: 25.39 KG/M2

## 2020-01-23 DIAGNOSIS — M79.10 MUSCLE PAIN: Primary | ICD-10-CM

## 2020-01-23 DIAGNOSIS — M19.90 OSTEOARTHRITIS, UNSPECIFIED OSTEOARTHRITIS TYPE, UNSPECIFIED SITE: ICD-10-CM

## 2020-01-23 PROCEDURE — 99203 OFFICE O/P NEW LOW 30 MIN: CPT | Mod: S$GLB,,, | Performed by: INTERNAL MEDICINE

## 2020-01-23 PROCEDURE — 99203 PR OFFICE/OUTPT VISIT, NEW, LEVL III, 30-44 MIN: ICD-10-PCS | Mod: S$GLB,,, | Performed by: INTERNAL MEDICINE

## 2020-01-23 PROCEDURE — 3008F PR BODY MASS INDEX (BMI) DOCUMENTED: ICD-10-PCS | Mod: S$GLB,,, | Performed by: INTERNAL MEDICINE

## 2020-01-23 PROCEDURE — 3008F BODY MASS INDEX DOCD: CPT | Mod: S$GLB,,, | Performed by: INTERNAL MEDICINE

## 2020-01-23 RX ORDER — DULOXETIN HYDROCHLORIDE 30 MG/1
CAPSULE, DELAYED RELEASE ORAL
Qty: 30 CAPSULE | Refills: 2 | Status: SHIPPED | OUTPATIENT
Start: 2020-01-23 | End: 2020-02-17 | Stop reason: SDUPTHER

## 2020-01-23 RX ORDER — DIFLUNISAL 500 MG/1
TABLET, FILM COATED ORAL
Qty: 90 TABLET | Refills: 0 | Status: SHIPPED | OUTPATIENT
Start: 2020-01-23 | End: 2020-06-24

## 2020-01-23 NOTE — LETTER
January 23, 2020      Gerardo Buchanan MD  [address]  [address]  [place] [address]           [facility] - Rheumatology  [address]  [address]  [place] [address]  Phone: [phone]  Fax: [phone]          Patient: Saima Winkler   MR Number: [id number]   YOB: 1970   Date of Visit: 1/23/2020       Dear Dr. Gerardo Buchanan:    Thank you for referring Saima Winkler to me for evaluation. Attached you will find relevant portions of my assessment and plan of care.    If you have questions, please do not hesitate to call me. I look forward to following Saima Winkler along with you.    Sincerely,    Brad Hung MD    Enclosure  CC:  No Recipients    If you would like to receive this communication electronically, please contact [email or web address] or [phone] to request more information on [employer] access.    For providers and/or their staff who would like to refer a patient to [facility], please contact us through our one-stop-shop provider referral line, [facility], at [phone].    If you feel you have received this communication in error or would no longer like to receive these types of communications, please e-mail [email or web address]

## 2020-01-23 NOTE — PROGRESS NOTES
[facility] RHEUMATOLOGY        NEW PATIENT      Subjective:       Patient ID:   NAME: Saima Winkler : 1970     49 y.o. female    Referring Doc: Gerardo Buchanan MD  Other Physicians:    Chief Complaint:  Total body pain      History of Present Illness:     New patient with hx of diffuse arthralgias and myalgias starting 20 yrs ago.Worsening for last year. Sl swelling PIP's,DIP's  Fatigue ( insomnia and interrupted sleep)  AM stiffness 10-mins to 1 hr  + hx of Raynaud's for yrs  No dysphagia.No photosensitivity  History of neck and low back pain.  No prolonged stiffness in her low back.  Low back pain relieved with rest and worsens with activity  No CP,cough or SOB  ROS:   GEN: no fevers night sweats or significant weight changes  +  fatigue  HEENT: + HA's from neck to occiput since neck surgery , changes in vision , no mouth ulcers, + sicca symptoms ( worse for last 2 yrs, no scalp tenderness, jaw claudication  CV: no CP, SOB, PND, LESTER or orthopnea,no palpitations  PULM:no SOB, cough, hemoptysis, sputum or pleuritic pain  GI: no abdominal pain, nausea, vomiting,+ constipation, diarrhea, melanotic stools, BRBPR, or hematemesis, no dysphagia  : no hematuria, dysuria  NEURO:no paresthesias, headaches, visual disturbances, muscle weakness  SKIN:  no rashes , erythema, bruising, or swelling, + Raynauds, no photosensitivity  MUSCULOSKELETAL:no joint swelling, + prolonged AM stiffness, + back pain worse with activity,better with rest  Has had epidural injections  PSYCH:    Insomnia,   + hx of depression( ok at present),  + history anxiety ( ok at present)    Medications:    Current Outpatient Medications:     ADIPEX-P 37.5 mg tablet, TK 1 T PO QAM, Disp: , Rfl: 0    celecoxib (CELEBREX) 100 MG capsule, Take 100 mg by mouth 2 (two) times daily., Disp: , Rfl: 3    celecoxib (CELEBREX) 100 MG capsule, Take 1 capsule (100 mg total) by mouth 2 (two) times daily., Disp: 60 capsule, Rfl: 4    estradiol  (ESTRACE) 1 MG tablet, Take 1 tablet (1 mg total) by mouth once daily., Disp: 90 tablet, Rfl: 1    gabapentin (NEURONTIN) 300 MG capsule, TAKE 3 CAPSULES (900 MG TOTAL) BY MOUTH EVERY EVENING., Disp: 270 capsule, Rfl: 0    linaCLOtide (LINZESS) 145 mcg Cap capsule, Take 1 capsule (145 mcg total) by mouth once daily., Disp: 30 capsule, Rfl: 5    linaCLOtide (LINZESS) 72 mcg Cap capsule, Take 1 capsule (72 mcg total) by mouth once daily., Disp: 16 capsule, Rfl: 0    orphenadrine (NORFLEX) 100 mg tablet, Take 1 tablet (100 mg total) by mouth once daily., Disp: 30 tablet, Rfl: 5  FAMILY HISTORY: negative for Connective Tissue Disease  Sister : psoriasis/Psoriatic Arthritis  PAST MEDICAL HISTORY:  Irritable Bowel Disease with constipation  PAST SURGICAL HISTORY:  R knee,r shoulder surgeries ( arthroscopic surgery )  C spine herniated disc surgery  SOCIAL HISTORY:  Nurse ,works in cancer center  Smoke until 2006  ETOH occ  ALLERGIES:    NKDA      Objective:     Vitals:  Blood pressure 122/82, temperature 98.1 °F (36.7 °C), weight 73.5 kg (162 lb 1.6 oz).    Physical Examination:   GEN: no apparent distress, comfortable; AAOx3  SKIN: no rashes, no lesions, no sclerodactyly or induration, no Raynaud's, no periungual erythema  HEAD: normal  EYES: no pallor, no icterus, PERRLA  ENT:  no thrush,+ sl  mucosal dryness or ulcerations  NECK: no masses, thyroid normal, trachea midline, no LAD/LN's, supple  CV:   S1 and S2 regular, no murmurs, gallop or rubs  CHEST: Normal respiratory effort;  normal breath sounds; no rubs, no wheezes, no crackles.   ABDOM: nontender and nondistended; soft; ; no rebound/guarding,no masses  MUSC/Skeletal: ROM normal; no crepitus; joints without synovitis, no deformities .  Slight tenderness 1st carpometacarpal joints and some  in 1st MTP joint.  Widespread trigger points  EXTREM: no clubbing, cyanosis, edema, normal pulses.  NEURO: grossly intact; motorWNL; AAOx3; no tremors  PSYCH: normal mood,  affect and behavior  LYMPH: normal cervical, supraclavicular            Labs:   @RESUFAST(WBC,HGB,HCT,MCV,PLT)  )@RESUFAST(NA,K,CL,CO2,GLU,BUN,Creatinine,Calcium,PROT,Albumin,Bilitot,Alkphos,AST,ALT,DARRELL,Sed Rate,CRP,RF,CCP)      Radiology/Diagnostic Studies:    I have reviewed all available labs and XRay reports    Assessment/Plan:   49 y.o. female with fatigue myalgias and arthralgias most likely from fibromyalgia but need to rule out hypothyroid state.  History of sicca symptoms and Raynaud's.  Negative DARRELL and negative rheumatoid factor; normal inflammatory markers on workup done 5 months ago.  Will add  Sjogren's serology  Osteoarthritis.  Plan will DC Ibuprofen and Celebrex.  Will try Dolobid 500 mg b.i.d. to t.i.d. p.c. p.r.n..    Labs  FU in 3 wks        Discussion:     I have explained all of the above in detail and the patient understands all of the current recommendation(s). I have answered all of their questions to the best of my ability and to their complete satisfaction.      I have reviewed the risks and benefits of the medication in detail with patient, who understands and wishes to proceed. Printed information regarding the disease and/or medication was also provided.        RTC        Electronically signed by Brad Hung MD

## 2020-01-27 LAB
ALBUMIN SERPL-MCNC: 4.7 G/DL (ref 3.6–5.1)
ALBUMIN/GLOB SERPL: 1.8 (CALC) (ref 1–2.5)
ALP SERPL-CCNC: 46 U/L (ref 33–115)
ALT SERPL-CCNC: 25 U/L (ref 6–29)
APPEARANCE UR: CLEAR
AST SERPL-CCNC: 24 U/L (ref 10–35)
BACTERIA #/AREA URNS HPF: NORMAL /HPF
BASOPHILS # BLD AUTO: 38 CELLS/UL (ref 0–200)
BASOPHILS NFR BLD AUTO: 0.7 %
BILIRUB SERPL-MCNC: 0.3 MG/DL (ref 0.2–1.2)
BILIRUB UR QL STRIP: NEGATIVE
BUN SERPL-MCNC: 23 MG/DL (ref 7–25)
BUN/CREAT SERPL: NORMAL (CALC) (ref 6–22)
CALCIUM SERPL-MCNC: 10 MG/DL (ref 8.6–10.2)
CCP IGG SERPL-ACNC: <16 UNITS
CHLORIDE SERPL-SCNC: 101 MMOL/L (ref 98–110)
CO2 SERPL-SCNC: 31 MMOL/L (ref 20–32)
COLOR UR: YELLOW
CREAT SERPL-MCNC: 0.62 MG/DL (ref 0.5–1.1)
ENA SS-A AB SER IA-ACNC: NORMAL AI
ENA SS-B AB SER IA-ACNC: NORMAL AI
EOSINOPHIL # BLD AUTO: 151 CELLS/UL (ref 15–500)
EOSINOPHIL NFR BLD AUTO: 2.8 %
ERYTHROCYTE [DISTWIDTH] IN BLOOD BY AUTOMATED COUNT: 12.3 % (ref 11–15)
ERYTHROCYTE [SEDIMENTATION RATE] IN BLOOD BY WESTERGREN METHOD: 2 MM/H
GFRSERPLBLD MDRD-ARVRAT: 107 ML/MIN/1.73M2
GLOBULIN SER CALC-MCNC: 2.6 G/DL (CALC) (ref 1.9–3.7)
GLUCOSE SERPL-MCNC: 91 MG/DL (ref 65–139)
GLUCOSE UR QL STRIP: NEGATIVE
HCT VFR BLD AUTO: 39.1 % (ref 35–45)
HGB BLD-MCNC: 13.1 G/DL (ref 11.7–15.5)
HGB UR QL STRIP: NEGATIVE
HYALINE CASTS #/AREA URNS LPF: NORMAL /LPF
KETONES UR QL STRIP: NEGATIVE
LEUKOCYTE ESTERASE UR QL STRIP: NEGATIVE
LYMPHOCYTES # BLD AUTO: 2306 CELLS/UL (ref 850–3900)
LYMPHOCYTES NFR BLD AUTO: 42.7 %
MCH RBC QN AUTO: 30.3 PG (ref 27–33)
MCHC RBC AUTO-ENTMCNC: 33.5 G/DL (ref 32–36)
MCV RBC AUTO: 90.5 FL (ref 80–100)
MONOCYTES # BLD AUTO: 556 CELLS/UL (ref 200–950)
MONOCYTES NFR BLD AUTO: 10.3 %
NEUTROPHILS # BLD AUTO: 2349 CELLS/UL (ref 1500–7800)
NEUTROPHILS NFR BLD AUTO: 43.5 %
NITRITE UR QL STRIP: NEGATIVE
PH UR STRIP: 7 [PH] (ref 5–8)
PLATELET # BLD AUTO: 259 THOUSAND/UL (ref 140–400)
PMV BLD REES-ECKER: 9.6 FL (ref 7.5–12.5)
POTASSIUM SERPL-SCNC: 4.2 MMOL/L (ref 3.5–5.3)
PROT SERPL-MCNC: 7.3 G/DL (ref 6.1–8.1)
PROT UR QL STRIP: NEGATIVE
RBC # BLD AUTO: 4.32 MILLION/UL (ref 3.8–5.1)
RBC #/AREA URNS HPF: NORMAL /HPF
SODIUM SERPL-SCNC: 139 MMOL/L (ref 135–146)
SP GR UR STRIP: 1.01 (ref 1–1.03)
SQUAMOUS #/AREA URNS HPF: NORMAL /HPF
TSH SERPL-ACNC: 0.68 MIU/L
URATE SERPL-MCNC: 4.1 MG/DL (ref 2.5–7)
WBC # BLD AUTO: 5.4 THOUSAND/UL (ref 3.8–10.8)
WBC #/AREA URNS HPF: NORMAL /HPF

## 2020-02-17 RX ORDER — DULOXETIN HYDROCHLORIDE 30 MG/1
CAPSULE, DELAYED RELEASE ORAL
Qty: 60 CAPSULE | Refills: 1 | Status: SHIPPED | OUTPATIENT
Start: 2020-02-17 | End: 2020-06-24

## 2020-02-21 DIAGNOSIS — N95.1 HOT FLASHES DUE TO MENOPAUSE: ICD-10-CM

## 2020-02-21 RX ORDER — ESTRADIOL 1 MG/1
TABLET ORAL
Qty: 90 TABLET | Refills: 1 | Status: SHIPPED | OUTPATIENT
Start: 2020-02-21 | End: 2020-08-23

## 2020-03-09 RX ORDER — SALSALATE 500 MG/1
TABLET, FILM COATED ORAL
Qty: 120 TABLET | Refills: 0 | Status: SHIPPED | OUTPATIENT
Start: 2020-03-09 | End: 2020-06-24

## 2020-03-18 DIAGNOSIS — K58.1 IRRITABLE BOWEL SYNDROME WITH CONSTIPATION: ICD-10-CM

## 2020-03-18 RX ORDER — LINACLOTIDE 145 UG/1
CAPSULE, GELATIN COATED ORAL
Qty: 30 CAPSULE | Refills: 1 | Status: SHIPPED | OUTPATIENT
Start: 2020-03-18 | End: 2020-05-14

## 2020-04-23 DIAGNOSIS — Z01.84 ANTIBODY RESPONSE EXAMINATION: ICD-10-CM

## 2020-04-27 ENCOUNTER — LAB VISIT (OUTPATIENT)
Dept: LAB | Facility: HOSPITAL | Age: 50
End: 2020-04-27
Attending: INTERNAL MEDICINE
Payer: COMMERCIAL

## 2020-04-27 DIAGNOSIS — Z01.84 ANTIBODY RESPONSE EXAMINATION: ICD-10-CM

## 2020-04-27 PROCEDURE — 86769 SARS-COV-2 COVID-19 ANTIBODY: CPT

## 2020-04-27 PROCEDURE — 36415 COLL VENOUS BLD VENIPUNCTURE: CPT

## 2020-04-28 LAB — SARS-COV-2 IGG SERPL QL IA: NEGATIVE

## 2020-05-14 DIAGNOSIS — K58.1 IRRITABLE BOWEL SYNDROME WITH CONSTIPATION: ICD-10-CM

## 2020-05-14 RX ORDER — LINACLOTIDE 145 UG/1
CAPSULE, GELATIN COATED ORAL
Qty: 30 CAPSULE | Refills: 1 | Status: SHIPPED | OUTPATIENT
Start: 2020-05-14 | End: 2020-07-20

## 2020-06-24 ENCOUNTER — OFFICE VISIT (OUTPATIENT)
Dept: FAMILY MEDICINE | Facility: CLINIC | Age: 50
End: 2020-06-24
Payer: COMMERCIAL

## 2020-06-24 VITALS
HEIGHT: 67 IN | OXYGEN SATURATION: 99 % | HEART RATE: 77 BPM | DIASTOLIC BLOOD PRESSURE: 70 MMHG | BODY MASS INDEX: 25.69 KG/M2 | TEMPERATURE: 99 F | SYSTOLIC BLOOD PRESSURE: 110 MMHG | WEIGHT: 163.69 LBS | RESPIRATION RATE: 16 BRPM

## 2020-06-24 DIAGNOSIS — K64.1 GRADE II HEMORRHOIDS: ICD-10-CM

## 2020-06-24 DIAGNOSIS — K62.5 RECTAL BLEEDING: Primary | ICD-10-CM

## 2020-06-24 DIAGNOSIS — K62.89 ANAL OR RECTAL PAIN: ICD-10-CM

## 2020-06-24 PROCEDURE — 99213 OFFICE O/P EST LOW 20 MIN: CPT | Mod: S$GLB,,, | Performed by: INTERNAL MEDICINE

## 2020-06-24 PROCEDURE — 3008F PR BODY MASS INDEX (BMI) DOCUMENTED: ICD-10-PCS | Mod: S$GLB,,, | Performed by: INTERNAL MEDICINE

## 2020-06-24 PROCEDURE — 99213 PR OFFICE/OUTPT VISIT, EST, LEVL III, 20-29 MIN: ICD-10-PCS | Mod: S$GLB,,, | Performed by: INTERNAL MEDICINE

## 2020-06-24 PROCEDURE — 3008F BODY MASS INDEX DOCD: CPT | Mod: S$GLB,,, | Performed by: INTERNAL MEDICINE

## 2020-06-24 RX ORDER — HYDROCORTISONE ACETATE 25 MG/1
25 SUPPOSITORY RECTAL 2 TIMES DAILY
Qty: 20 SUPPOSITORY | Refills: 0 | Status: SHIPPED | OUTPATIENT
Start: 2020-06-24 | End: 2020-07-04

## 2020-06-24 RX ORDER — PHENTERMINE HYDROCHLORIDE 37.5 MG/1
37.5 CAPSULE ORAL EVERY MORNING
COMMUNITY
Start: 2020-05-19 | End: 2022-07-28

## 2020-06-24 NOTE — PROGRESS NOTES
Subjective:       Patient ID: Saima Winkler is a 50 y.o. female.    Chief Complaint: Rectal Bleeding (hurt L knee line dancing, took 800mg Ibuprofen - bleeding started same evening)    Ms cummins is 50 year  female who started noticing fresh rectal bleed today morning when she had a bowel movement.  This was moderately significant and she has not experienced this before.  Probably she does have hemorrhoids as far as she can recall.  No history of diverticulosis or any other lesions.  At the age of 21 or so she had and colonoscopy which was unremarkable.    She does have IBS constipation type and does take Linzess.  She was having some nonspecific pains recently and took 800 mg of ibuprofen.  She is on no other anticoagulation at this point.    She does not have any segment abdominal pain or discomfort.  No fever.  No nausea or vomiting. Linzess does help with chronic constipation.  No loss of weight.  No immediate family history of colon cancer.  Thus far, no known arteriovenous malformations or dysplasias.      Past Medical History:   Diagnosis Date    Anxiety     Depression      Social History     Socioeconomic History    Marital status:      Spouse name: Not on file    Number of children: 3    Years of education: Not on file    Highest education level: Not on file   Occupational History    Occupation: Registered Nurse      Employer: [employer]     Comment: Cancer Center   Social Needs    Financial resource strain: Not very hard    Food insecurity     Worry: Never true     Inability: Never true    Transportation needs     Medical: No     Non-medical: No   Tobacco Use    Smoking status: Former Smoker     Quit date: 2007     Years since quittin.4    Smokeless tobacco: Never Used   Substance and Sexual Activity    Alcohol use: Yes     Frequency: Monthly or less     Drinks per session: 1 or 2     Binge frequency: Never    Drug use: No    Sexual activity: Yes      Partners: Male   Lifestyle    Physical activity     Days per week: Not on file     Minutes per session: Not on file    Stress: Very much   Relationships    Social connections     Talks on phone: Twice a week     Gets together: Once a week     Attends [employer] service: Not on file     Active member of club or organization: Yes     Attends meetings of clubs or organizations: 1 to 4 times per year     Relationship status:    Other Topics Concern    Not on file   Social History Narrative    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     Past Surgical History:   Procedure Laterality Date    HYSTERECTOMY      KNEE SURGERY      SHOULDER SURGERY      TRANSFORAMINAL EPIDURAL INJECTION OF STEROID Bilateral 6/4/2019    Procedure: Injection,steroid,epidural,transforaminal approach L4-5;  Surgeon: Jeyson Kent MD;  Location: [facility] OR;  Service: Pain Management;  Laterality: Bilateral;    TRANSFORAMINAL EPIDURAL INJECTION OF STEROID Bilateral 7/25/2019    Procedure: Injection,steroid,epidural,transforaminal approach;  Surgeon: Jeyson Kent MD;  Location: [facility] OR;  Service: Pain Management;  Laterality: Bilateral;  L4-5    vaginal sling       Family History   Problem Relation Age of Onset    Asthma Mother     Depression Mother     Hypertension Mother     Heart disease Father     Depression Father     Hypertension Father        Review of Systems   Constitutional: Positive for fatigue (even before bleeding). Negative for activity change, chills, fever and unexpected weight change.        Somewhat anxious about current condition.   HENT: Negative for congestion, hearing loss, rhinorrhea and trouble swallowing.    Eyes: Negative for pain, discharge and visual disturbance.   Respiratory: Negative for cough, chest tightness and wheezing.    Cardiovascular: Negative for chest pain and palpitations.   Gastrointestinal: Positive for anal bleeding, blood in stool and constipation. Negative for diarrhea  "and vomiting.   Endocrine: Negative for polydipsia and polyuria.   Genitourinary: Negative for difficulty urinating, dysuria, hematuria and menstrual problem.   Musculoskeletal: Positive for arthralgias. Negative for joint swelling and neck pain.   Neurological: Negative for weakness and headaches.   Psychiatric/Behavioral: Positive for behavioral problems. Negative for confusion and dysphoric mood. The patient is nervous/anxious.          Objective:      Blood pressure 110/70, pulse 77, temperature 98.5 °F (36.9 °C), resp. rate 16, height 5' 7" (1.702 m), weight 74.3 kg (163 lb 11.2 oz), SpO2 99 %. Body mass index is 25.64 kg/m².  Physical Exam  Vitals signs and nursing note reviewed.   Constitutional:       General: She is not in acute distress.     Appearance: She is well-developed. She is not ill-appearing, toxic-appearing or diaphoretic.   HENT:      Head: Normocephalic and atraumatic.   Eyes:      General: Lids are normal. Lids are everted, no foreign bodies appreciated. No scleral icterus.     Conjunctiva/sclera: Conjunctivae normal.      Pupils:      Right eye: Pupil is round and reactive.      Left eye: Pupil is round and reactive.   Neck:      Musculoskeletal: Neck supple.      Trachea: Trachea normal.        Comments: Right neck scar secondary to anterior cervical disc fusion  Cardiovascular:      Rate and Rhythm: Normal rate and regular rhythm.      Heart sounds: Normal heart sounds, S1 normal and S2 normal.   Pulmonary:      Breath sounds: Normal breath sounds.   Abdominal:      General: Bowel sounds are normal.      Palpations: Abdomen is soft. Abdomen is not rigid.      Tenderness: There is no abdominal tenderness. There is no guarding.      Comments: There is no tenderness in the abdomen and specifically no tenderness in the left lower quadrant.  Some discomfort on the suprapubic region.  No rigidity or rebound.   Genitourinary:         Comments: Rectal examination was " uncomfortable.  Musculoskeletal:         General: No tenderness or deformity.      Lumbar back: She exhibits normal range of motion.   Skin:     General: Skin is warm and dry.      Coloration: Skin is not jaundiced or pale.   Neurological:      General: No focal deficit present.      Mental Status: She is alert.   Psychiatric:         Behavior: Behavior normal. Behavior is cooperative.      Comments: Somewhat anhedonic mood.           Assessment:       1. Rectal bleeding    2. Grade II hemorrhoids    3. Anal or rectal pain           Orders Only on 06/24/2020   Component Date Value Ref Range Status    WBC 06/24/2020 6.1  3.8 - 10.8 Thousand/uL Final    RBC 06/24/2020 4.31  3.80 - 5.10 Million/uL Final    Hemoglobin 06/24/2020 13.1  11.7 - 15.5 g/dL Final    Hematocrit 06/24/2020 39.1  35.0 - 45.0 % Final    Mean Corpuscular Volume 06/24/2020 90.7  80.0 - 100.0 fL Final    Mean Corpuscular Hemoglobin 06/24/2020 30.4  27.0 - 33.0 pg Final    Mean Corpuscular Hemoglobin Conc 06/24/2020 33.5  32.0 - 36.0 g/dL Final    RDW 06/24/2020 12.4  11.0 - 15.0 % Final    Platelets 06/24/2020 260  140 - 400 Thousand/uL Final    MPV 06/24/2020 10.1  7.5 - 12.5 fL Final    Neutrophils Absolute 06/24/2020 2,318  1,500 - 7,800 cells/uL Final    Lymph # 06/24/2020 3,056  850 - 3,900 cells/uL Final    Mono # 06/24/2020 488  200 - 950 cells/uL Final    Eos # 06/24/2020 159  15 - 500 cells/uL Final    Baso # 06/24/2020 79  0 - 200 cells/uL Final    Neutrophils Relative 06/24/2020 38  % Final    Lymph% 06/24/2020 50.1  % Final    Mono% 06/24/2020 8.0  % Final    Eosinophil% 06/24/2020 2.6  % Final    Basophil% 06/24/2020 1.3  % Final   Lab Visit on 04/27/2020   Component Date Value Ref Range Status    COVID-19 (SARS CoV-2) IgG Ab 04/27/2020 Negative  Negative Final         Plan:           Rectal bleeding  -     CBC auto differential; Future; Expected date: 06/24/2020    Grade II hemorrhoids  -     hydrocortisone  (ANUSOL-HC) 25 mg suppository; Place 1 suppository (25 mg total) rectally 2 (two) times daily. for 10 days  Dispense: 20 suppository; Refill: 0    Anal or rectal pain     Patient presents with fresh red rectal bleeding which was moderately copious.  Working suspicion is that she probably bled from internal hemorrhoids.  She does have history of constipation and probably recent worsening of constipation might have aggravated that problem.    Other possibilities are anal fissure which causes her pain while passing stools.    Less likely is diverticulosis with diverticular bleeding but she has no pain in the left lower quadrant.  There is a family history of diverticulitis in mother.    Very less likely would be an AVM or angiodysplasia which could bleed intermittently.    Plan is to check a CBC on her to see whether her blood counts are okay.  If they are on the low side she might benefit from some iron supplements.    Continue with Linzess.    Consider sitz bath.    She is 50 at this point and she turned 50 on 06[birth year]18   She is still 1 year short of screening colonoscopy.  We may have to consider diagnostic sigmoidoscopy or colonoscopy if this continues to be an issue.    No family history of colon cancer.  Possibility for colon cancer with sudden spurt of bleeding is extremely low.    Patient examined with chaperone.    Patient notified about normal labs.        Current Outpatient Medications:     ADIPEX-P 37.5 mg capsule, Take 37.5 mg by mouth every morning., Disp: , Rfl:     estradiol (ESTRACE) 1 MG tablet, TAKE 1 TABLET BY MOUTH EVERY DAY, Disp: 90 tablet, Rfl: 1    hydrocortisone (ANUSOL-HC) 25 mg suppository, Place 1 suppository (25 mg total) rectally 2 (two) times daily. for 10 days, Disp: 20 suppository, Rfl: 0    LINZESS 145 mcg Cap capsule, TAKE 1 CAPSULE (145 MCG TOTAL) BY MOUTH ONCE DAILY., Disp: 30 capsule, Rfl: 1

## 2020-06-24 NOTE — PATIENT INSTRUCTIONS
Hemorrhoids    Hemorrhoids are swollen and inflamed veins inside the rectum and near the anus. The rectum is the last several inches of the colon. The anus is the passage between the rectum and the outside of the body.  Causes  The veins can become swollen due to increased pressure in them. This is most often caused by:  · Chronic constipation or diarrhea  · Straining when having a bowel movement  · Sitting too long on the toilet  · A low-fiber diet  · Pregnancy  Symptoms  · Bleeding from the rectum (this may be noticeable after bowel movements)  · Lump near the anus  · Itching around the anus  · Pain around the anus  There are different types of hemorrhoids. Depending on the type you have and the severity, you may be able to treat yourself at home. In some cases, a procedure may be the best treatment option. Your healthcare provider can tell you more about this, if needed.  Home care  General care  · To get relief from pain or itching, try:  ¨ Topical products. Your healthcare provider may prescribe or recommend creams, ointments, or pads that can be applied to the hemorrhoid. Use these exactly as directed.  ¨ Medicines. Your healthcare provider may recommend stool softeners, suppositories, or laxatives to help manage constipation. Use these exactly as directed.  ¨ Sitz baths. A sitz bath involves sitting in a few inches of warm bath water. Be careful not to make the water so hot that you burn yourself--test it before sitting in it. Soak for about 10 to 15 minutes a few times a day. This may help relieve pain.  Tips to help prevent hemorrhoids  · Eat more fiber. Fiber adds bulk to stool and absorbs water as it moves through your colon. This makes stool softer and easier to pass.  ¨ Increase the fiber in your diet with more fiber-rich foods. These include fresh fruit, vegetables, and whole grains.  ¨ Take a fiber supplement or bulking agent, if advised to by your provider. These include products such as psyllium  or methylcellulose.  · Drink plenty of water, if directed to by your provider. This can help keep stool soft.  · Be more active. Frequent exercise aids digestion and helps prevent constipation. It may also help make bowel movements more regular.  · Dont strain during bowel movements. This can make hemorrhoids more likely. Also, dont sit on the toilet for long periods of time.  Follow-up care  Follow up with your healthcare provider, or as advised. If a culture or imaging tests were done, you will be notified of the results when they are ready. This may take a few days or longer.  When to seek medical advice  Call your healthcare provider right away if any of these occur:  · Increased bleeding from the rectum  · Increased pain around the rectum or anus  · Weakness or dizziness  Call 911  Call 911 or return to the emergency department right away if any of these occur:  · Trouble breathing or swallowing  · Fainting or loss of consciousness  · Unusually fast heart rate  · Vomiting blood  · Large amounts of blood in stool  Date Last Reviewed: 6/22/2015 © 2000-2017 [employer]. [address] [place] [address]. All rights reserved. This information is not intended as a substitute for professional medical care. Always follow your healthcare professional's instructions.        Treating Hemorrhoids: Self-Care    Follow your healthcare providers advice about caring for your hemorrhoids at home. Some treatments help relieve symptoms right away. Others involve making changes in your diet and exercise habits. These can help ease constipation and prevent hemorrhoid symptoms from coming back.  Relieving symptoms  Your healthcare provider may prescribe anti-inflammatory medicine to help ease your symptoms. The following tips will also help relieve pain and swelling.  · Take sitz baths. Taking a sitz bath means sitting in a few inches of warm bath water. Soaking for 10 minutes twice a day can provide  welcome relief from painful hemorrhoids. It can also help the area stay clean.  · Develop good bowel habits. Use the bathroom when you need to. Dont ignore the urge to move your bowels. This can lead to constipation, hard stools, and straining. Also, dont read while on the toilet. Sit only as long as needed. Wipe gently with soft, unscented toilet tissue or baby wipes.  · Use ice packs. Placing an ice pack on a thrombosed external hemorrhoid can help relieve pain right away. It will also help reduce the blood clot. Use the ice for 15 to 20 minutes at a time. Keep a cloth between the ice and your skin to prevent skin damage.  · Use other measures. Laxatives and enemas can help ease constipation. But use them only on your healthcare providers advice. For symptom relief, try using cotton pads soaked in witch hazel. These are available at most drugstores. Over-the-counter hemorrhoid ointments and petroleum jelly can also provide relief.  Add fiber to your diet  Adding fiber to your diet can help relieve constipation by making stools softer and easier to pass. To increase your fiber intake, your healthcare provider may recommend a bulking agent, such as psyllium. This is a high-fiber supplement available at most grocery stores and drugstores. Eating more fiber-rich foods will also help. There are two types of fiber:  · Insoluble fiber is the main ingredient in bulking agents. Its also found in foods such as wheat bran, whole-grain breads, fresh fruits, and vegetables.  · Soluble fiber is found in foods such as oat bran. Although soluble fiber is good for you, it may not ease constipation as much as foods high in insoluble fiber.  Drink more water  Along with a high-fiber diet, drinking more water can help ease constipation. This is because insoluble fiber absorbs water, making stools soft and bulky. Be sure to drink plenty of water throughout the day. Drinking fruit juices, such as prune juice or apple juice, can  also help prevent constipation.  Get more exercise  Regular exercise aids digestion and helps prevent constipation. Its also great for your health. So talk with your healthcare provider about starting an exercise program. Low-impact activities, such as swimming or walking, are good places to start. Take it easy at first. And remember to drink plenty of water when you exercise.  High-fiber foods  High-fiber foods offer many benefits. By making your stools softer, they help heal and prevent swollen hemorrhoids. They may also help reduce the risk of colon and rectal cancer. Best of all, theyre usually low in calories and taste great. Here are some examples of fiber-rich foods.  · Whole grains, such as wheat bran, corn bran, and brown rice.  · Vegetables, especially carrots, broccoli, cabbage, and peas.  · Fruits, such as apples, bananas, raisins, peaches, and pears.  · Nuts and legumes, especially peanuts, lentils, and kidney beans.  Easy ways to add fiber  The tips below offer some simple ways to add more high-fiber foods to your meals.  · Start your day with a high-fiber breakfast. Eat a wheat bran cereal along with a sliced banana. Or, try peanut butter on whole-wheat toast.  · Eat carrot sticks for snacks. Theyre easy to prepare, taste great, and are low in calories.  · Use whole-grain breads instead of white bread for sandwiches.  · Eat fruits for treats. Try an apple and some raisins instead of a candy bar.   Date Last Reviewed: 7/1/2016  © 3248-6123 [employer]. [address] [place] [address], [place] [address]. All rights reserved. This information is not intended as a substitute for professional medical care. Always follow your healthcare professional's instructions.        Evaluating and Treating Rectal Bleeding     As part of your evaluation, a flexible sigmoidoscopy or colonoscopy may be done. You may also have an upper endoscopy if your stools are darker.     To find the site and cause of your  bleeding, you will have a physical exam. You will be asked about your health history. Tests may be done to help confirm the diagnosis and plan your treatment.  Tests you may have  Any of these procedures may be done:  · Stool sample. A small amount of your stool will be checked for blood.  · Anoscopy. This test uses a small tube (anoscope) to examine the anus. It checks for problems such as hemorrhoids.  · Sigmoidoscopy. This test uses a lighted tube to check your rectum and the part of the large intestine that is closest to the rectum (the sigmoid colon).  · Colonoscopy. This test looks at your rectum and entire colon. You may be given medicine through an IV to help you relax.  · Lower GI (gastrointestinal) series, or barium enema. This is an X-ray test to view your colon. A milky liquid containing barium is passed through your rectum and into the colon. This liquid makes it easy to see your colon on the X-ray.  · Upper endoscopy. This test checks your esophagus, stomach, and upper small intestine. It is done in cases of rectal bleeding along with other symptoms like low blood pressure and rapid heartbeat. This test may also be done if your stools are dark black and tarry.  · Capsule endoscopy. For this test, you swallow a pill that has a tiny camera inside. The camera takes pictures of your small intestine. It can get to areas that are hard to reach with colonoscopy and upper endoscopy.  · Balloon enteroscopy. This test uses a special tube (scope) to get deep into the small intestine.  · Tagged red blood cell scan. This test marks (tags) red blood cells with very small amounts of radioactive material. The cells can then be seen and tracked on a scan.  · Angiography. This test threads a tube (catheter) through a vein, often in the leg. The tube injects dye into your blood vessels to see where the bleeding is taking place.  Your treatment plan  Your treatment will depend on the cause of your rectal bleeding. Your  healthcare provider will create a treatment plan thats right for you. Sometimes rectal bleeding stops on its own. If it does, be sure to see your provider to check that the problem wasnt serious.  What you can do  Follow all your doctors instructions. Keep working with your doctor after your treatment. Make and keep your follow-up visits. If you have more rectal bleeding, call your doctor. It may be a sign of the same or another health problem.   Date Last Reviewed: 7/1/2016 © 2000-2017 [employer]. [address] [place] [address], [place] [address]. All rights reserved. This information is not intended as a substitute for professional medical care. Always follow your healthcare professional's instructions.

## 2020-06-25 LAB
BASOPHILS # BLD AUTO: 79 CELLS/UL (ref 0–200)
BASOPHILS NFR BLD AUTO: 1.3 %
EOSINOPHIL # BLD AUTO: 159 CELLS/UL (ref 15–500)
EOSINOPHIL NFR BLD AUTO: 2.6 %
ERYTHROCYTE [DISTWIDTH] IN BLOOD BY AUTOMATED COUNT: 12.4 % (ref 11–15)
HCT VFR BLD AUTO: 39.1 % (ref 35–45)
HGB BLD-MCNC: 13.1 G/DL (ref 11.7–15.5)
LYMPHOCYTES # BLD AUTO: 3056 CELLS/UL (ref 850–3900)
LYMPHOCYTES NFR BLD AUTO: 50.1 %
MCH RBC QN AUTO: 30.4 PG (ref 27–33)
MCHC RBC AUTO-ENTMCNC: 33.5 G/DL (ref 32–36)
MCV RBC AUTO: 90.7 FL (ref 80–100)
MONOCYTES # BLD AUTO: 488 CELLS/UL (ref 200–950)
MONOCYTES NFR BLD AUTO: 8 %
NEUTROPHILS # BLD AUTO: 2318 CELLS/UL (ref 1500–7800)
NEUTROPHILS NFR BLD AUTO: 38 %
PLATELET # BLD AUTO: 260 THOUSAND/UL (ref 140–400)
PMV BLD REES-ECKER: 10.1 FL (ref 7.5–12.5)
RBC # BLD AUTO: 4.31 MILLION/UL (ref 3.8–5.1)
WBC # BLD AUTO: 6.1 THOUSAND/UL (ref 3.8–10.8)

## 2020-12-23 ENCOUNTER — IMMUNIZATION (OUTPATIENT)
Dept: FAMILY MEDICINE | Facility: CLINIC | Age: 50
End: 2020-12-23
Payer: COMMERCIAL

## 2020-12-23 DIAGNOSIS — Z23 NEED FOR VACCINATION: ICD-10-CM

## 2020-12-23 PROCEDURE — 0001A COVID-19, MRNA, LNP-S, PF, 30 MCG/0.3 ML DOSE VACCINE: CPT | Mod: S$GLB,,, | Performed by: INTERNAL MEDICINE

## 2020-12-23 PROCEDURE — 91300 COVID-19, MRNA, LNP-S, PF, 30 MCG/0.3 ML DOSE VACCINE: CPT | Mod: S$GLB,,, | Performed by: INTERNAL MEDICINE

## 2020-12-23 PROCEDURE — 0001A COVID-19, MRNA, LNP-S, PF, 30 MCG/0.3 ML DOSE VACCINE: ICD-10-PCS | Mod: S$GLB,,, | Performed by: INTERNAL MEDICINE

## 2020-12-23 PROCEDURE — 91300 COVID-19, MRNA, LNP-S, PF, 30 MCG/0.3 ML DOSE VACCINE: ICD-10-PCS | Mod: S$GLB,,, | Performed by: INTERNAL MEDICINE

## 2021-01-12 ENCOUNTER — IMMUNIZATION (OUTPATIENT)
Dept: FAMILY MEDICINE | Facility: CLINIC | Age: 51
End: 2021-01-12
Payer: COMMERCIAL

## 2021-01-12 DIAGNOSIS — Z23 NEED FOR VACCINATION: ICD-10-CM

## 2021-01-12 PROCEDURE — 0002A COVID-19, MRNA, LNP-S, PF, 30 MCG/0.3 ML DOSE VACCINE: CPT | Mod: CV19,S$GLB,, | Performed by: INTERNAL MEDICINE

## 2021-01-12 PROCEDURE — 91300 COVID-19, MRNA, LNP-S, PF, 30 MCG/0.3 ML DOSE VACCINE: CPT | Mod: S$GLB,,, | Performed by: INTERNAL MEDICINE

## 2021-01-12 PROCEDURE — 91300 COVID-19, MRNA, LNP-S, PF, 30 MCG/0.3 ML DOSE VACCINE: ICD-10-PCS | Mod: S$GLB,,, | Performed by: INTERNAL MEDICINE

## 2021-01-12 PROCEDURE — 0002A COVID-19, MRNA, LNP-S, PF, 30 MCG/0.3 ML DOSE VACCINE: ICD-10-PCS | Mod: CV19,S$GLB,, | Performed by: INTERNAL MEDICINE

## 2021-02-10 DIAGNOSIS — Z12.31 ENCOUNTER FOR SCREENING MAMMOGRAM FOR BREAST CANCER: Primary | ICD-10-CM

## 2021-02-23 ENCOUNTER — HOSPITAL ENCOUNTER (OUTPATIENT)
Dept: RADIOLOGY | Facility: HOSPITAL | Age: 51
Discharge: HOME | End: 2021-02-23
Attending: INTERNAL MEDICINE
Payer: COMMERCIAL

## 2021-02-23 DIAGNOSIS — Z12.31 ENCOUNTER FOR SCREENING MAMMOGRAM FOR BREAST CANCER: ICD-10-CM

## 2021-02-23 PROCEDURE — 77067 SCR MAMMO BI INCL CAD: CPT | Mod: TC,PO

## 2021-02-24 ENCOUNTER — PATIENT MESSAGE (OUTPATIENT)
Dept: FAMILY MEDICINE | Facility: CLINIC | Age: 51
End: 2021-02-24

## 2021-02-24 DIAGNOSIS — R92.8 ABNORMAL MAMMOGRAM: Primary | ICD-10-CM

## 2021-03-01 ENCOUNTER — HOSPITAL ENCOUNTER (OUTPATIENT)
Dept: RADIOLOGY | Facility: HOSPITAL | Age: 51
Discharge: HOME | End: 2021-03-01
Attending: INTERNAL MEDICINE
Payer: COMMERCIAL

## 2021-03-01 DIAGNOSIS — R92.8 ABNORMAL MAMMOGRAM: ICD-10-CM

## 2021-03-01 PROCEDURE — 76642 ULTRASOUND BREAST LIMITED: CPT | Mod: TC,PO,RT

## 2021-03-01 PROCEDURE — 77061 BREAST TOMOSYNTHESIS UNI: CPT | Mod: TC,PO,RT

## 2021-03-15 ENCOUNTER — OFFICE VISIT (OUTPATIENT)
Dept: ORTHOPEDICS | Facility: CLINIC | Age: 51
End: 2021-03-15
Payer: COMMERCIAL

## 2021-03-15 VITALS
HEIGHT: 67 IN | SYSTOLIC BLOOD PRESSURE: 116 MMHG | WEIGHT: 169 LBS | DIASTOLIC BLOOD PRESSURE: 79 MMHG | HEART RATE: 80 BPM | BODY MASS INDEX: 26.53 KG/M2

## 2021-03-15 DIAGNOSIS — M51.36 DISC DEGENERATION, LUMBAR: ICD-10-CM

## 2021-03-15 DIAGNOSIS — M48.061 FORAMINAL STENOSIS OF LUMBAR REGION: Primary | ICD-10-CM

## 2021-03-15 DIAGNOSIS — M47.816 LUMBAR FACET ARTHROPATHY: ICD-10-CM

## 2021-03-15 PROCEDURE — 99213 OFFICE O/P EST LOW 20 MIN: CPT | Mod: S$GLB,,, | Performed by: ORTHOPAEDIC SURGERY

## 2021-03-15 PROCEDURE — 3008F BODY MASS INDEX DOCD: CPT | Mod: S$GLB,,, | Performed by: ORTHOPAEDIC SURGERY

## 2021-03-15 PROCEDURE — 3008F PR BODY MASS INDEX (BMI) DOCUMENTED: ICD-10-PCS | Mod: S$GLB,,, | Performed by: ORTHOPAEDIC SURGERY

## 2021-03-15 PROCEDURE — 1125F PR PAIN SEVERITY QUANTIFIED, PAIN PRESENT: ICD-10-PCS | Mod: S$GLB,,, | Performed by: ORTHOPAEDIC SURGERY

## 2021-03-15 PROCEDURE — 1125F AMNT PAIN NOTED PAIN PRSNT: CPT | Mod: S$GLB,,, | Performed by: ORTHOPAEDIC SURGERY

## 2021-03-15 PROCEDURE — 99213 PR OFFICE/OUTPT VISIT, EST, LEVL III, 20-29 MIN: ICD-10-PCS | Mod: S$GLB,,, | Performed by: ORTHOPAEDIC SURGERY

## 2021-03-18 ENCOUNTER — PATIENT MESSAGE (OUTPATIENT)
Dept: ORTHOPEDICS | Facility: CLINIC | Age: 51
End: 2021-03-18

## 2021-03-18 DIAGNOSIS — M51.36 BULGING OF LUMBAR INTERVERTEBRAL DISC: ICD-10-CM

## 2021-03-18 DIAGNOSIS — M51.36 DISC DEGENERATION, LUMBAR: Primary | ICD-10-CM

## 2021-03-18 DIAGNOSIS — M47.816 LUMBAR FACET ARTHROPATHY: ICD-10-CM

## 2021-03-19 ENCOUNTER — PATIENT MESSAGE (OUTPATIENT)
Dept: ORTHOPEDICS | Facility: CLINIC | Age: 51
End: 2021-03-19

## 2021-03-22 ENCOUNTER — HOSPITAL ENCOUNTER (OUTPATIENT)
Dept: RADIOLOGY | Facility: HOSPITAL | Age: 51
Discharge: HOME | End: 2021-03-22
Attending: ORTHOPAEDIC SURGERY
Payer: COMMERCIAL

## 2021-03-22 DIAGNOSIS — M51.36 DISC DEGENERATION, LUMBAR: ICD-10-CM

## 2021-03-22 DIAGNOSIS — M47.816 LUMBAR FACET ARTHROPATHY: ICD-10-CM

## 2021-03-22 DIAGNOSIS — M48.061 FORAMINAL STENOSIS OF LUMBAR REGION: ICD-10-CM

## 2021-03-22 PROCEDURE — 72148 MRI LUMBAR SPINE W/O DYE: CPT | Mod: TC,PO

## 2021-03-29 ENCOUNTER — OFFICE VISIT (OUTPATIENT)
Dept: ORTHOPEDICS | Facility: CLINIC | Age: 51
End: 2021-03-29
Payer: COMMERCIAL

## 2021-03-29 VITALS
WEIGHT: 165 LBS | BODY MASS INDEX: 25.9 KG/M2 | HEART RATE: 81 BPM | HEIGHT: 67 IN | DIASTOLIC BLOOD PRESSURE: 80 MMHG | SYSTOLIC BLOOD PRESSURE: 142 MMHG

## 2021-03-29 DIAGNOSIS — M47.816 LUMBAR FACET ARTHROPATHY: ICD-10-CM

## 2021-03-29 DIAGNOSIS — M51.36 DISC DEGENERATION, LUMBAR: Primary | ICD-10-CM

## 2021-03-29 PROCEDURE — 3008F BODY MASS INDEX DOCD: CPT | Mod: S$GLB,,, | Performed by: ORTHOPAEDIC SURGERY

## 2021-03-29 PROCEDURE — 1125F AMNT PAIN NOTED PAIN PRSNT: CPT | Mod: S$GLB,,, | Performed by: ORTHOPAEDIC SURGERY

## 2021-03-29 PROCEDURE — 99213 PR OFFICE/OUTPT VISIT, EST, LEVL III, 20-29 MIN: ICD-10-PCS | Mod: S$GLB,,, | Performed by: ORTHOPAEDIC SURGERY

## 2021-03-29 PROCEDURE — 99213 OFFICE O/P EST LOW 20 MIN: CPT | Mod: S$GLB,,, | Performed by: ORTHOPAEDIC SURGERY

## 2021-03-29 PROCEDURE — 3008F PR BODY MASS INDEX (BMI) DOCUMENTED: ICD-10-PCS | Mod: S$GLB,,, | Performed by: ORTHOPAEDIC SURGERY

## 2021-03-29 PROCEDURE — 1125F PR PAIN SEVERITY QUANTIFIED, PAIN PRESENT: ICD-10-PCS | Mod: S$GLB,,, | Performed by: ORTHOPAEDIC SURGERY

## 2021-03-29 RX ORDER — IBUPROFEN AND FAMOTIDINE 800; 26.6 MG/1; MG/1
1 TABLET, COATED ORAL 3 TIMES DAILY
Qty: 90 TABLET | Refills: 3 | Status: SHIPPED | OUTPATIENT
Start: 2021-03-29 | End: 2021-04-28

## 2021-03-31 ENCOUNTER — PATIENT MESSAGE (OUTPATIENT)
Dept: ORTHOPEDICS | Facility: CLINIC | Age: 51
End: 2021-03-31

## 2021-03-31 DIAGNOSIS — M47.816 LUMBAR FACET ARTHROPATHY: ICD-10-CM

## 2021-03-31 DIAGNOSIS — M51.36 DISC DEGENERATION, LUMBAR: Primary | ICD-10-CM

## 2021-03-31 RX ORDER — CYCLOBENZAPRINE HCL 10 MG
10 TABLET ORAL 3 TIMES DAILY PRN
Qty: 90 TABLET | Refills: 1 | Status: SHIPPED | OUTPATIENT
Start: 2021-03-31 | End: 2021-04-30

## 2021-04-09 ENCOUNTER — OFFICE VISIT (OUTPATIENT)
Dept: PHYSICAL MEDICINE AND REHAB | Facility: CLINIC | Age: 51
End: 2021-04-09
Payer: COMMERCIAL

## 2021-04-09 VITALS
WEIGHT: 165 LBS | SYSTOLIC BLOOD PRESSURE: 126 MMHG | DIASTOLIC BLOOD PRESSURE: 79 MMHG | HEIGHT: 67 IN | BODY MASS INDEX: 25.9 KG/M2 | HEART RATE: 77 BPM

## 2021-04-09 DIAGNOSIS — M47.816 LUMBAR FACET ARTHROPATHY: ICD-10-CM

## 2021-04-09 DIAGNOSIS — Z74.09 IMPAIRED FUNCTIONAL MOBILITY AND ACTIVITY TOLERANCE: ICD-10-CM

## 2021-04-09 DIAGNOSIS — G89.29 OTHER CHRONIC PAIN: ICD-10-CM

## 2021-04-09 DIAGNOSIS — M47.816 LUMBAR SPONDYLOSIS: ICD-10-CM

## 2021-04-09 DIAGNOSIS — M54.50 CHRONIC BILATERAL LOW BACK PAIN WITHOUT SCIATICA: ICD-10-CM

## 2021-04-09 DIAGNOSIS — M51.36 ANNULAR TEAR OF LUMBAR DISC: Primary | ICD-10-CM

## 2021-04-09 DIAGNOSIS — M51.36 DISC DEGENERATION, LUMBAR: ICD-10-CM

## 2021-04-09 DIAGNOSIS — G89.29 CHRONIC BILATERAL LOW BACK PAIN WITHOUT SCIATICA: ICD-10-CM

## 2021-04-09 PROCEDURE — 99999 PR PBB SHADOW E&M-EST. PATIENT-LVL III: ICD-10-PCS | Mod: PBBFAC,,, | Performed by: PHYSICAL MEDICINE & REHABILITATION

## 2021-04-09 PROCEDURE — 99999 PR PBB SHADOW E&M-EST. PATIENT-LVL III: CPT | Mod: PBBFAC,,, | Performed by: PHYSICAL MEDICINE & REHABILITATION

## 2021-04-09 PROCEDURE — 3008F BODY MASS INDEX DOCD: CPT | Mod: CPTII,S$GLB,, | Performed by: PHYSICAL MEDICINE & REHABILITATION

## 2021-04-09 PROCEDURE — 99204 PR OFFICE/OUTPT VISIT, NEW, LEVL IV, 45-59 MIN: ICD-10-PCS | Mod: S$GLB,,, | Performed by: PHYSICAL MEDICINE & REHABILITATION

## 2021-04-09 PROCEDURE — 99204 OFFICE O/P NEW MOD 45 MIN: CPT | Mod: S$GLB,,, | Performed by: PHYSICAL MEDICINE & REHABILITATION

## 2021-04-09 PROCEDURE — 3008F PR BODY MASS INDEX (BMI) DOCUMENTED: ICD-10-PCS | Mod: CPTII,S$GLB,, | Performed by: PHYSICAL MEDICINE & REHABILITATION

## 2021-04-09 PROCEDURE — 1125F PR PAIN SEVERITY QUANTIFIED, PAIN PRESENT: ICD-10-PCS | Mod: S$GLB,,, | Performed by: PHYSICAL MEDICINE & REHABILITATION

## 2021-04-09 PROCEDURE — 1125F AMNT PAIN NOTED PAIN PRSNT: CPT | Mod: S$GLB,,, | Performed by: PHYSICAL MEDICINE & REHABILITATION

## 2021-04-22 ENCOUNTER — OFFICE VISIT (OUTPATIENT)
Dept: PHYSICAL MEDICINE AND REHAB | Facility: CLINIC | Age: 51
End: 2021-04-22
Payer: COMMERCIAL

## 2021-04-22 VITALS
SYSTOLIC BLOOD PRESSURE: 123 MMHG | BODY MASS INDEX: 25.9 KG/M2 | WEIGHT: 165 LBS | HEIGHT: 67 IN | HEART RATE: 70 BPM | DIASTOLIC BLOOD PRESSURE: 83 MMHG

## 2021-04-22 DIAGNOSIS — M53.3 SACROILIAC PAIN: Primary | ICD-10-CM

## 2021-04-22 PROCEDURE — 99999 PR PBB SHADOW E&M-EST. PATIENT-LVL III: CPT | Mod: PBBFAC,,, | Performed by: PHYSICAL MEDICINE & REHABILITATION

## 2021-04-22 PROCEDURE — 99999 PR PBB SHADOW E&M-EST. PATIENT-LVL III: ICD-10-PCS | Mod: PBBFAC,,, | Performed by: PHYSICAL MEDICINE & REHABILITATION

## 2021-04-22 PROCEDURE — 1125F PR PAIN SEVERITY QUANTIFIED, PAIN PRESENT: ICD-10-PCS | Mod: S$GLB,,, | Performed by: PHYSICAL MEDICINE & REHABILITATION

## 2021-04-22 PROCEDURE — 27096 PR INJECTION,SACROILIAC JOINT: ICD-10-PCS | Mod: LT,S$GLB,, | Performed by: PHYSICAL MEDICINE & REHABILITATION

## 2021-04-22 PROCEDURE — 99499 NO LOS: ICD-10-PCS | Mod: S$GLB,,, | Performed by: PHYSICAL MEDICINE & REHABILITATION

## 2021-04-22 PROCEDURE — 27096 INJECT SACROILIAC JOINT: CPT | Mod: LT,S$GLB,, | Performed by: PHYSICAL MEDICINE & REHABILITATION

## 2021-04-22 PROCEDURE — 3008F PR BODY MASS INDEX (BMI) DOCUMENTED: ICD-10-PCS | Mod: CPTII,S$GLB,, | Performed by: PHYSICAL MEDICINE & REHABILITATION

## 2021-04-22 PROCEDURE — 3008F BODY MASS INDEX DOCD: CPT | Mod: CPTII,S$GLB,, | Performed by: PHYSICAL MEDICINE & REHABILITATION

## 2021-04-22 PROCEDURE — 1125F AMNT PAIN NOTED PAIN PRSNT: CPT | Mod: S$GLB,,, | Performed by: PHYSICAL MEDICINE & REHABILITATION

## 2021-04-22 PROCEDURE — 99499 UNLISTED E&M SERVICE: CPT | Mod: S$GLB,,, | Performed by: PHYSICAL MEDICINE & REHABILITATION

## 2021-08-19 ENCOUNTER — PATIENT MESSAGE (OUTPATIENT)
Dept: FAMILY MEDICINE | Facility: CLINIC | Age: 51
End: 2021-08-19

## 2021-11-01 DIAGNOSIS — N95.1 HOT FLASHES DUE TO MENOPAUSE: ICD-10-CM

## 2021-11-01 RX ORDER — ESTRADIOL 1 MG/1
1 TABLET ORAL DAILY
Qty: 60 TABLET | Refills: 0 | Status: SHIPPED | OUTPATIENT
Start: 2021-11-01 | End: 2022-01-30

## 2021-11-11 ENCOUNTER — IMMUNIZATION (OUTPATIENT)
Dept: FAMILY MEDICINE | Facility: CLINIC | Age: 51
End: 2021-11-11
Payer: COMMERCIAL

## 2021-11-11 DIAGNOSIS — Z23 NEED FOR VACCINATION: Primary | ICD-10-CM

## 2021-11-11 PROCEDURE — 0004A COVID-19, MRNA, LNP-S, PF, 30 MCG/0.3 ML DOSE VACCINE: CPT | Mod: ,,, | Performed by: FAMILY MEDICINE

## 2021-11-11 PROCEDURE — 91300 COVID-19, MRNA, LNP-S, PF, 30 MCG/0.3 ML DOSE VACCINE: CPT | Mod: ,,, | Performed by: FAMILY MEDICINE

## 2021-11-11 PROCEDURE — 91300 COVID-19, MRNA, LNP-S, PF, 30 MCG/0.3 ML DOSE VACCINE: ICD-10-PCS | Mod: ,,, | Performed by: FAMILY MEDICINE

## 2021-11-11 PROCEDURE — 0004A COVID-19, MRNA, LNP-S, PF, 30 MCG/0.3 ML DOSE VACCINE: ICD-10-PCS | Mod: ,,, | Performed by: FAMILY MEDICINE

## 2022-03-31 ENCOUNTER — OFFICE VISIT (OUTPATIENT)
Dept: ORTHOPEDICS | Facility: CLINIC | Age: 52
End: 2022-03-31
Payer: COMMERCIAL

## 2022-03-31 VITALS — WEIGHT: 165 LBS | BODY MASS INDEX: 25.9 KG/M2 | HEIGHT: 67 IN

## 2022-03-31 DIAGNOSIS — M17.11 PRIMARY OSTEOARTHRITIS OF RIGHT KNEE: ICD-10-CM

## 2022-03-31 DIAGNOSIS — Z98.890 HISTORY OF ARTHROSCOPY OF RIGHT KNEE: Primary | ICD-10-CM

## 2022-03-31 DIAGNOSIS — M18.12 ARTHRITIS OF CARPOMETACARPAL (CMC) JOINT OF LEFT THUMB: ICD-10-CM

## 2022-03-31 PROCEDURE — 3008F BODY MASS INDEX DOCD: CPT | Mod: S$GLB,,, | Performed by: ORTHOPAEDIC SURGERY

## 2022-03-31 PROCEDURE — 20610 DRAIN/INJ JOINT/BURSA W/O US: CPT | Mod: RT,S$GLB,, | Performed by: ORTHOPAEDIC SURGERY

## 2022-03-31 PROCEDURE — 1160F PR REVIEW ALL MEDS BY PRESCRIBER/CLIN PHARMACIST DOCUMENTED: ICD-10-PCS | Mod: S$GLB,,, | Performed by: ORTHOPAEDIC SURGERY

## 2022-03-31 PROCEDURE — 1160F RVW MEDS BY RX/DR IN RCRD: CPT | Mod: S$GLB,,, | Performed by: ORTHOPAEDIC SURGERY

## 2022-03-31 PROCEDURE — 99213 PR OFFICE/OUTPT VISIT, EST, LEVL III, 20-29 MIN: ICD-10-PCS | Mod: 25,S$GLB,, | Performed by: ORTHOPAEDIC SURGERY

## 2022-03-31 PROCEDURE — 20610 LARGE JOINT ASPIRATION/INJECTION: R KNEE: ICD-10-PCS | Mod: RT,S$GLB,, | Performed by: ORTHOPAEDIC SURGERY

## 2022-03-31 PROCEDURE — 3008F PR BODY MASS INDEX (BMI) DOCUMENTED: ICD-10-PCS | Mod: S$GLB,,, | Performed by: ORTHOPAEDIC SURGERY

## 2022-03-31 PROCEDURE — 20600 SMALL JOINT ASPIRATION/INJECTION: L THUMB MCP: ICD-10-PCS | Mod: 51,LT,S$GLB, | Performed by: ORTHOPAEDIC SURGERY

## 2022-03-31 PROCEDURE — 99213 OFFICE O/P EST LOW 20 MIN: CPT | Mod: 25,S$GLB,, | Performed by: ORTHOPAEDIC SURGERY

## 2022-03-31 PROCEDURE — 20600 DRAIN/INJ JOINT/BURSA W/O US: CPT | Mod: 51,LT,S$GLB, | Performed by: ORTHOPAEDIC SURGERY

## 2022-03-31 RX ORDER — TRIAMCINOLONE ACETONIDE 40 MG/ML
40 INJECTION, SUSPENSION INTRA-ARTICULAR; INTRAMUSCULAR
Status: DISCONTINUED | OUTPATIENT
Start: 2022-03-31 | End: 2022-03-31 | Stop reason: HOSPADM

## 2022-03-31 RX ORDER — IBUPROFEN 200 MG
200 TABLET ORAL EVERY 6 HOURS PRN
COMMUNITY
End: 2023-01-12

## 2022-03-31 RX ADMIN — TRIAMCINOLONE ACETONIDE 40 MG: 40 INJECTION, SUSPENSION INTRA-ARTICULAR; INTRAMUSCULAR at 01:03

## 2022-03-31 NOTE — PROGRESS NOTES
[facility] ORTHOPEDICS    Subjective:     Chief Complaint:   Chief Complaint   Patient presents with    Right Knee - Pain     Pt is here with complaints of Right knee pain , 2005 torn meniscus sx, Pops, grinds & gives away. Left knee pain & hip pain    Left Knee - Pain    Left Hip - Pain    Left Hand - Pain     Thumb Left hand pain x 2 days.        Past Medical History:   Diagnosis Date    Anxiety     Depression        Past Surgical History:   Procedure Laterality Date    HYSTERECTOMY      KNEE SURGERY      SHOULDER SURGERY      TRANSFORAMINAL EPIDURAL INJECTION OF STEROID Bilateral 6/4/2019    Procedure: Injection,steroid,epidural,transforaminal approach L4-5;  Surgeon: Jeyson Kent MD;  Location: [facility];  Service: Pain Management;  Laterality: Bilateral;    TRANSFORAMINAL EPIDURAL INJECTION OF STEROID Bilateral 7/25/2019    Procedure: Injection,steroid,epidural,transforaminal approach;  Surgeon: Jeyson Kent MD;  Location: [facility] OR;  Service: Pain Management;  Laterality: Bilateral;  L4-5    vaginal sling         Current Outpatient Medications   Medication Sig    ADIPEX-P 37.5 mg capsule Take 37.5 mg by mouth every morning.    estradioL (ESTRACE) 1 MG tablet TAKE 1 TABLET BY MOUTH EVERY DAY    ibuprofen (ADVIL,MOTRIN) 200 MG tablet Take 200 mg by mouth every 6 (six) hours as needed for Pain. 800 mg in am, 200 at noon & 200 at bedtime    LINZESS 145 mcg Cap capsule TAKE 1 CAPSULE (145 MCG TOTAL) BY MOUTH ONCE DAILY. (Patient not taking: Reported on 3/31/2022)     No current facility-administered medications for this visit.       Review of patient's allergies indicates:  No Known Allergies    Family History   Problem Relation Age of Onset    Asthma Mother     Depression Mother     Hypertension Mother     Heart disease Father     Depression Father     Hypertension Father        Social History     Socioeconomic History    Marital status:     Number of children: 3   Occupational History     Occupation: Registered Nurse      Employer: [employer]     Comment: Cancer Center   Tobacco Use    Smoking status: Former Smoker     Quit date: 1/1/2007     Years since quitting: 15.2    Smokeless tobacco: Never Used   Substance and Sexual Activity    Alcohol use: Yes    Drug use: No    Sexual activity: Yes     Partners: Male   Social History Narrative    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       History of present illness:  Saima comes in today with chief complaint of right knee pain and left thumb pain that has been going on for several months.  She does report a previous history of a left knee arthroscopy with partial medial meniscectomy in 2005.  She has been doing quite well to the past 6 months which she is beginning to notice some medial right knee pain.  She denies any new injury or trauma.    Review of Systems:    Constitution: Negative for chills, fever, and sweats.  Negative for unexplained weight loss.    HENT:  Negative for headaches and blurry vision.    Cardiovascular:Negative for chest pain or irregular heart beat. Negative for hypertension.    Respiratory:  Negative for cough and shortness of breath.    Gastrointestinal: Negative for abdominal pain, heartburn, melena, nausea, and vomitting.    Genitourinary:  Negative bladder incontinence and dysuria.    Musculoskeletal:  See HPI for details.     Neurological: Negative for numbness.    Psychiatric/Behavioral: Negative for depression.  The patient is not nervous/anxious.      Endocrine: Negative for polyuria    Hematologic/Lymphatic: Negative for bleeding problem.  Does not bruise/bleed easily.    Skin: Negative for poor would healing and rash    Objective:      Physical Examination:    Vital Signs:  There were no vitals filed for this visit.    Body mass index is 25.84 kg/m².    This a well-developed, well nourished patient in no acute distress.  They are alert and oriented and cooperative to  examination.        Right knee exam:  Skin right knee is clean dry and intact.  There is no erythema or ecchymosis.  There are no signs or symptoms of infection.  Patient is neurovascular intact throughout the right lower extremity.  Right calf is soft and nontender.  Patient can weightbear as tolerated right lower extremity.  Right knee active range of motion is approximately 0-110 degrees.  Right knee is stable to varus and valgus stresses.  Patient is diffusely tender along the medial aspect of the right knee.  She has crepitus range of motioning of the right knee as well.    Left thumb exam:  Skin to her left hand and thumb is clean dry and intact.  There is no erythema or ecchymosis.  There are no signs or symptoms of infection.  Patient is neurovascularly intact throughout the left upper extremity.  She can open and close left hand into a fist.  She had post left thumb to all digits in her left hand.  She does have a negative Finkelstein's.  She has no tenderness to palpation over her anatomical snuffbox.  She does have a positive basilar grind on the left.    Pertinent New Results:    XRAY Report / Interpretation:   Three views were taken of her right knee today:  AP, lateral, and sunrise views.  They reveal no acute fractures or dislocations.  Patient does have moderate arthrosis in the medial compartment of the right knee.  Visualized soft tissues are unremarkable.    Three views were taken of the left hand today:  AP, lateral, and oblique views.  They reveal no acute fractures or dislocations.  Visualized soft tissues are unremarkable.  Patient does have arthrosis of the left thumb basilar joint.    Assessment/Plan:      1.  Right knee osteoarthritis.  2.  Left thumb basilar arthritis.    I injected her right knee today via an anterior lateral approach with 40 mg of Kenalog and lidocaine.  Also injected her left thumb today at the basilar joint with 40 mg of Kenalog and lidocaine.  She tolerated both these  "injections well.  Did have a brief discussion with her the likelihood of her needing surgical intervention to her left knee at some point in the future as well as interpositional arthroplasty for the left thumb.  We will check her back in 3 months to see how she is doing with these injections.    Kit An, Physician Assistant, served in the capacity as a "scribe" for this patient encounter.  A "face-to-face" encounter occurred with Dr. Eduar Whittington on this date.  The treatment plan and medical decision-making is outlined above. Patient was seen and examined with a chaperone.       This note was created using Dragon voice recognition software that occasionally misinterpreted phrases or words.          "

## 2022-03-31 NOTE — PROCEDURES
Large Joint Aspiration/Injection: R knee    Date/Time: 3/31/2022 1:00 PM  Performed by: Eduar Whittington MD  Authorized by: Eduar Whittington MD     Consent Done?:  Yes (Verbal)  Indications:  Arthritis  Site marked: the procedure site was marked    Timeout: prior to procedure the correct patient, procedure, and site was verified    Prep: patient was prepped and draped in usual sterile fashion      Local anesthesia used?: Yes    Local anesthetic:  Lidocaine 1% without epinephrine  Ultrasonic Guidance for needle placement?: No    Location:  Knee  Site:  R knee  Medications:  40 mg triamcinolone acetonide 40 mg/mL  Patient tolerance:  Patient tolerated the procedure well with no immediate complications  Small Joint Aspiration/Injection: L thumb MCP    Date/Time: 3/31/2022 1:00 PM  Performed by: Eduar Whittington MD  Authorized by: Eduar Whittington MD     Consent Done?:  Yes (Verbal)  Indications:  Pain  Site marked: the procedure site was marked    Timeout: prior to procedure the correct patient, procedure, and site was verified    Prep: patient was prepped and draped in usual sterile fashion      Local anesthesia used?: Yes    Local anesthetic:  Lidocaine 1% without epinephrine  Location:  Thumb  Site:  L thumb MCP  Ultrasonic guidance for needle placement?: No    Needle size:  25 G  Medications:  40 mg triamcinolone acetonide 40 mg/mL  Patient tolerance:  Patient tolerated the procedure well with no immediate complications

## 2022-05-06 ENCOUNTER — PATIENT MESSAGE (OUTPATIENT)
Dept: FAMILY MEDICINE | Facility: CLINIC | Age: 52
End: 2022-05-06

## 2022-05-12 DIAGNOSIS — Z12.31 ENCOUNTER FOR SCREENING MAMMOGRAM FOR MALIGNANT NEOPLASM OF BREAST: Primary | ICD-10-CM

## 2022-05-27 ENCOUNTER — HOSPITAL ENCOUNTER (OUTPATIENT)
Dept: RADIOLOGY | Facility: HOSPITAL | Age: 52
Discharge: HOME | End: 2022-05-27
Attending: INTERNAL MEDICINE
Payer: COMMERCIAL

## 2022-05-27 DIAGNOSIS — Z12.31 ENCOUNTER FOR SCREENING MAMMOGRAM FOR MALIGNANT NEOPLASM OF BREAST: ICD-10-CM

## 2022-05-27 PROCEDURE — 77063 BREAST TOMOSYNTHESIS BI: CPT | Mod: TC,PO

## 2022-05-31 ENCOUNTER — OFFICE VISIT (OUTPATIENT)
Dept: PHYSICAL MEDICINE AND REHAB | Facility: CLINIC | Age: 52
End: 2022-05-31
Payer: COMMERCIAL

## 2022-05-31 VITALS — BODY MASS INDEX: 25.9 KG/M2 | HEIGHT: 67 IN | WEIGHT: 165 LBS

## 2022-05-31 DIAGNOSIS — M51.36 DDD (DEGENERATIVE DISC DISEASE), LUMBAR: ICD-10-CM

## 2022-05-31 DIAGNOSIS — M53.3 SACROILIAC JOINT DYSFUNCTION OF LEFT SIDE: Primary | ICD-10-CM

## 2022-05-31 DIAGNOSIS — M54.50 CHRONIC LEFT-SIDED LOW BACK PAIN WITHOUT SCIATICA: ICD-10-CM

## 2022-05-31 DIAGNOSIS — G89.29 CHRONIC LEFT-SIDED LOW BACK PAIN WITHOUT SCIATICA: ICD-10-CM

## 2022-05-31 PROCEDURE — 27096 INJECT SACROILIAC JOINT: CPT | Mod: S$GLB,,, | Performed by: PHYSICAL MEDICINE & REHABILITATION

## 2022-05-31 PROCEDURE — 1159F PR MEDICATION LIST DOCUMENTED IN MEDICAL RECORD: ICD-10-PCS | Mod: CPTII,S$GLB,, | Performed by: PHYSICAL MEDICINE & REHABILITATION

## 2022-05-31 PROCEDURE — 3008F PR BODY MASS INDEX (BMI) DOCUMENTED: ICD-10-PCS | Mod: CPTII,S$GLB,, | Performed by: PHYSICAL MEDICINE & REHABILITATION

## 2022-05-31 PROCEDURE — 99213 PR OFFICE/OUTPT VISIT, EST, LEVL III, 20-29 MIN: ICD-10-PCS | Mod: 25,S$GLB,, | Performed by: PHYSICAL MEDICINE & REHABILITATION

## 2022-05-31 PROCEDURE — 3008F BODY MASS INDEX DOCD: CPT | Mod: CPTII,S$GLB,, | Performed by: PHYSICAL MEDICINE & REHABILITATION

## 2022-05-31 PROCEDURE — 27096 PR INJECTION,SACROILIAC JOINT: ICD-10-PCS | Mod: S$GLB,,, | Performed by: PHYSICAL MEDICINE & REHABILITATION

## 2022-05-31 PROCEDURE — 99999 PR PBB SHADOW E&M-EST. PATIENT-LVL II: CPT | Mod: PBBFAC,,, | Performed by: PHYSICAL MEDICINE & REHABILITATION

## 2022-05-31 PROCEDURE — 99999 PR PBB SHADOW E&M-EST. PATIENT-LVL II: ICD-10-PCS | Mod: PBBFAC,,, | Performed by: PHYSICAL MEDICINE & REHABILITATION

## 2022-05-31 PROCEDURE — 1159F MED LIST DOCD IN RCRD: CPT | Mod: CPTII,S$GLB,, | Performed by: PHYSICAL MEDICINE & REHABILITATION

## 2022-05-31 PROCEDURE — 99213 OFFICE O/P EST LOW 20 MIN: CPT | Mod: 25,S$GLB,, | Performed by: PHYSICAL MEDICINE & REHABILITATION

## 2022-05-31 NOTE — PROGRESS NOTES
Chief Complaint   Patient presents with    Back Pain     Left SI joint inj         HPI: Saima Winkler is a  51 y.o. female who presents today for followup. she was last seen in clinic over 1 year ago at which time I performed a left SI joint injection.  This provided her 1 year of near 100% improvement in pain and function.  Over the last few weeks, her pain has returned and is otherwise unchanged in location, duration, intensity, or characteristics.  She denies any referred or radicular symptoms.  No new or worsening bowel or bladder dysfunction.        Review of Systems   Constitutional: Negative for chills and fever.   HENT: Negative for congestion and tinnitus.    Eyes: Negative for blurred vision and photophobia.   Respiratory: Negative for shortness of breath and wheezing.    Cardiovascular: Negative for chest pain and palpitations.   Gastrointestinal: Negative for nausea and vomiting.   Genitourinary: Negative for dysuria and frequency.   Musculoskeletal: Positive for back pain. Negative for joint pain and myalgias.   Skin: Negative for itching and rash.   Neurological: Negative for speech change and focal weakness.   Endo/Heme/Allergies: Negative for environmental allergies. Does not bruise/bleed easily.   Psychiatric/Behavioral: Negative for depression. The patient is not nervous/anxious.             Past Medical History:   Diagnosis Date    Anxiety     Depression           Current Outpatient Medications on File Prior to Visit   Medication Sig Dispense Refill    ADIPEX-P 37.5 mg capsule Take 37.5 mg by mouth every morning.      estradioL (ESTRACE) 1 MG tablet TAKE 1 TABLET BY MOUTH EVERY DAY 90 tablet 0    ibuprofen (ADVIL,MOTRIN) 200 MG tablet Take 200 mg by mouth every 6 (six) hours as needed for Pain. 800 mg in am, 200 at noon & 200 at bedtime      LINZESS 145 mcg Cap capsule TAKE 1 CAPSULE (145 MCG TOTAL) BY MOUTH ONCE DAILY. (Patient not taking: Reported on 3/31/2022) 30 capsule 5     No  current facility-administered medications on file prior to visit.              Physical Exam:  There were no vitals filed for this visit.  Physical Exam  Constitutional:       General: She is not in acute distress.     Appearance: Normal appearance.   HENT:      Head: Normocephalic and atraumatic.      Nose: Nose normal. No congestion.      Mouth/Throat:      Mouth: Mucous membranes are moist.      Pharynx: Oropharynx is clear.   Eyes:      Extraocular Movements: Extraocular movements intact.      Pupils: Pupils are equal, round, and reactive to light.   Neck:      Trachea: Trachea and phonation normal.   Pulmonary:      Effort: Pulmonary effort is normal. No respiratory distress.   Abdominal:      General: Abdomen is flat. There is no distension.   Musculoskeletal:      Lumbar back: Bony tenderness (Left SI joint) present.        Back:    Skin:     General: Skin is warm and dry.   Neurological:      Mental Status: She is alert and oriented to person, place, and time. Mental status is at baseline.   Psychiatric:         Mood and Affect: Mood and affect normal.         Behavior: Behavior normal.       Ortho Exam    MRI lumbar spine without contrast     CLINICAL DATA: Lower back pain.     FINDINGS: Multiplanar noncontrast imaging was performed. Comparison is  made to May 2019.     There is no evidence of lumbar fracture, subluxation, or osseous  destructive lesion. Marrow signal is mildly heterogeneous. Small  vertebral body hemangiomas are noted at L1 and L3, similar to the  prior study. Signal within the conus medullaris is within normal  limits. Multilevel disc desiccation is noted.     T12-L1: No significant abnormalities.     L1-2: Minimal broad-based disc bulging without central canal or  foraminal stenosis, unchanged.     L2-3: Mild broad-based disc bulge results in mild narrowing of the  central canal, without significant foraminal stenosis, unchanged.     L3-4: Mild broad-based disc bulge results in no  significant central  canal stenosis. There is mild bilateral foraminal narrowing,  unchanged.     L4-5: There is moderate loss of intervertebral disc height. Mild  broad-based disc/osteophyte complex results in mild central canal  stenosis, unchanged. Mild facet and ligamentum flavum hypertrophy are  also noted. There is mild bilateral left-sided foraminal narrowing  without direct nerve root impingement.     L5-S1: Mild broad-based disc protrusion and small outer annular tear  are noted. There is resultant minor narrowing of the central canal. In  combination with mild facet hypertrophy, there is mild bilateral  foraminal narrowing, without direct nerve root impingement.     IMPRESSION:  1. Mild multilevel lumbar degenerative disc and facet disease as  detailed at each individual level above. There is no evidence of  high-grade spinal stenosis or direct nerve root impingement. Findings  are similar in appearance to May 8, 2019.         Assessment:  Sacroiliac joint dysfunction of left side    Chronic left-sided low back pain without sciatica    DDD (degenerative disc disease), lumbar               PLAN:  Saima Winkler is a 51 y.o. female with chronic left-sided low back pain.  She was last seen about 1 year ago which time I performed a left SI joint injection.  This provided her near 1 year of 100% improvement in pain and function.  Over last 2 weeks, her pain has returned and is otherwise unchanged in location, duration, intensity, or characteristics.  At this time, I will repeat a left SI joint injection.  Please see procedure note for more details.  She will follow up in clinic as needed                      Abdiel Jacobo D.O.  Physical Medicine and Rehabilitation

## 2022-05-31 NOTE — PROCEDURES
Procedures     Procedure:LEFT SI joint injection with ultrasound guidance     Preprocedure diagnosis:LEFT SI joint dysfunction     Postprocedure diagnosis:  Same     Anesthetic local     Assistants:  None     Equipment used:  Riskonnect HS60 with a curvilinear transducer     Procedure in detail:  Risks and benefits were discussed and written informed consent was obtained.  The patient was placed in the prone position on the exam table.  Using ultrasound, the LEFT SI joint was visualized just caudad to the posterior superior iliac spine.  Medially, skin was cleaned with ChloraPrep and allowed to dry.  Skin tract was anesthetized using a 25 gauge 1.5 in needle in approximately 1 cc 1% lidocaine.  The needle was removed and replaced with a 3.5 in 22 gauge needle which was advanced under direct ultrasound visualization into the SI joint.  2 cc of a solution of 1 CC of 40 milligrams/milliliter of Kenalog and 2 cc of 0.50% ropivacaine was injected intraarticularly.  The needle was retracted into the SI joint ligaments for the remainder of the solution was injected superficial to inferior along the posterior sacroiliac ligaments.  Needle was removed and a Band-Aid was applied.  The patient tolerated the procedure well without any adverse events.

## 2022-06-21 ENCOUNTER — OFFICE VISIT (OUTPATIENT)
Dept: ORTHOPEDICS | Facility: CLINIC | Age: 52
End: 2022-06-21
Payer: COMMERCIAL

## 2022-06-21 VITALS — HEIGHT: 67 IN | BODY MASS INDEX: 25.9 KG/M2 | WEIGHT: 165 LBS

## 2022-06-21 DIAGNOSIS — Z98.890 HISTORY OF ARTHROSCOPY OF RIGHT KNEE: ICD-10-CM

## 2022-06-21 DIAGNOSIS — M17.11 PRIMARY OSTEOARTHRITIS OF RIGHT KNEE: ICD-10-CM

## 2022-06-21 DIAGNOSIS — M18.12 ARTHRITIS OF CARPOMETACARPAL (CMC) JOINT OF LEFT THUMB: Primary | ICD-10-CM

## 2022-06-21 PROCEDURE — 3008F BODY MASS INDEX DOCD: CPT | Mod: CPTII,S$GLB,, | Performed by: ORTHOPAEDIC SURGERY

## 2022-06-21 PROCEDURE — 1159F PR MEDICATION LIST DOCUMENTED IN MEDICAL RECORD: ICD-10-PCS | Mod: CPTII,S$GLB,, | Performed by: ORTHOPAEDIC SURGERY

## 2022-06-21 PROCEDURE — 99213 OFFICE O/P EST LOW 20 MIN: CPT | Mod: 25,S$GLB,, | Performed by: ORTHOPAEDIC SURGERY

## 2022-06-21 PROCEDURE — 1160F PR REVIEW ALL MEDS BY PRESCRIBER/CLIN PHARMACIST DOCUMENTED: ICD-10-PCS | Mod: CPTII,S$GLB,, | Performed by: ORTHOPAEDIC SURGERY

## 2022-06-21 PROCEDURE — 3008F PR BODY MASS INDEX (BMI) DOCUMENTED: ICD-10-PCS | Mod: CPTII,S$GLB,, | Performed by: ORTHOPAEDIC SURGERY

## 2022-06-21 PROCEDURE — 20600 DRAIN/INJ JOINT/BURSA W/O US: CPT | Mod: 59,LT,S$GLB, | Performed by: ORTHOPAEDIC SURGERY

## 2022-06-21 PROCEDURE — 20610 LARGE JOINT ASPIRATION/INJECTION: R KNEE: ICD-10-PCS | Mod: RT,S$GLB,, | Performed by: ORTHOPAEDIC SURGERY

## 2022-06-21 PROCEDURE — 1160F RVW MEDS BY RX/DR IN RCRD: CPT | Mod: CPTII,S$GLB,, | Performed by: ORTHOPAEDIC SURGERY

## 2022-06-21 PROCEDURE — 20600 SMALL JOINT ASPIRATION/INJECTION: L THUMB CMC: ICD-10-PCS | Mod: 59,LT,S$GLB, | Performed by: ORTHOPAEDIC SURGERY

## 2022-06-21 PROCEDURE — 20610 DRAIN/INJ JOINT/BURSA W/O US: CPT | Mod: RT,S$GLB,, | Performed by: ORTHOPAEDIC SURGERY

## 2022-06-21 PROCEDURE — 99213 PR OFFICE/OUTPT VISIT, EST, LEVL III, 20-29 MIN: ICD-10-PCS | Mod: 25,S$GLB,, | Performed by: ORTHOPAEDIC SURGERY

## 2022-06-21 PROCEDURE — 1159F MED LIST DOCD IN RCRD: CPT | Mod: CPTII,S$GLB,, | Performed by: ORTHOPAEDIC SURGERY

## 2022-06-21 RX ORDER — TRIAMCINOLONE ACETONIDE 40 MG/ML
40 INJECTION, SUSPENSION INTRA-ARTICULAR; INTRAMUSCULAR
Status: DISCONTINUED | OUTPATIENT
Start: 2022-06-21 | End: 2022-06-21 | Stop reason: HOSPADM

## 2022-06-21 RX ORDER — IBUPROFEN AND FAMOTIDINE 26.6; 8 MG/1; MG/1
1 TABLET ORAL 3 TIMES DAILY
Qty: 90 TABLET | Refills: 2 | Status: SHIPPED | OUTPATIENT
Start: 2022-06-21 | End: 2022-07-21

## 2022-06-21 RX ADMIN — TRIAMCINOLONE ACETONIDE 40 MG: 40 INJECTION, SUSPENSION INTRA-ARTICULAR; INTRAMUSCULAR at 12:06

## 2022-06-21 NOTE — PROCEDURES
Large Joint Aspiration/Injection: R knee    Date/Time: 6/21/2022 12:00 PM  Performed by: Eduar Whittington MD  Authorized by: Eduar Whittington MD     Consent Done?:  Yes (Verbal)  Indications:  Pain  Site marked: the procedure site was marked    Timeout: prior to procedure the correct patient, procedure, and site was verified    Prep: patient was prepped and draped in usual sterile fashion      Local anesthesia used?: Yes    Local anesthetic:  Lidocaine 1% without epinephrine    Details:  Needle Size:  25 G  Ultrasonic Guidance for needle placement?: No    Location:  Knee  Site:  R knee  Medications:  40 mg triamcinolone acetonide 40 mg/mL  Patient tolerance:  Patient tolerated the procedure well with no immediate complications  Small Joint Aspiration/Injection: L thumb CMC    Date/Time: 6/21/2022 12:00 PM  Performed by: Eduar Whittington MD  Authorized by: Eduar Whittington MD     Consent Done?:  Yes (Verbal)  Indications:  Pain  Site marked: the procedure site was marked    Timeout: prior to procedure the correct patient, procedure, and site was verified    Prep: patient was prepped and draped in usual sterile fashion      Local anesthesia used?: Yes    Local anesthetic:  Lidocaine 1% without epinephrine  Location:  Thumb  Site:  L thumb CMC  Ultrasonic guidance for needle placement?: No    Needle size:  25 G  Medications:  40 mg triamcinolone acetonide 40 mg/mL  Patient tolerance:  Patient tolerated the procedure well with no immediate complications

## 2022-06-21 NOTE — PROGRESS NOTES
[facility]    Subjective:     Chief Complaint:   Chief Complaint   Patient presents with    Left Hand - Pain     Left thumb injection 03/31/22, patient is here for injections, constant pain.    Right Knee - Pain     Right knee injection 03/31/22, patient is here for injections.       Past Medical History:   Diagnosis Date    Anxiety     Depression        Past Surgical History:   Procedure Laterality Date    HYSTERECTOMY      KNEE SURGERY      SHOULDER SURGERY      TRANSFORAMINAL EPIDURAL INJECTION OF STEROID Bilateral 6/4/2019    Procedure: Injection,steroid,epidural,transforaminal approach L4-5;  Surgeon: Jeyson Kent MD;  Location: [facility] OR;  Service: Pain Management;  Laterality: Bilateral;    TRANSFORAMINAL EPIDURAL INJECTION OF STEROID Bilateral 7/25/2019    Procedure: Injection,steroid,epidural,transforaminal approach;  Surgeon: Jeyson Kent MD;  Location: [facility] OR;  Service: Pain Management;  Laterality: Bilateral;  L4-5    vaginal sling         Current Outpatient Medications   Medication Sig    ADIPEX-P 37.5 mg capsule Take 37.5 mg by mouth every morning.    estradioL (ESTRACE) 1 MG tablet TAKE 1 TABLET BY MOUTH EVERY DAY    ibuprofen (ADVIL,MOTRIN) 200 MG tablet Take 200 mg by mouth every 6 (six) hours as needed for Pain. 800 mg in am, 200 at noon & 200 at bedtime    LINZESS 145 mcg Cap capsule TAKE 1 CAPSULE (145 MCG TOTAL) BY MOUTH ONCE DAILY. (Patient not taking: No sig reported)     No current facility-administered medications for this visit.       Review of patient's allergies indicates:  No Known Allergies    Family History   Problem Relation Age of Onset    Asthma Mother     Depression Mother     Hypertension Mother     Heart disease Father     Depression Father     Hypertension Father        Social History     Socioeconomic History    Marital status:     Number of children: 3   Occupational History    Occupation: Registered Nurse      Employer: [employer]     Comment: Cancer Center   Tobacco Use    Smoking status: Former Smoker     Quit date: 1/1/2007     Years since quitting: 15.4    Smokeless tobacco: Never Used   Substance and Sexual Activity    Alcohol use: Yes    Drug use: No    Sexual activity: Yes     Partners: Male   Social History Narrative    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       History of present illness:  Patient comes in today for left hand and right knee.  Her right knee is aching in bothering her.  Her left hand bothers her at the base of the thumb.  She has received injections for both these conditions in the past and it has been helpful      Review of Systems:    Constitution: Negative for chills, fever, and sweats.  Negative for unexplained weight loss.    HENT:  Negative for headaches and blurry vision.    Cardiovascular:Negative for chest pain or irregular heart beat. Negative for hypertension.    Respiratory:  Negative for cough and shortness of breath.    Gastrointestinal: Negative for abdominal pain, heartburn, melena, nausea, and vomitting.    Genitourinary:  Negative bladder incontinence and dysuria.    Musculoskeletal:  See HPI for details.     Neurological: Negative for numbness.    Psychiatric/Behavioral: Negative for depression.  The patient is not nervous/anxious.      Endocrine: Negative for polyuria    Hematologic/Lymphatic: Negative for bleeding problem.  Does not bruise/bleed easily.    Skin: Negative for poor would healing and rash    Objective:      Physical Examination:    Vital Signs:  There were no vitals filed for this visit.    Body mass index is 25.84 kg/m².    This a well-developed, well nourished patient in no acute distress.  They are alert and oriented and cooperative to examination.        Patient has full range of motion of the right and left knees.  She does have some anterior crepitus.  Her knee is stable to varus valgus stresses.  She is neurovascular intact to sensory  testing.  She has significant discomfort with basilar grind.  Pertinent New Results:    XRAY Report / Interpretation:       Assessment/Plan:      Basilar arthritis.  Left hand.    Right knee osteoarthritis.  I injected both the knee and the thumb with Kenalog and lidocaine.  She will follow-up p.r.n.  This note was created using Dragon voice recognition software that occasionally misinterpreted phrases or words.

## 2022-07-28 ENCOUNTER — OFFICE VISIT (OUTPATIENT)
Dept: FAMILY MEDICINE | Facility: CLINIC | Age: 52
End: 2022-07-28
Payer: COMMERCIAL

## 2022-07-28 VITALS
DIASTOLIC BLOOD PRESSURE: 87 MMHG | HEIGHT: 67 IN | SYSTOLIC BLOOD PRESSURE: 134 MMHG | WEIGHT: 166 LBS | BODY MASS INDEX: 26.06 KG/M2 | HEART RATE: 86 BPM

## 2022-07-28 DIAGNOSIS — R53.83 OTHER FATIGUE: ICD-10-CM

## 2022-07-28 DIAGNOSIS — Z11.4 SCREENING FOR HUMAN IMMUNODEFICIENCY VIRUS: ICD-10-CM

## 2022-07-28 DIAGNOSIS — N95.1 HOT FLASHES DUE TO MENOPAUSE: Primary | Chronic | ICD-10-CM

## 2022-07-28 DIAGNOSIS — Z00.00 ANNUAL PHYSICAL EXAM: Primary | ICD-10-CM

## 2022-07-28 DIAGNOSIS — K59.04 CHRONIC IDIOPATHIC CONSTIPATION: ICD-10-CM

## 2022-07-28 DIAGNOSIS — Z11.59 NEED FOR HEPATITIS C SCREENING TEST: ICD-10-CM

## 2022-07-28 PROCEDURE — 3079F PR MOST RECENT DIASTOLIC BLOOD PRESSURE 80-89 MM HG: ICD-10-PCS | Mod: CPTII,S$GLB,, | Performed by: INTERNAL MEDICINE

## 2022-07-28 PROCEDURE — 3075F SYST BP GE 130 - 139MM HG: CPT | Mod: CPTII,S$GLB,, | Performed by: INTERNAL MEDICINE

## 2022-07-28 PROCEDURE — 3079F DIAST BP 80-89 MM HG: CPT | Mod: CPTII,S$GLB,, | Performed by: INTERNAL MEDICINE

## 2022-07-28 PROCEDURE — 1160F PR REVIEW ALL MEDS BY PRESCRIBER/CLIN PHARMACIST DOCUMENTED: ICD-10-PCS | Mod: CPTII,S$GLB,, | Performed by: INTERNAL MEDICINE

## 2022-07-28 PROCEDURE — 99213 OFFICE O/P EST LOW 20 MIN: CPT | Mod: S$GLB,,, | Performed by: INTERNAL MEDICINE

## 2022-07-28 PROCEDURE — 1160F RVW MEDS BY RX/DR IN RCRD: CPT | Mod: CPTII,S$GLB,, | Performed by: INTERNAL MEDICINE

## 2022-07-28 PROCEDURE — 3008F BODY MASS INDEX DOCD: CPT | Mod: CPTII,S$GLB,, | Performed by: INTERNAL MEDICINE

## 2022-07-28 PROCEDURE — 1159F PR MEDICATION LIST DOCUMENTED IN MEDICAL RECORD: ICD-10-PCS | Mod: CPTII,S$GLB,, | Performed by: INTERNAL MEDICINE

## 2022-07-28 PROCEDURE — 99213 PR OFFICE/OUTPT VISIT, EST, LEVL III, 20-29 MIN: ICD-10-PCS | Mod: S$GLB,,, | Performed by: INTERNAL MEDICINE

## 2022-07-28 PROCEDURE — 1159F MED LIST DOCD IN RCRD: CPT | Mod: CPTII,S$GLB,, | Performed by: INTERNAL MEDICINE

## 2022-07-28 PROCEDURE — 3008F PR BODY MASS INDEX (BMI) DOCUMENTED: ICD-10-PCS | Mod: CPTII,S$GLB,, | Performed by: INTERNAL MEDICINE

## 2022-07-28 PROCEDURE — 3075F PR MOST RECENT SYSTOLIC BLOOD PRESS GE 130-139MM HG: ICD-10-PCS | Mod: CPTII,S$GLB,, | Performed by: INTERNAL MEDICINE

## 2022-07-28 RX ORDER — ESTRADIOL 1 MG/1
1 TABLET ORAL DAILY
Qty: 90 TABLET | Refills: 0 | Status: SHIPPED | OUTPATIENT
Start: 2022-07-28 | End: 2022-10-27

## 2022-07-28 RX ORDER — CETIRIZINE HYDROCHLORIDE 10 MG/1
10 TABLET ORAL DAILY
COMMUNITY

## 2022-07-28 NOTE — PROGRESS NOTES
Subjective:       Patient ID: Saima Winkler is a 52 y.o. female.    Chief Complaint: Menopause (Hot flashes) and Fatigue    Miss Landaverde is a pleasant 52-year-old  female who comes for follow-up.  This follow-up is after long gap and interval.    Her issues are as below:-    1.-hot flashes and being currently on HRT.  She has a staggered and delayed appointment with gynecologist and would like to have 90 days of prescription for estradiol.  She does plan to wean off this medication slowly and steadily over a period of time.  This helps her with hot flashes.    2.-chronic constipation and previously she was prescribed Linzess which did seem to help her.  Unfortunately it seem to cause him more diarrhea than benefit.  She has stop taking it and she is currently stable on over-the-counter supplements and sometimes magnesium supplements which keeps her acceptably regular.    3.-complains of chronic knee pain on the right side especially around the kneecap.  She had a meniscus surgery in past.  She has been told that she has arthritis and cartilage problems and may need some surgical intervention in future.    4.-recently for some reason she checked her cortisol levels from home kit and she found that the levels were low.  Perhaps some fatigue but nothing great.  No low blood pressures.  No fainting spells.      Past Medical History:   Diagnosis Date    Anxiety     Depression      Social History     Socioeconomic History    Marital status:     Number of children: 3   Occupational History    Occupation: Registered Nurse      Employer: [employer]     Comment: Cancer Center   Tobacco Use    Smoking status: Former Smoker     Quit date: 1/1/2007     Years since quitting: 15.5    Smokeless tobacco: Never Used   Substance and Sexual Activity    Alcohol use: Yes    Drug use: No    Sexual activity: Yes     Partners: Male   Social History Narrative    She works at the cancer center at  [employer].     Past Surgical History:   Procedure Laterality Date    HYSTERECTOMY      KNEE SURGERY      SHOULDER SURGERY      TRANSFORAMINAL EPIDURAL INJECTION OF STEROID Bilateral 6/4/2019    Procedure: Injection,steroid,epidural,transforaminal approach L4-5;  Surgeon: Jeyson Kent MD;  Location: [facility] OR;  Service: Pain Management;  Laterality: Bilateral;    TRANSFORAMINAL EPIDURAL INJECTION OF STEROID Bilateral 7/25/2019    Procedure: Injection,steroid,epidural,transforaminal approach;  Surgeon: Jeyson Kent MD;  Location: [facility] OR;  Service: Pain Management;  Laterality: Bilateral;  L4-5    vaginal sling       Family History   Problem Relation Age of Onset    Asthma Mother     Depression Mother     Hypertension Mother     Heart disease Father     Depression Father     Hypertension Father        Review of Systems   Constitutional: Positive for activity change (Because of knee pain.) and fatigue (Mild fatigue if at all.). Negative for chills, diaphoresis, fever and unexpected weight change.        2 X Covid infections in past    HENT: Negative for congestion, hearing loss, rhinorrhea and trouble swallowing.    Eyes: Negative for discharge, redness and visual disturbance.   Respiratory: Negative for apnea, cough, chest tightness and wheezing.    Cardiovascular: Negative for chest pain, palpitations and leg swelling.   Gastrointestinal: Positive for constipation. Negative for abdominal distention, abdominal pain, blood in stool, diarrhea and vomiting.        She takes magnesium supplements which do help.  Linzess apparently caused her dehydration at the dosage she was taking.  She stopped using it.   Endocrine: Negative for cold intolerance, polydipsia and polyuria.        Patient does get hot flashes and menopausal symptoms.  Currently on estradiol 1 mg and would consider weaning it off in future.  Mild fatigue.  She had checked home kit for cortisol level which was apparently low.  "  Genitourinary: Negative for difficulty urinating, dyspareunia, dysuria, hematuria and menstrual problem.   Musculoskeletal: Positive for arthralgias and joint swelling. Negative for neck pain.        Chronic knee pains and arthritis continue to afflicted her.  Mostly on the right anterior knee.   Skin: Negative for color change, rash and wound.   Neurological: Negative for syncope, weakness and headaches.   Psychiatric/Behavioral: Negative for agitation, confusion and dysphoric mood.        Stress and anxiety is better         Objective:      Blood pressure 134/87, pulse 86, height 5' 7" (1.702 m), weight 75.3 kg (166 lb). Body mass index is 26 kg/m².  Physical Exam  Exam conducted with a chaperone present (Cinthia CHASE).   Constitutional:       General: She is not in acute distress.     Appearance: Normal appearance. She is not ill-appearing, toxic-appearing or diaphoretic.      Comments: BMI is 26.00   Eyes:      General: No scleral icterus.  Cardiovascular:      Rate and Rhythm: Normal rate and regular rhythm.      Pulses: Normal pulses.   Pulmonary:      Effort: No respiratory distress.      Breath sounds: No stridor.   Abdominal:      General: There is no distension.      Palpations: There is no mass.      Tenderness: There is no abdominal tenderness.   Musculoskeletal:         General: No swelling.      Right lower leg: No edema.      Left lower leg: No edema.      Comments: Changes of osteoarthritis in the knees.  No swelling in the calf muscles.  No tenderness in the calf muscles.  João sign is negative.   Skin:     Coloration: Skin is not jaundiced or pale.   Neurological:      Mental Status: She is alert. Mental status is at baseline.      Cranial Nerves: No cranial nerve deficit.      Sensory: No sensory deficit.   Psychiatric:         Behavior: Behavior normal.      Comments: Perhaps slightly anhedonic and anxious.           Assessment:       1. Hot flashes due to menopause    2. Other fatigue    3. " Chronic idiopathic constipation    4. Need for hepatitis C screening test    5. Screening for human immunodeficiency virus           No visits with results within 3 Month(s) from this visit.   Latest known visit with results is:   Orders Only on 06/24/2020   Component Date Value Ref Range Status    WBC 06/24/2020 6.1  3.8 - 10.8 Thousand/uL Final    RBC 06/24/2020 4.31  3.80 - 5.10 Million/uL Final    Hemoglobin 06/24/2020 13.1  11.7 - 15.5 g/dL Final    Hematocrit 06/24/2020 39.1  35.0 - 45.0 % Final    MCV 06/24/2020 90.7  80.0 - 100.0 fL Final    MCH 06/24/2020 30.4  27.0 - 33.0 pg Final    MCHC 06/24/2020 33.5  32.0 - 36.0 g/dL Final    RDW 06/24/2020 12.4  11.0 - 15.0 % Final    Platelets 06/24/2020 260  140 - 400 Thousand/uL Final    MPV 06/24/2020 10.1  7.5 - 12.5 fL Final    Neutrophils, Abs 06/24/2020 2,318  1,500 - 7,800 cells/uL Final    Lymph # 06/24/2020 3,056  850 - 3,900 cells/uL Final    Mono # 06/24/2020 488  200 - 950 cells/uL Final    Eos # 06/24/2020 159  15 - 500 cells/uL Final    Baso # 06/24/2020 79  0 - 200 cells/uL Final    Neutrophils Relative 06/24/2020 38  % Final    Lymph % 06/24/2020 50.1  % Final    Mono % 06/24/2020 8.0  % Final    Eosinophil % 06/24/2020 2.6  % Final    Basophil % 06/24/2020 1.3  % Final         Plan:           Hot flashes due to menopause  Comments:  Patient is currently continuing on estradiol 1 mg. She may be slowly planning to wean off the medication in future.  Orders:  -     estradioL (ESTRACE) 1 MG tablet; Take 1 tablet (1 mg total) by mouth once daily.  Dispense: 90 tablet; Refill: 0    Other fatigue  Comments:  Some degree of fatigue.  Patient checked her cortisol level at home but it was not a.m..  She found to be low.  I will check an official l test.  Orders:  -     Cortisol, 8AM; Future; Expected date: 07/29/2022    Chronic idiopathic constipation  Comments:  Previously used to be on Linzess which apparently caused her more diarrhea  than benefit.  Currently takes magnesium supplements and sometimes who OTC stool    Need for hepatitis C screening test  -     Hepatitis C antibody; Future; Expected date: 07/29/2022    Screening for human immunodeficiency virus  -     HIV 1/2 Ag/Ab (4th Gen); Future; Expected date: 07/29/2022    Patient's medical issues have been reviewed.    Underlying hot flashes menopausal symptoms have been reviewed and she will be given a refill for estradiol.  She should get her regular gynecological checkup and mammograms.  There seems to be no family history of ovarian or colon cancers.    She did check herself for home cortisol test with the kit and apparently it was low.  I doubt and I do not find any reason that her cortisol level should be low with no significant low blood pressures.  Perhaps some fatigue because of low endurance and lack of exercise because of right knee pain.    I will check a.m. cortisol level.    Constipation has been noted which is treated with combination of over-the-counter stool softeners and magnesium supplements.  Linzess apparently cause more problems than benefits.  She did also get shingles vaccine and I have asked her to forward us the proof of the shingles vaccine.  She is updated on mammogram.  She did have a tetanus vaccination in 2018. She does get flu shots and ever system documents till 2020.    Please continue with COVID precautions.    Labs for lipid panel, A1c test and TSH will be sent separately title physical examination.    She will follow-up about in 6 months time for annual physical.  Earlier if needed.  She will be notified about the labs.      Current Outpatient Medications:     cetirizine (ZYRTEC) 10 MG tablet, Take 10 mg by mouth once daily., Disp: , Rfl:     ibuprofen (ADVIL,MOTRIN) 200 MG tablet, Take 200 mg by mouth every 6 (six) hours as needed for Pain. 800 mg in am, 200 at noon & 200 at bedtime, Disp: , Rfl:     estradioL (ESTRACE) 1 MG tablet, Take 1 tablet (1  mg total) by mouth once daily., Disp: 90 tablet, Rfl: 0

## 2022-08-02 ENCOUNTER — OFFICE VISIT (OUTPATIENT)
Dept: ORTHOPEDICS | Facility: CLINIC | Age: 52
End: 2022-08-02
Payer: COMMERCIAL

## 2022-08-02 ENCOUNTER — PATIENT MESSAGE (OUTPATIENT)
Dept: ORTHOPEDICS | Facility: CLINIC | Age: 52
End: 2022-08-02
Payer: COMMERCIAL

## 2022-08-02 VITALS — HEIGHT: 67 IN | WEIGHT: 166 LBS | BODY MASS INDEX: 26.06 KG/M2

## 2022-08-02 DIAGNOSIS — Z98.890 HISTORY OF ARTHROSCOPY OF RIGHT KNEE: ICD-10-CM

## 2022-08-02 DIAGNOSIS — M17.11 PRIMARY OSTEOARTHRITIS OF RIGHT KNEE: Primary | ICD-10-CM

## 2022-08-02 DIAGNOSIS — M17.12 PRIMARY OSTEOARTHRITIS OF LEFT KNEE: ICD-10-CM

## 2022-08-02 PROCEDURE — 99213 PR OFFICE/OUTPT VISIT, EST, LEVL III, 20-29 MIN: ICD-10-PCS | Mod: S$GLB,,, | Performed by: ORTHOPAEDIC SURGERY

## 2022-08-02 PROCEDURE — 1160F PR REVIEW ALL MEDS BY PRESCRIBER/CLIN PHARMACIST DOCUMENTED: ICD-10-PCS | Mod: CPTII,S$GLB,, | Performed by: ORTHOPAEDIC SURGERY

## 2022-08-02 PROCEDURE — 1159F MED LIST DOCD IN RCRD: CPT | Mod: CPTII,S$GLB,, | Performed by: ORTHOPAEDIC SURGERY

## 2022-08-02 PROCEDURE — 3008F BODY MASS INDEX DOCD: CPT | Mod: CPTII,S$GLB,, | Performed by: ORTHOPAEDIC SURGERY

## 2022-08-02 PROCEDURE — 3008F PR BODY MASS INDEX (BMI) DOCUMENTED: ICD-10-PCS | Mod: CPTII,S$GLB,, | Performed by: ORTHOPAEDIC SURGERY

## 2022-08-02 PROCEDURE — 1160F RVW MEDS BY RX/DR IN RCRD: CPT | Mod: CPTII,S$GLB,, | Performed by: ORTHOPAEDIC SURGERY

## 2022-08-02 PROCEDURE — 1159F PR MEDICATION LIST DOCUMENTED IN MEDICAL RECORD: ICD-10-PCS | Mod: CPTII,S$GLB,, | Performed by: ORTHOPAEDIC SURGERY

## 2022-08-02 PROCEDURE — 99213 OFFICE O/P EST LOW 20 MIN: CPT | Mod: S$GLB,,, | Performed by: ORTHOPAEDIC SURGERY

## 2022-08-02 RX ORDER — TRAMADOL HYDROCHLORIDE 50 MG/1
50 TABLET ORAL EVERY 6 HOURS PRN
Qty: 28 TABLET | Refills: 0 | Status: SHIPPED | OUTPATIENT
Start: 2022-08-02 | End: 2022-08-09

## 2022-08-02 NOTE — PROGRESS NOTES
[facility]    Subjective:     Chief Complaint:   Chief Complaint   Patient presents with    Right Knee - Pain, Swelling     Right knee pain and swelling following inj 06/21/22.       Past Medical History:   Diagnosis Date    Anxiety     Depression        Past Surgical History:   Procedure Laterality Date    HYSTERECTOMY      KNEE SURGERY      SHOULDER SURGERY      TRANSFORAMINAL EPIDURAL INJECTION OF STEROID Bilateral 6/4/2019    Procedure: Injection,steroid,epidural,transforaminal approach L4-5;  Surgeon: Jeyson Kent MD;  Location: [facility] OR;  Service: Pain Management;  Laterality: Bilateral;    TRANSFORAMINAL EPIDURAL INJECTION OF STEROID Bilateral 7/25/2019    Procedure: Injection,steroid,epidural,transforaminal approach;  Surgeon: Jeyson Kent MD;  Location: [facility] OR;  Service: Pain Management;  Laterality: Bilateral;  L4-5    vaginal sling         Current Outpatient Medications   Medication Sig    cetirizine (ZYRTEC) 10 MG tablet Take 10 mg by mouth once daily.    estradioL (ESTRACE) 1 MG tablet Take 1 tablet (1 mg total) by mouth once daily.    ibuprofen (ADVIL,MOTRIN) 200 MG tablet Take 200 mg by mouth every 6 (six) hours as needed for Pain. 800 mg in am, 200 at noon & 200 at bedtime     No current facility-administered medications for this visit.       Review of patient's allergies indicates:  No Known Allergies    Family History   Problem Relation Age of Onset    Asthma Mother     Depression Mother     Hypertension Mother     Heart disease Father     Depression Father     Hypertension Father        Social History     Socioeconomic History    Marital status:     Number of children: 3   Occupational History    Occupation: Registered Nurse      Employer: [employer]     Comment: Cancer Center   Tobacco Use    Smoking status: Former Smoker     Quit date: 1/1/2007     Years since quitting: 15.5    Smokeless tobacco: Never Used   Substance and Sexual Activity     Alcohol use: Yes    Drug use: No    Sexual activity: Yes     Partners: Male   Social History Narrative    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       History of present illness:  Patient comes in today for her right knee.  She is miserable.  She really can not function knee.  She had a meniscectomy in 2005.  At that time she had some arthritic changes.  She has done well through the years.  Now the Kenalog injection simply are not working.  She is having daily discomfort.  She says when she gets home from work she really can not even walk.  She says sometime she is actually having trouble driving.      Review of Systems:    Constitution: Negative for chills, fever, and sweats.  Negative for unexplained weight loss.    HENT:  Negative for headaches and blurry vision.    Cardiovascular:Negative for chest pain or irregular heart beat. Negative for hypertension.    Respiratory:  Negative for cough and shortness of breath.    Gastrointestinal: Negative for abdominal pain, heartburn, melena, nausea, and vomitting.    Genitourinary:  Negative bladder incontinence and dysuria.    Musculoskeletal:  See HPI for details.     Neurological: Negative for numbness.    Psychiatric/Behavioral: Negative for depression.  The patient is not nervous/anxious.      Endocrine: Negative for polyuria    Hematologic/Lymphatic: Negative for bleeding problem.  Does not bruise/bleed easily.    Skin: Negative for poor would healing and rash    Objective:      Physical Examination:    Vital Signs:  There were no vitals filed for this visit.    Body mass index is 26 kg/m².    This a well-developed, well nourished patient in no acute distress.  They are alert and oriented and cooperative to examination.        Patient has range of motion 0-125 degrees she has a 2+ effusion.  She has a lot of medial joint line tenderness she has a lot of crepitus.  She has crepitus on the left knee.  No effusion.  She does have joint line  tenderness  Pertinent New Results:    XRAY Report / Interpretation:   Three views of the bilateral knees are reviewed.  She has significant loss the medial compartment.    Assessment/Plan:      Advanced arthritis of the right knee.  She has post meniscectomy end-stage arthritis.  She has failed extensive conservative care including Kenalog injections activity modification nonsteroidal and strengthening.  This has worked for many years but unfortunately now has failed.  She has daily intractable discomfort.  I have offered her a right total knee.  We spoke about the need for revision in the future given her young age.  We spoke about infection DVT loosening pain numbness and other complications.  She understood and wished to proceed      This note was created using Dragon voice recognition software that occasionally misinterpreted phrases or words.

## 2022-08-05 DIAGNOSIS — M17.11 PRIMARY OSTEOARTHRITIS OF RIGHT KNEE: Primary | ICD-10-CM

## 2022-08-06 ENCOUNTER — PATIENT MESSAGE (OUTPATIENT)
Dept: SURGERY | Facility: HOSPITAL | Age: 52
End: 2022-08-06
Payer: COMMERCIAL

## 2022-08-08 ENCOUNTER — TELEPHONE (OUTPATIENT)
Dept: ORTHOPEDICS | Facility: CLINIC | Age: 52
End: 2022-08-08

## 2022-08-08 DIAGNOSIS — M17.11 PRIMARY OSTEOARTHRITIS OF RIGHT KNEE: Primary | ICD-10-CM

## 2022-08-08 RX ORDER — HYDROCODONE BITARTRATE AND ACETAMINOPHEN 5; 325 MG/1; MG/1
1 TABLET ORAL NIGHTLY PRN
Qty: 7 TABLET | Refills: 0 | Status: SHIPPED | OUTPATIENT
Start: 2022-08-08 | End: 2022-08-15 | Stop reason: SDUPTHER

## 2022-08-08 NOTE — TELEPHONE ENCOUNTER
I sent in an electronic prescription for Norco 5 mg to be taken by mouth at bedtime.  This is for use in conjunction with tramadol which will be used for daytime pain.  Patient is pending a total knee arthroplasty within the next month.

## 2022-08-15 DIAGNOSIS — Z00.00 ANNUAL PHYSICAL EXAM: ICD-10-CM

## 2022-08-15 DIAGNOSIS — Z11.59 NEED FOR HEPATITIS C SCREENING TEST: ICD-10-CM

## 2022-08-15 DIAGNOSIS — Z11.4 SCREENING FOR HUMAN IMMUNODEFICIENCY VIRUS: ICD-10-CM

## 2022-08-15 DIAGNOSIS — R53.83 OTHER FATIGUE: Primary | ICD-10-CM

## 2022-08-15 NOTE — PROGRESS NOTES
Other fatigue  -     Cortisol, 8AM; Future; Expected date: 08/15/2022    Annual physical exam  -     TSH; Future; Expected date: 08/15/2022  -     CBC Auto Differential; Future; Expected date: 08/15/2022  -     Hemoglobin A1C; Future; Expected date: 08/15/2022  -     Lipid Panel; Future; Expected date: 08/15/2022    Need for hepatitis C screening test  -     Hepatitis C Antibody; Future; Expected date: 08/15/2022    Screening for human immunodeficiency virus  -     HIV 1/2 Ag/Ab (4th Gen); Future; Expected date: 08/16/2022

## 2022-08-22 ENCOUNTER — PATIENT MESSAGE (OUTPATIENT)
Dept: FAMILY MEDICINE | Facility: CLINIC | Age: 52
End: 2022-08-22

## 2022-08-22 ENCOUNTER — LAB VISIT (OUTPATIENT)
Dept: LAB | Facility: HOSPITAL | Age: 52
End: 2022-08-22
Attending: INTERNAL MEDICINE
Payer: COMMERCIAL

## 2022-08-22 DIAGNOSIS — R53.83 OTHER FATIGUE: ICD-10-CM

## 2022-08-22 DIAGNOSIS — Z11.59 NEED FOR HEPATITIS C SCREENING TEST: ICD-10-CM

## 2022-08-22 DIAGNOSIS — Z11.4 SCREENING FOR HUMAN IMMUNODEFICIENCY VIRUS: ICD-10-CM

## 2022-08-22 LAB — CORTIS SERPL-MCNC: 6.6 UG/DL (ref 4.3–22.4)

## 2022-08-22 PROCEDURE — 87389 HIV-1 AG W/HIV-1&-2 AB AG IA: CPT | Performed by: INTERNAL MEDICINE

## 2022-08-22 PROCEDURE — 82533 TOTAL CORTISOL: CPT | Performed by: INTERNAL MEDICINE

## 2022-08-22 PROCEDURE — 86803 HEPATITIS C AB TEST: CPT | Performed by: INTERNAL MEDICINE

## 2022-08-22 PROCEDURE — 36415 COLL VENOUS BLD VENIPUNCTURE: CPT | Performed by: INTERNAL MEDICINE

## 2022-08-23 LAB
HCV AB S/CO SERPL IA: <0.1 S/CO RATIO (ref 0–0.9)
HIV 1+2 AB+HIV1 P24 AG SERPL QL IA: NON REACTIVE

## 2022-08-30 ENCOUNTER — PATIENT MESSAGE (OUTPATIENT)
Dept: SURGERY | Facility: HOSPITAL | Age: 52
End: 2022-08-30
Payer: COMMERCIAL

## 2022-08-30 DIAGNOSIS — M17.11 PRIMARY OSTEOARTHRITIS OF RIGHT KNEE: ICD-10-CM

## 2022-08-30 RX ORDER — HYDROCODONE BITARTRATE AND ACETAMINOPHEN 5; 325 MG/1; MG/1
1 TABLET ORAL NIGHTLY PRN
Qty: 7 TABLET | Refills: 0 | Status: SHIPPED | OUTPATIENT
Start: 2022-08-30 | End: 2022-09-06

## 2022-08-30 NOTE — TELEPHONE ENCOUNTER
Electronically prescribed a prescription for Norco 5 mg with instructions to take 1 tablet by mouth every daily hours as needed for pain.  I prescribed quantity 7.  Patient is approximately 1.    Patient scheduled for total knee arthroplasty in a couple of weeks.  She is miserable, and we understand this however we are being judicious in the use of narcotic pain medications in an effort to prevent her from becoming opioid tolerant and having difficulty controlling her pain postoperatively.

## 2022-09-05 ENCOUNTER — PATIENT MESSAGE (OUTPATIENT)
Dept: SURGERY | Facility: HOSPITAL | Age: 52
End: 2022-09-05
Payer: COMMERCIAL

## 2022-09-07 ENCOUNTER — HOSPITAL ENCOUNTER (OUTPATIENT)
Dept: RADIOLOGY | Facility: HOSPITAL | Age: 52
Discharge: HOME | End: 2022-09-07
Attending: ORTHOPAEDIC SURGERY
Payer: COMMERCIAL

## 2022-09-07 ENCOUNTER — HOSPITAL ENCOUNTER (OUTPATIENT)
Dept: PREADMISSION TESTING | Facility: HOSPITAL | Age: 52
Discharge: HOME | End: 2022-09-07
Attending: ORTHOPAEDIC SURGERY
Payer: COMMERCIAL

## 2022-09-07 VITALS — WEIGHT: 163 LBS | HEIGHT: 67 IN | BODY MASS INDEX: 25.58 KG/M2

## 2022-09-07 DIAGNOSIS — Z01.818 PRE-OPERATIVE EXAM: Primary | ICD-10-CM

## 2022-09-07 DIAGNOSIS — M17.11 PRIMARY OSTEOARTHRITIS OF RIGHT KNEE: ICD-10-CM

## 2022-09-07 DIAGNOSIS — M17.11 PRIMARY OSTEOARTHRITIS OF RIGHT KNEE: Primary | ICD-10-CM

## 2022-09-07 LAB
ABO + RH BLD: NORMAL
ALBUMIN SERPL BCP-MCNC: 4 G/DL (ref 3.5–5.2)
ALP SERPL-CCNC: 52 U/L (ref 55–135)
ALT SERPL W/O P-5'-P-CCNC: 18 U/L (ref 10–44)
ANION GAP SERPL CALC-SCNC: 9 MMOL/L (ref 8–16)
AST SERPL-CCNC: 22 U/L (ref 10–40)
BASOPHILS # BLD AUTO: 0.07 K/UL (ref 0–0.2)
BASOPHILS NFR BLD: 0.8 % (ref 0–1.9)
BILIRUB SERPL-MCNC: 0.5 MG/DL (ref 0.1–1)
BLD GP AB SCN CELLS X3 SERPL QL: NORMAL
BUN SERPL-MCNC: 20 MG/DL (ref 6–20)
CALCIUM SERPL-MCNC: 9.3 MG/DL (ref 8.7–10.5)
CHLORIDE SERPL-SCNC: 103 MMOL/L (ref 95–110)
CO2 SERPL-SCNC: 25 MMOL/L (ref 23–29)
CREAT SERPL-MCNC: 0.7 MG/DL (ref 0.5–1.4)
DIFFERENTIAL METHOD: ABNORMAL
EOSINOPHIL # BLD AUTO: 0.1 K/UL (ref 0–0.5)
EOSINOPHIL NFR BLD: 1.4 % (ref 0–8)
ERYTHROCYTE [DISTWIDTH] IN BLOOD BY AUTOMATED COUNT: 12.3 % (ref 11.5–14.5)
EST. GFR  (NO RACE VARIABLE): >60 ML/MIN/1.73 M^2
GLUCOSE SERPL-MCNC: 101 MG/DL (ref 70–110)
HCT VFR BLD AUTO: 36.8 % (ref 37–48.5)
HGB BLD-MCNC: 12.2 G/DL (ref 12–16)
IMM GRANULOCYTES # BLD AUTO: 0.01 K/UL (ref 0–0.04)
IMM GRANULOCYTES NFR BLD AUTO: 0.1 % (ref 0–0.5)
LYMPHOCYTES # BLD AUTO: 3.2 K/UL (ref 1–4.8)
LYMPHOCYTES NFR BLD: 37.5 % (ref 18–48)
MCH RBC QN AUTO: 30.9 PG (ref 27–31)
MCHC RBC AUTO-ENTMCNC: 33.2 G/DL (ref 32–36)
MCV RBC AUTO: 93 FL (ref 82–98)
MONOCYTES # BLD AUTO: 0.9 K/UL (ref 0.3–1)
MONOCYTES NFR BLD: 9.9 % (ref 4–15)
NEUTROPHILS # BLD AUTO: 4.3 K/UL (ref 1.8–7.7)
NEUTROPHILS NFR BLD: 50.3 % (ref 38–73)
NRBC BLD-RTO: 0 /100 WBC
PLATELET # BLD AUTO: 280 K/UL (ref 150–450)
PMV BLD AUTO: 9.4 FL (ref 9.2–12.9)
POTASSIUM SERPL-SCNC: 5.1 MMOL/L (ref 3.5–5.1)
PROT SERPL-MCNC: 6.9 G/DL (ref 6–8.4)
RBC # BLD AUTO: 3.95 M/UL (ref 4–5.4)
SODIUM SERPL-SCNC: 137 MMOL/L (ref 136–145)
WBC # BLD AUTO: 8.55 K/UL (ref 3.9–12.7)

## 2022-09-07 PROCEDURE — 36415 COLL VENOUS BLD VENIPUNCTURE: CPT | Performed by: ORTHOPAEDIC SURGERY

## 2022-09-07 PROCEDURE — 73700 CT KNEE WITHOUT CONTRAST RIGHT W/MAKO PROTOCOL: ICD-10-PCS | Mod: 26,RT,, | Performed by: RADIOLOGY

## 2022-09-07 PROCEDURE — 93010 ELECTROCARDIOGRAM REPORT: CPT | Mod: ,,, | Performed by: INTERNAL MEDICINE

## 2022-09-07 PROCEDURE — 93005 ELECTROCARDIOGRAM TRACING: CPT

## 2022-09-07 PROCEDURE — 71046 XR CHEST PA AND LATERAL: ICD-10-PCS | Mod: 26,,, | Performed by: RADIOLOGY

## 2022-09-07 PROCEDURE — 99900103 DSU ONLY-NO CHARGE-INITIAL HR (STAT)

## 2022-09-07 PROCEDURE — 85025 COMPLETE CBC W/AUTO DIFF WBC: CPT | Performed by: ORTHOPAEDIC SURGERY

## 2022-09-07 PROCEDURE — 71046 X-RAY EXAM CHEST 2 VIEWS: CPT | Mod: TC,FY

## 2022-09-07 PROCEDURE — 93010 EKG 12-LEAD: ICD-10-PCS | Mod: ,,, | Performed by: INTERNAL MEDICINE

## 2022-09-07 PROCEDURE — 73700 CT LOWER EXTREMITY W/O DYE: CPT | Mod: 26,RT,, | Performed by: RADIOLOGY

## 2022-09-07 PROCEDURE — 86850 RBC ANTIBODY SCREEN: CPT | Performed by: ORTHOPAEDIC SURGERY

## 2022-09-07 PROCEDURE — 99900104 DSU ONLY-NO CHARGE-EA ADD'L HR (STAT)

## 2022-09-07 PROCEDURE — 71046 X-RAY EXAM CHEST 2 VIEWS: CPT | Mod: 26,,, | Performed by: RADIOLOGY

## 2022-09-07 PROCEDURE — 80053 COMPREHEN METABOLIC PANEL: CPT | Performed by: ORTHOPAEDIC SURGERY

## 2022-09-07 PROCEDURE — 73700 CT LOWER EXTREMITY W/O DYE: CPT | Mod: TC,RT

## 2022-09-07 RX ORDER — HYDROCODONE BITARTRATE AND ACETAMINOPHEN 5; 325 MG/1; MG/1
1 TABLET ORAL NIGHTLY PRN
Qty: 7 TABLET | Refills: 0 | Status: CANCELLED | OUTPATIENT
Start: 2022-09-07 | End: 2022-09-14

## 2022-09-07 NOTE — PRE ADMISSION SCREENING
Patient Name: Saima Winkler  YOB: 1970   MRN: [id number]     [facility]   Basic Mobility Inpatient Short Form 6 Clicks         How much difficulty does the patient currently have  Unable  A Lot  A Little  None      1. Turning over in bed (including adjusting bedclothes, sheets and blankets)?     1 []    2 []    3 []    4 [x]        2. Sitting down on and standing up from a chair with arms (e.g., wheelchair, bedside commode, etc.)     1 []  2 []  3 []     4 [x]      3. Moving from lying on back to sitting on the side of the bed?     1 []  2 []  3 []    4 [x]    How much help from another person does the patient currently need  Total  A Lot  A Little  None      4. Moving to and from a bed to a chair (including a wheelchair)?    1 []  2 []  3 []    4 [x]      5. Need to walk in hospital room?    1 []  2 []  3 []    4 [x]      6. Climbing 3-5 steps with a railing?    1 []  2 []  3 []    4 [x]       Raw Score:       24           CMS 0-100% Score:      0      %   Standardized Score:   61.14            CMS Modifier:       [facility] AM-PAC   Basic Mobility Inpatient Short Form 6 Clicks Score Conversion Table*         *Use this form to convert -PAC Basic Mobility Inpatient Raw Scores.   [facility] Inpatient Basic Mobility Short Form Scoring Example   1. Add the number values associated with the response to each item. For example, items totals yield a Raw Score of 21.   2. Match the raw score to the t-Scale scores (t-Scale score = 50.25, SE = 4.69).   3. Find the associated CMS % (CMS % = 28.97%).   4. Locate the correct CMS Functional Modifier Code, or G Code (G code = CJ)     NOTE: Each -PAC Short Form has a separate conversion table. Make sure that you use the correct conversion table.       Instruction Manual - page 45 contains conversion table

## 2022-09-07 NOTE — PRE ADMISSION SCREENING
JOINT CAMP ASSESSMENT    Name Saima Winkler   MRN [id number]    Age/Sex 52 y.o. female    Surgeon Dr. Witt Finger   Joint Camp Date 9/7/2022   Surgery Date 9/19/2022   Procedure Right Knee Arthroplasty   Insurance Payor: [facility] / Plan: [facility] FEDERAL STANDARD / Product Type: PPO /    Care Team Patient Care Team:  Gerardo Buchanan MD as PCP - General (Internal Medicine)  Tanisha Carter MD as Consulting Physician (Psychiatry)    Pharmacy   [id number] - [place], [place] - [address]  [address]  [place] [address]  Phone: [phone] Fax: [phone]    [facility] [place] [facility] - [place], [place] - [address]  [address]  [facility] [address]  Phone: [phone] Fax: [phone]     AM-PAC Score   24   Risk Assessment Score 1     Past Medical History:   Diagnosis Date    Anxiety     Depression        Past Surgical History:   Procedure Laterality Date    HYSTERECTOMY      KNEE SURGERY      SHOULDER SURGERY      TRANSFORAMINAL EPIDURAL INJECTION OF STEROID Bilateral 6/4/2019    Procedure: Injection,steroid,epidural,transforaminal approach L4-5;  Surgeon: Jeyson Kent MD;  Location: [facility] OR;  Service: Pain Management;  Laterality: Bilateral;    TRANSFORAMINAL EPIDURAL INJECTION OF STEROID Bilateral 7/25/2019    Procedure: Injection,steroid,epidural,transforaminal approach;  Surgeon: Jeyson Kent MD;  Location: [facility] OR;  Service: Pain Management;  Laterality: Bilateral;  L4-5    vaginal sling           Home Enviroment     Living Arrangement: Lives with spouse  Home Environment: 1-story house/ trailer, walk-in shower  Home Safety Concerns: Pets in the home: cats (1).    DISCHARGE CAREGIVER/SUPPORT SYSTEM     Identified post-op caregiver: Patient has spouse / significant other.  Patient's caregiver(s) will be able to provide physical assistance. Patient will have someone to assist overnight.      Caregiver present at pre-op interview:  No      PRE-OPERATIVE FUNCTIONAL STATUS      Employment: Employed full time    Pre-op Functional Status: Patient is independent with mobility/ambulation, transfers, ADL's, IADL's.    Use of assistive device for ambulation: none  ADL: self care  ADL Limitations: difficulty with walking  Medical Restrictions: Unstable ambulation and Decreased range of motions in extremities    POTENTIAL BARRIERS TO DISCHARGE/POTENTIAL POST-OP COMPLICATIONS     No major medical hx that would delay discharge. POSSIBLE SAME DAY DISCHARGE.    DISCHARGE PLAN     Expected LOS of 1 days or less for joint replacement discussed with patient. We also discussed a discharge path of HH for approximately two weeks with a transition to outpatient PT on the third week given no post-op complications.      Patient in agreement with discharge plan: Yes    Discharge to: Discharge home with home health (PT/OT) x2 weeks with transition to outpatient PT     HH:  [facility] ([place], [place]). Patient disclosure form completed and sent to case management for upload to the medical record.      OP PT: [facility] ([place], [place])     Home DME: none    Needed DME at D/C: rolling walker and bedside commode     Rx: Per Dr. Whittington at discharge     Meds to Beds: Yes  Patient expected to discharge on Aspirin 81mg by mouth twice daily for DVT prophylaxis.

## 2022-09-07 NOTE — DISCHARGE INSTRUCTIONS
To confirm, Your doctor has instructed you that surgery is scheduled for: 9/19/22 with Dr. Whittington    Please report to [facility], Registration the morning of surgery. You must check-in and receive a wristband before going to your procedure.    Pre-Op will call the afternoon prior to surgery between 1:00 and 6:00 PM with the final arrival time.  Phone number: [phone]    PLEASE NOTE:  The surgery schedule has many variables which may affect the time of your surgery case.  Family members should be available if your surgery time changes.  Plan to be here the day of your procedure between 4-6 hours.    MEDICATIONS:  TAKE ONLY THESE MEDICATIONS WITH A SMALL SIP OF WATER THE MORNING OF YOUR PROCEDURE:  ZYTREC, ESTADIOL      DO NOT TAKE THESE MEDICATIONS 5-7 DAYS PRIOR to your procedure or per your surgeon's request:   ASPIRIN, ALEVE, ADVIL, IBUPROFEN, FISH OIL VITAMIN E, HERBALS  (May take Tylenol)    ONLY if you are prescribed any types of blood thinners such as:  Aspirin, Coumadin, Plavix, Pradaxa, Xarelto, Aggrenox, Effient, Eliquis, Savasya, Brilinta, or any other, ask your surgeon whether you should stop taking them and how long before surgery you should stop.  You may also need to verify with the prescribing physician if it is ok to stop your medication.      INSTRUCTIONS IMPORTANT!!  Do not eat or drink anything between midnight and the time of your procedure- this includes gum, mints, and candy.  Do not smoke or drink alcoholic beverages 24 hours prior to your procedure.  Shower the night before AND the morning of your procedure with a Chlorhexidine wash such as Hibiclens or Dial antibacterial soap from the neck down.  Do not get it on your face or in your eyes.  You may use your own shampoo and face wash. This helps your skin to be as bacteria free as possible.    If you wear contact lenses, dentures, hearing aids or glasses, bring a container to put them in during surgery and give to a  family member for safe keeping.  Please leave all jewelry, piercing's and valuables at home.   DO NOT remove hair from the surgery site.  Do not shave the incision site unless you are given specific instructions to do so.    ONLY if you have been diagnosed with sleep apnea please bring your C-PAP machine.  ONLY if you wear home oxygen please bring your portable oxygen tank the day of your procedure.  ONLY if you have a history of OPEN HEART SURGERY you will need a clearance from your Cardiologist per Anesthesia.      ONLY for patients requiring bowel prep, written instructions will be given by your doctor's office.  ONLY if you have a neuro stimulator, please bring the controller with you the morning of surgery  ONLY if a type and screen test is needed before surgery, please return:  If your doctor has scheduled you for an overnight stay, bring a small overnight bag with any personal items you need.  Make arrangements in advance for transportation home by a responsible adult.  It is not safe to drive a vehicle during the 24 hours after anesthesia.       [facility] Visitor Policy  Effective July 25, 2022    [facility] will make masks available at entrances and will enforce mask wearing in all common and patient  care areas for employees, patients, and visitors. Masks must be worn properly, ensuring that the nose and mouth  are covered. Children under 2 years of age are excluded from this requirement.    [facility] will continue routine visitation for COVID-19 negative patients, including inpatients, outpatients, and  procedural areas. Visitors will be asked to leave if they exhibit symptoms of respiratory infection, have tested  positive for COVID -19 in the last ten days, have a pending COVID-19 test for symptoms, are unable or refuse  to wear a mask OR do not comply with current policy    All [facility] facilities and properties are tobacco free.  Smoking is NOT allowed.   If you have any questions about these  instructions, call Pre-Op Admit  Nursing at [phone] or the Pre-Op Day Surgery Unit at [phone].

## 2022-09-07 NOTE — PRE ADMISSION SCREENING
"               [facility] Risk Assessment Scale    Patient Name: Saima Winkler  YOB: 1970  MRN: [id number]            RIsk Factor Measure Recommendation Patient Data Scale/Score   BMI >40 Reconsider surgery, weight loss   Estimated body mass index is 25.53 kg/m² as calculated from the following:    Height as of this encounter: 5' 7" (1.702 m).    Weight as of this encounter: 73.9 kg (163 lb).   [] 0 = 1 - 24.9  [x] 1 = 25-29.9  [] 2 = 30-34.9  [] 3 = 35-39.9  [] 4 = 40-44.9  [] 5 = 45-99.9   Hemoglobin AIC (if applicable) >9 Delay surgery until DM under control  Refer for:  Nutrition Therapy  Exercise   Medication    No results found for: LABA1C, HGBA1C    Lab Results   Component Value Date     09/07/2022      [] 0 = 4.0-5.6  [] 1 = 5.7-6.4  [] 2 = 6.5-6.9  [] 3 = 7.0-7.9  [] 4 = 8.0-8.9  [] 5 = 9.0-12   Hemoglobin (Anemia) <9 Delay surgery   Correct anemia Lab Results   Component Value Date    HGB 12.2 09/07/2022    [] 20 - <9.0                    Albumin <3 Delay surgery &Workup Lab Results   Component Value Date    ALBUMIN 4.0 09/07/2022    [] 20 - <3.0   Smoking Cessation >4 Weeks Delay Surgery  Refer to OP Cessation Class    Former Smoker  Quit: 2007 [] 20 - current smoker                                _____ PPD                    Hx of MI, PE, Arrhythmia, CVA, DVT <30 Days Delay Surgery    N/A [] 20      Infection Variable Delay surgery and re-evaluate   N/A [] 20 - recent/current infection     Depression (PHQ) >10 out of 27 Delay Surgery and re-evaluate  Medication  Counseling              [x] 0     []1     []2     []3      []4      [] 5                    (1-4)      (5-9)  (10-14)  (15-19)   (20-27)     Memory Impairment & Memory loss (Mini-Cog Screening Tool) Advanced dementia and/or Parkinson's Reconsider surgery     [x] 0     []1     []2     []3     []4     [] 5     Physical Conditioning (Modified AM-PAC Per Physical Therapy at [facility]) Unable to ambulate on day of surgery Delay surgery " and re-evaluate  Pre-Rehabilitation   (PT evaluation)       [x]  0   []4       []8     []12        []16     []20       (<20%)   (<40%)   (<60%)   (<80% )    (>80%)     Home Environment/Caregiver support  (Per /Navigator Interview)    Availability of basic services and/or approprate assistance during post-operative period Delay surgery and re-evaluate  Safe home environment  Health   1 week post-surgery  Transportation  availability  Ability to obtain DME/Medications post-op    [x] 0     []1     []2     []3     []4     [] 5  [x] 0     []1     []2     []3     []4     [] 5  [x] 0     []1     []2     []3     []4     [] 5  [x] 0     []1     []2     []3     []4     [] 5         MD Contact: Dr. Whittington Comments:  Total Score:  1

## 2022-09-08 DIAGNOSIS — M17.11 PRIMARY OSTEOARTHRITIS OF RIGHT KNEE: Primary | ICD-10-CM

## 2022-09-08 PROCEDURE — 87081 CULTURE SCREEN ONLY: CPT | Performed by: ORTHOPAEDIC SURGERY

## 2022-09-10 LAB — MRSA SPEC QL CULT: NORMAL

## 2022-09-12 ENCOUNTER — PATIENT MESSAGE (OUTPATIENT)
Dept: ORTHOPEDICS | Facility: CLINIC | Age: 52
End: 2022-09-12

## 2022-09-12 ENCOUNTER — TELEPHONE (OUTPATIENT)
Dept: ORTHOPEDICS | Facility: CLINIC | Age: 52
End: 2022-09-12

## 2022-09-12 DIAGNOSIS — M17.11 PRIMARY OSTEOARTHRITIS OF RIGHT KNEE: Primary | ICD-10-CM

## 2022-09-12 RX ORDER — HYDROCODONE BITARTRATE AND ACETAMINOPHEN 5; 325 MG/1; MG/1
1 TABLET ORAL
Qty: 7 TABLET | Refills: 0 | Status: SHIPPED | OUTPATIENT
Start: 2022-09-12 | End: 2022-09-15

## 2022-09-12 RX ORDER — HYDROCODONE BITARTRATE AND ACETAMINOPHEN 5; 325 MG/1; MG/1
1 TABLET ORAL EVERY 8 HOURS PRN
Qty: 21 TABLET | Refills: 0 | Status: SHIPPED | OUTPATIENT
Start: 2022-09-12 | End: 2022-09-12

## 2022-09-12 NOTE — TELEPHONE ENCOUNTER
I sent in an electronic prescription for Norco 5/325 with instructions take 1 tab by mouth daily as needed for pain.  I prescribed quantity 7.  Patient is 1 week out from a right total knee arthroplasty.

## 2022-09-13 ENCOUNTER — TELEPHONE (OUTPATIENT)
Dept: ORTHOPEDICS | Facility: CLINIC | Age: 52
End: 2022-09-13

## 2022-09-13 DIAGNOSIS — Z96.651 STATUS POST TOTAL KNEE REPLACEMENT, RIGHT: Primary | ICD-10-CM

## 2022-09-15 RX ORDER — GABAPENTIN 300 MG/1
300 CAPSULE ORAL 2 TIMES DAILY
Qty: 60 CAPSULE | Refills: 0 | Status: SHIPPED | OUTPATIENT
Start: 2022-09-15 | End: 2022-10-07 | Stop reason: SDUPTHER

## 2022-09-15 RX ORDER — DOCUSATE SODIUM 100 MG/1
100 CAPSULE, LIQUID FILLED ORAL 2 TIMES DAILY
Qty: 60 CAPSULE | Refills: 0 | Status: SHIPPED | OUTPATIENT
Start: 2022-09-15 | End: 2022-10-15

## 2022-09-15 RX ORDER — OXYCODONE AND ACETAMINOPHEN 7.5; 325 MG/1; MG/1
1 TABLET ORAL EVERY 6 HOURS PRN
Qty: 28 TABLET | Refills: 0 | Status: SHIPPED | OUTPATIENT
Start: 2022-09-15 | End: 2022-09-25 | Stop reason: SDUPTHER

## 2022-09-15 RX ORDER — ASPIRIN 81 MG/1
81 TABLET ORAL EVERY 12 HOURS
Qty: 60 TABLET | Refills: 0 | Status: SHIPPED | OUTPATIENT
Start: 2022-09-15 | End: 2022-11-23

## 2022-09-15 RX ORDER — CELECOXIB 200 MG/1
200 CAPSULE ORAL 2 TIMES DAILY
Qty: 60 CAPSULE | Refills: 0 | Status: SHIPPED | OUTPATIENT
Start: 2022-09-15 | End: 2022-10-15

## 2022-09-19 ENCOUNTER — ANESTHESIA (OUTPATIENT)
Dept: SURGERY | Facility: HOSPITAL | Age: 52
End: 2022-09-19
Payer: COMMERCIAL

## 2022-09-19 ENCOUNTER — ANESTHESIA EVENT (OUTPATIENT)
Dept: SURGERY | Facility: HOSPITAL | Age: 52
End: 2022-09-19
Payer: COMMERCIAL

## 2022-09-19 ENCOUNTER — HOSPITAL ENCOUNTER (OUTPATIENT)
Facility: HOSPITAL | Age: 52
Discharge: HOME | End: 2022-09-19
Attending: ORTHOPAEDIC SURGERY | Admitting: ORTHOPAEDIC SURGERY
Payer: COMMERCIAL

## 2022-09-19 DIAGNOSIS — M17.11 PRIMARY OSTEOARTHRITIS OF RIGHT KNEE: Primary | ICD-10-CM

## 2022-09-19 PROCEDURE — 71000015 HC POSTOP RECOV 1ST HR: Performed by: ORTHOPAEDIC SURGERY

## 2022-09-19 PROCEDURE — 97161 PT EVAL LOW COMPLEX 20 MIN: CPT

## 2022-09-19 PROCEDURE — 63600175 PHARM REV CODE 636 W HCPCS: Performed by: NURSE ANESTHETIST, CERTIFIED REGISTERED

## 2022-09-19 PROCEDURE — 25000003 PHARM REV CODE 250: Performed by: ORTHOPAEDIC SURGERY

## 2022-09-19 PROCEDURE — C1776 JOINT DEVICE (IMPLANTABLE): HCPCS | Performed by: ORTHOPAEDIC SURGERY

## 2022-09-19 PROCEDURE — 76942 PR U/S GUIDANCE FOR NEEDLE GUIDANCE: ICD-10-PCS | Mod: 26,,, | Performed by: ANESTHESIOLOGY

## 2022-09-19 PROCEDURE — 63600175 PHARM REV CODE 636 W HCPCS: Performed by: ORTHOPAEDIC SURGERY

## 2022-09-19 PROCEDURE — 25000003 PHARM REV CODE 250: Performed by: NURSE ANESTHETIST, CERTIFIED REGISTERED

## 2022-09-19 PROCEDURE — 25000003 PHARM REV CODE 250: Performed by: ANESTHESIOLOGY

## 2022-09-19 PROCEDURE — 99900103 DSU ONLY-NO CHARGE-INITIAL HR (STAT): Performed by: ORTHOPAEDIC SURGERY

## 2022-09-19 PROCEDURE — 64447 PR NERVE BLOCK INJ, ANES/STEROID, FEMORAL, INCL IMAG GUIDANCE: ICD-10-PCS | Mod: 59,RT,, | Performed by: ANESTHESIOLOGY

## 2022-09-19 PROCEDURE — 76942 ECHO GUIDE FOR BIOPSY: CPT | Mod: 59 | Performed by: ANESTHESIOLOGY

## 2022-09-19 PROCEDURE — D9220A PRA ANESTHESIA: Mod: CRNA,,, | Performed by: NURSE ANESTHETIST, CERTIFIED REGISTERED

## 2022-09-19 PROCEDURE — C1713 ANCHOR/SCREW BN/BN,TIS/BN: HCPCS | Performed by: ORTHOPAEDIC SURGERY

## 2022-09-19 PROCEDURE — 63600175 PHARM REV CODE 636 W HCPCS: Performed by: ANESTHESIOLOGY

## 2022-09-19 PROCEDURE — 71000039 HC RECOVERY, EACH ADD'L HOUR: Performed by: ORTHOPAEDIC SURGERY

## 2022-09-19 PROCEDURE — 76942 ECHO GUIDE FOR BIOPSY: CPT | Mod: 26,,, | Performed by: ANESTHESIOLOGY

## 2022-09-19 PROCEDURE — 99900104 DSU ONLY-NO CHARGE-EA ADD'L HR (STAT): Performed by: ORTHOPAEDIC SURGERY

## 2022-09-19 PROCEDURE — 37000009 HC ANESTHESIA EA ADD 15 MINS: Performed by: ORTHOPAEDIC SURGERY

## 2022-09-19 PROCEDURE — 64447 NJX AA&/STRD FEMORAL NRV IMG: CPT | Mod: 59,RT,, | Performed by: ANESTHESIOLOGY

## 2022-09-19 PROCEDURE — D9220A PRA ANESTHESIA: Mod: ANES,,, | Performed by: ANESTHESIOLOGY

## 2022-09-19 PROCEDURE — D9220A PRA ANESTHESIA: ICD-10-PCS | Mod: ANES,,, | Performed by: ANESTHESIOLOGY

## 2022-09-19 PROCEDURE — 36000713 HC OR TIME LEV V EA ADD 15 MIN: Performed by: ORTHOPAEDIC SURGERY

## 2022-09-19 PROCEDURE — 97116 GAIT TRAINING THERAPY: CPT

## 2022-09-19 PROCEDURE — D9220A PRA ANESTHESIA: ICD-10-PCS | Mod: CRNA,,, | Performed by: NURSE ANESTHETIST, CERTIFIED REGISTERED

## 2022-09-19 PROCEDURE — 36000712 HC OR TIME LEV V 1ST 15 MIN: Performed by: ORTHOPAEDIC SURGERY

## 2022-09-19 PROCEDURE — C9290 INJ, BUPIVACAINE LIPOSOME: HCPCS | Performed by: ANESTHESIOLOGY

## 2022-09-19 PROCEDURE — C1889 IMPLANT/INSERT DEVICE, NOC: HCPCS | Performed by: ORTHOPAEDIC SURGERY

## 2022-09-19 PROCEDURE — 27201423 OPTIME MED/SURG SUP & DEVICES STERILE SUPPLY: Performed by: ORTHOPAEDIC SURGERY

## 2022-09-19 PROCEDURE — 71000033 HC RECOVERY, INTIAL HOUR: Performed by: ORTHOPAEDIC SURGERY

## 2022-09-19 PROCEDURE — 63600175 PHARM REV CODE 636 W HCPCS

## 2022-09-19 PROCEDURE — 37000008 HC ANESTHESIA 1ST 15 MINUTES: Performed by: ORTHOPAEDIC SURGERY

## 2022-09-19 DEVICE — PRIMARY TIBIAL BASEPLATE
Type: IMPLANTABLE DEVICE | Site: KNEE | Status: FUNCTIONAL
Brand: TRIATHLON

## 2022-09-19 DEVICE — TIBIAL BEARING INSERT - CS
Type: IMPLANTABLE DEVICE | Site: KNEE | Status: FUNCTIONAL
Brand: TRIATHLON

## 2022-09-19 DEVICE — CRUCIATE RETAINING FEMORAL
Type: IMPLANTABLE DEVICE | Site: KNEE | Status: FUNCTIONAL
Brand: TRIATHLON

## 2022-09-19 DEVICE — CEMENT BONE SIMPLEX HV RADPQ: Type: IMPLANTABLE DEVICE | Site: KNEE | Status: FUNCTIONAL

## 2022-09-19 DEVICE — SYMMETRIC PATELLA
Type: IMPLANTABLE DEVICE | Site: KNEE | Status: FUNCTIONAL
Brand: TRIATHLON

## 2022-09-19 RX ORDER — CELECOXIB 100 MG/1
200 CAPSULE ORAL 2 TIMES DAILY
Status: CANCELLED | OUTPATIENT
Start: 2022-09-19

## 2022-09-19 RX ORDER — MEPERIDINE HYDROCHLORIDE 50 MG/ML
12.5 INJECTION INTRAMUSCULAR; INTRAVENOUS; SUBCUTANEOUS ONCE AS NEEDED
Status: COMPLETED | OUTPATIENT
Start: 2022-09-19 | End: 2022-09-19

## 2022-09-19 RX ORDER — BISACODYL 10 MG
10 SUPPOSITORY, RECTAL RECTAL DAILY
Status: CANCELLED | OUTPATIENT
Start: 2022-09-19

## 2022-09-19 RX ORDER — ONDANSETRON 2 MG/ML
INJECTION INTRAMUSCULAR; INTRAVENOUS
Status: DISCONTINUED | OUTPATIENT
Start: 2022-09-19 | End: 2022-09-19

## 2022-09-19 RX ORDER — CEFAZOLIN SODIUM 2 G/50ML
2 SOLUTION INTRAVENOUS
Status: CANCELLED | OUTPATIENT
Start: 2022-09-19 | End: 2022-09-20

## 2022-09-19 RX ORDER — DIPHENHYDRAMINE HYDROCHLORIDE 50 MG/ML
12.5 INJECTION INTRAMUSCULAR; INTRAVENOUS ONCE AS NEEDED
Status: DISCONTINUED | OUTPATIENT
Start: 2022-09-19 | End: 2022-09-19 | Stop reason: HOSPADM

## 2022-09-19 RX ORDER — PROCHLORPERAZINE EDISYLATE 5 MG/ML
5 INJECTION INTRAMUSCULAR; INTRAVENOUS EVERY 30 MIN PRN
Status: DISCONTINUED | OUTPATIENT
Start: 2022-09-19 | End: 2022-09-19 | Stop reason: HOSPADM

## 2022-09-19 RX ORDER — LIDOCAINE HYDROCHLORIDE 20 MG/ML
INJECTION INTRAVENOUS
Status: DISCONTINUED | OUTPATIENT
Start: 2022-09-19 | End: 2022-09-19

## 2022-09-19 RX ORDER — CELECOXIB 100 MG/1
100 CAPSULE ORAL EVERY 12 HOURS
Status: DISCONTINUED | OUTPATIENT
Start: 2022-09-20 | End: 2022-09-19 | Stop reason: HOSPADM

## 2022-09-19 RX ORDER — SODIUM CHLORIDE 0.9 % (FLUSH) 0.9 %
5 SYRINGE (ML) INJECTION
Status: DISCONTINUED | OUTPATIENT
Start: 2022-09-19 | End: 2022-09-19 | Stop reason: HOSPADM

## 2022-09-19 RX ORDER — CELECOXIB 100 MG/1
200 CAPSULE ORAL ONCE
Status: DISCONTINUED | OUTPATIENT
Start: 2022-09-19 | End: 2022-09-19 | Stop reason: HOSPADM

## 2022-09-19 RX ORDER — OXYCODONE HCL 10 MG/1
10 TABLET, FILM COATED, EXTENDED RELEASE ORAL EVERY 12 HOURS
Status: DISCONTINUED | OUTPATIENT
Start: 2022-09-20 | End: 2022-09-19 | Stop reason: HOSPADM

## 2022-09-19 RX ORDER — HYDROMORPHONE HYDROCHLORIDE 2 MG/ML
0.5 INJECTION, SOLUTION INTRAMUSCULAR; INTRAVENOUS; SUBCUTANEOUS
Status: DISCONTINUED | OUTPATIENT
Start: 2022-09-20 | End: 2022-09-19 | Stop reason: HOSPADM

## 2022-09-19 RX ORDER — OXYCODONE HYDROCHLORIDE 10 MG/1
10 TABLET ORAL EVERY 4 HOURS PRN
Status: CANCELLED | OUTPATIENT
Start: 2022-09-19

## 2022-09-19 RX ORDER — BUPIVACAINE HYDROCHLORIDE 5 MG/ML
INJECTION, SOLUTION EPIDURAL; INTRACAUDAL
Status: COMPLETED | OUTPATIENT
Start: 2022-09-19 | End: 2022-09-19

## 2022-09-19 RX ORDER — FAMOTIDINE 20 MG/1
20 TABLET, FILM COATED ORAL 2 TIMES DAILY
Status: CANCELLED | OUTPATIENT
Start: 2022-09-19

## 2022-09-19 RX ORDER — DEXTROSE MONOHYDRATE AND SODIUM CHLORIDE 5; .45 G/100ML; G/100ML
INJECTION, SOLUTION INTRAVENOUS CONTINUOUS
Status: CANCELLED | OUTPATIENT
Start: 2022-09-19

## 2022-09-19 RX ORDER — OXYCODONE HYDROCHLORIDE 5 MG/1
5 TABLET ORAL ONCE AS NEEDED
Status: COMPLETED | OUTPATIENT
Start: 2022-09-19 | End: 2022-09-19

## 2022-09-19 RX ORDER — ZOLPIDEM TARTRATE 5 MG/1
5 TABLET ORAL NIGHTLY PRN
Status: CANCELLED | OUTPATIENT
Start: 2022-09-19

## 2022-09-19 RX ORDER — DEXAMETHASONE SODIUM PHOSPHATE 4 MG/ML
INJECTION, SOLUTION INTRA-ARTICULAR; INTRALESIONAL; INTRAMUSCULAR; INTRAVENOUS; SOFT TISSUE
Status: DISCONTINUED | OUTPATIENT
Start: 2022-09-19 | End: 2022-09-19

## 2022-09-19 RX ORDER — FENTANYL CITRATE 50 UG/ML
25 INJECTION, SOLUTION INTRAMUSCULAR; INTRAVENOUS EVERY 5 MIN PRN
Status: DISCONTINUED | OUTPATIENT
Start: 2022-09-19 | End: 2022-09-19 | Stop reason: HOSPADM

## 2022-09-19 RX ORDER — PROMETHAZINE HYDROCHLORIDE 25 MG/1
25 TABLET ORAL EVERY 6 HOURS PRN
Status: DISCONTINUED | OUTPATIENT
Start: 2022-09-19 | End: 2022-09-19 | Stop reason: HOSPADM

## 2022-09-19 RX ORDER — ACETAMINOPHEN 500 MG
1000 TABLET ORAL EVERY 6 HOURS SCHEDULED
Status: DISCONTINUED | OUTPATIENT
Start: 2022-09-20 | End: 2022-09-19 | Stop reason: HOSPADM

## 2022-09-19 RX ORDER — ONDANSETRON 2 MG/ML
4 INJECTION INTRAMUSCULAR; INTRAVENOUS ONCE AS NEEDED
Status: DISCONTINUED | OUTPATIENT
Start: 2022-09-19 | End: 2022-09-19 | Stop reason: HOSPADM

## 2022-09-19 RX ORDER — FENTANYL CITRATE 50 UG/ML
INJECTION, SOLUTION INTRAMUSCULAR; INTRAVENOUS
Status: DISCONTINUED | OUTPATIENT
Start: 2022-09-19 | End: 2022-09-19

## 2022-09-19 RX ORDER — PROPOFOL 10 MG/ML
VIAL (ML) INTRAVENOUS CONTINUOUS PRN
Status: DISCONTINUED | OUTPATIENT
Start: 2022-09-19 | End: 2022-09-19

## 2022-09-19 RX ORDER — ONDANSETRON 2 MG/ML
4 INJECTION INTRAMUSCULAR; INTRAVENOUS EVERY 12 HOURS PRN
Status: DISCONTINUED | OUTPATIENT
Start: 2022-09-19 | End: 2022-09-19 | Stop reason: HOSPADM

## 2022-09-19 RX ORDER — MORPHINE SULFATE 2 MG/ML
2 INJECTION, SOLUTION INTRAMUSCULAR; INTRAVENOUS EVERY 4 HOURS PRN
Status: CANCELLED | OUTPATIENT
Start: 2022-09-19

## 2022-09-19 RX ORDER — OXYCODONE HYDROCHLORIDE 10 MG/1
10 TABLET ORAL
Status: DISCONTINUED | OUTPATIENT
Start: 2022-09-20 | End: 2022-09-19 | Stop reason: HOSPADM

## 2022-09-19 RX ORDER — ONDANSETRON 4 MG/1
8 TABLET, ORALLY DISINTEGRATING ORAL EVERY 8 HOURS PRN
Status: CANCELLED | OUTPATIENT
Start: 2022-09-19

## 2022-09-19 RX ORDER — OXYCODONE HYDROCHLORIDE 5 MG/1
5 TABLET ORAL
Status: DISCONTINUED | OUTPATIENT
Start: 2022-09-20 | End: 2022-09-19 | Stop reason: HOSPADM

## 2022-09-19 RX ORDER — MIDAZOLAM HYDROCHLORIDE 1 MG/ML
INJECTION, SOLUTION INTRAMUSCULAR; INTRAVENOUS
Status: DISCONTINUED | OUTPATIENT
Start: 2022-09-19 | End: 2022-09-19

## 2022-09-19 RX ORDER — POLYETHYLENE GLYCOL 3350 17 G/17G
17 POWDER, FOR SOLUTION ORAL DAILY
Status: CANCELLED | OUTPATIENT
Start: 2022-09-19

## 2022-09-19 RX ORDER — ACETAMINOPHEN 10 MG/ML
INJECTION, SOLUTION INTRAVENOUS
Status: DISCONTINUED | OUTPATIENT
Start: 2022-09-19 | End: 2022-09-19

## 2022-09-19 RX ORDER — HYDROMORPHONE HYDROCHLORIDE 2 MG/ML
0.2 INJECTION, SOLUTION INTRAMUSCULAR; INTRAVENOUS; SUBCUTANEOUS EVERY 5 MIN PRN
Status: DISCONTINUED | OUTPATIENT
Start: 2022-09-19 | End: 2022-09-19 | Stop reason: HOSPADM

## 2022-09-19 RX ORDER — NAPROXEN SODIUM 220 MG/1
81 TABLET, FILM COATED ORAL 2 TIMES DAILY
Status: DISCONTINUED | OUTPATIENT
Start: 2022-09-19 | End: 2022-09-19 | Stop reason: HOSPADM

## 2022-09-19 RX ADMIN — SODIUM CHLORIDE, SODIUM GLUCONATE, SODIUM ACETATE, POTASSIUM CHLORIDE, MAGNESIUM CHLORIDE, SODIUM PHOSPHATE, DIBASIC, AND POTASSIUM PHOSPHATE: .53; .5; .37; .037; .03; .012; .00082 INJECTION, SOLUTION INTRAVENOUS at 01:09

## 2022-09-19 RX ADMIN — MEPERIDINE HYDROCHLORIDE 12.5 MG: 50 INJECTION INTRAMUSCULAR; INTRAVENOUS; SUBCUTANEOUS at 02:09

## 2022-09-19 RX ADMIN — CEFAZOLIN 2 G: 1 INJECTION, POWDER, FOR SOLUTION INTRAVENOUS at 12:09

## 2022-09-19 RX ADMIN — OXYCODONE 5 MG: 5 TABLET ORAL at 02:09

## 2022-09-19 RX ADMIN — BUPIVACAINE HYDROCHLORIDE 10 ML: 5 INJECTION, SOLUTION EPIDURAL; INTRACAUDAL; PERINEURAL at 10:09

## 2022-09-19 RX ADMIN — LIDOCAINE HYDROCHLORIDE 75 MG: 20 INJECTION, SOLUTION INTRAVENOUS at 12:09

## 2022-09-19 RX ADMIN — SODIUM CHLORIDE, SODIUM GLUCONATE, SODIUM ACETATE, POTASSIUM CHLORIDE, MAGNESIUM CHLORIDE, SODIUM PHOSPHATE, DIBASIC, AND POTASSIUM PHOSPHATE: .53; .5; .37; .037; .03; .012; .00082 INJECTION, SOLUTION INTRAVENOUS at 10:09

## 2022-09-19 RX ADMIN — TRANEXAMIC ACID 1000 MG: 100 INJECTION, SOLUTION INTRAVENOUS at 12:09

## 2022-09-19 RX ADMIN — MEPIVACAINE HYDROCHLORIDE 3 ML: 15 INJECTION, SOLUTION EPIDURAL; INFILTRATION at 12:09

## 2022-09-19 RX ADMIN — DEXAMETHASONE SODIUM PHOSPHATE 4 MG: 4 INJECTION, SOLUTION INTRA-ARTICULAR; INTRALESIONAL; INTRAMUSCULAR; INTRAVENOUS; SOFT TISSUE at 12:09

## 2022-09-19 RX ADMIN — ONDANSETRON 4 MG: 2 INJECTION INTRAMUSCULAR; INTRAVENOUS at 12:09

## 2022-09-19 RX ADMIN — FENTANYL CITRATE 100 MCG: 50 INJECTION, SOLUTION INTRAMUSCULAR; INTRAVENOUS at 10:09

## 2022-09-19 RX ADMIN — PROCHLORPERAZINE EDISYLATE 5 MG: 5 INJECTION INTRAMUSCULAR; INTRAVENOUS at 02:09

## 2022-09-19 RX ADMIN — BUPIVACAINE 20 ML: 13.3 INJECTION, SUSPENSION, LIPOSOMAL INFILTRATION at 10:09

## 2022-09-19 RX ADMIN — PROPOFOL 70 MCG/KG/MIN: 10 INJECTION, EMULSION INTRAVENOUS at 12:09

## 2022-09-19 RX ADMIN — MIDAZOLAM 2 MG: 1 INJECTION INTRAMUSCULAR; INTRAVENOUS at 12:09

## 2022-09-19 RX ADMIN — ACETAMINOPHEN 1000 MG: 10 INJECTION, SOLUTION INTRAVENOUS at 12:09

## 2022-09-19 RX ADMIN — MIDAZOLAM 2 MG: 1 INJECTION INTRAMUSCULAR; INTRAVENOUS at 10:09

## 2022-09-19 RX ADMIN — ROPIVACAINE HYDROCHLORIDE: 5 INJECTION, SOLUTION EPIDURAL; INFILTRATION; PERINEURAL at 12:09

## 2022-09-19 NOTE — PLAN OF CARE
[facility] received a call from day surgery stating that the pt's  would take the DME to the car while the pt was in surgery.      Per Alysha Allen with [facility] - Ricky to pull RW and BSC. [facility] delivered DME to pt and spouse and returned paperwork to first floor DME closet.

## 2022-09-19 NOTE — PLAN OF CARE
Pt awake, alert, oriented. Vital signs stable. Cleared for discharge by PT. Discharge instructions reviewed with pt and pt's spouse. Verbalized understanding. IV removed, catheter intact. Dressing to R knee clean, dry, and intact. All belongings returned to patient. Pt transferred to car via wheelchair per ATILIO Gonzalez. Safety maintained.

## 2022-09-19 NOTE — ANESTHESIA PROCEDURE NOTES
ADDUCTOR    Patient location during procedure: pre-op   Block not for primary anesthetic.  Reason for block: at surgeon's request and post-op pain management   Post-op Pain Location: DDD RIGHT KNEE, TKA RIGHT KNEE   Start time: 9/19/2022 10:45 AM  Timeout: 9/19/2022 10:45 AM   End time: 9/19/2022 10:55 AM    Staffing  Authorizing Provider: Basil Nunez MD  Performing Provider: Basil Nunez MD    Preanesthetic Checklist  Completed: patient identified, IV checked, site marked, risks and benefits discussed, surgical consent, monitors and equipment checked, pre-op evaluation and timeout performed  Peripheral Block  Patient position: supine  Prep: ChloraPrep  Patient monitoring: cardiac monitor, heart rate, continuous pulse ox, continuous capnometry and frequent blood pressure checks  Block type: adductor canal  Laterality: right  Injection technique: single shot  Needle  Needle type: Stimuplex   Needle gauge: 21 G  Needle localization: ultrasound guidance   -ultrasound image captured on disc.  Assessment  Injection assessment: negative parasthesia, local visualized surrounding nerve and negative aspiration  Paresthesia pain: none  Heart rate change: no  Slow fractionated injection: yes  Pain Tolerance: comfortable throughout block and no complaints  Medications:    Medications: bupivacaine (pf) (MARCAINE) injection 0.5% - Perineural   10 mL - 9/19/2022 10:50:00 AM

## 2022-09-19 NOTE — ANESTHESIA POSTPROCEDURE EVALUATION
Anesthesia Post Evaluation    Patient: Saima Winkler    Procedure(s) Performed: Procedure(s) (LRB):  ROBOTIC ARTHROPLASTY, KNEE, TOTAL (Right)    Final Anesthesia Type: spinal      Patient location during evaluation: PACU  Patient participation: Yes- Able to Participate  Level of consciousness: awake and alert and oriented  Post-procedure vital signs: reviewed and stable  Pain management: adequate  Airway patency: patent    PONV status at discharge: No PONV  Anesthetic complications: no      Cardiovascular status: blood pressure returned to baseline  Respiratory status: unassisted, spontaneous ventilation and room air  Hydration status: euvolemic  Follow-up not needed.          Vitals Value Taken Time   /74 09/19/22 1500   Temp 36.3 °C (97.3 °F) 09/19/22 1455   Pulse 52 09/19/22 1456   Resp 26 09/19/22 1456   SpO2 95 % 09/19/22 1456   Vitals shown include unvalidated device data.      Event Time   Out of Recovery 15:47:00         Pain/Noelle Score: Pain Rating Prior to Med Admin: 0 (9/19/2022  3:30 PM)  Pain Rating Post Med Admin: 0 (9/19/2022  2:20 PM)  Noelle Score: 10 (9/19/2022  3:30 PM)

## 2022-09-19 NOTE — H&P
CC/Indication for Procedure: 52 y.o. female with Primary osteoarthritis of right knee [M17.11].    Patient scheduled for LA TOTAL KNEE ARTHROPLASTY [[id number]] (ROBOTIC ARTHROPLASTY, KNEE, TOTAL).    Past Medical History:   Diagnosis Date    Anxiety     Depression      Past Surgical History:   Procedure Laterality Date    HYSTERECTOMY      KNEE SURGERY      SHOULDER SURGERY      TRANSFORAMINAL EPIDURAL INJECTION OF STEROID Bilateral 6/4/2019    Procedure: Injection,steroid,epidural,transforaminal approach L4-5;  Surgeon: Jeyson Kent MD;  Location: [facility] OR;  Service: Pain Management;  Laterality: Bilateral;    TRANSFORAMINAL EPIDURAL INJECTION OF STEROID Bilateral 7/25/2019    Procedure: Injection,steroid,epidural,transforaminal approach;  Surgeon: Jeyson Kent MD;  Location: [facility] OR;  Service: Pain Management;  Laterality: Bilateral;  L4-5    vaginal sling       Family History   Problem Relation Age of Onset    Asthma Mother     Depression Mother     Hypertension Mother     Heart disease Father     Depression Father     Hypertension Father      Social History     Socioeconomic History    Marital status:     Number of children: 3   Occupational History    Occupation: Registered Nurse      Employer: [employer]     Comment: Cancer Center   Tobacco Use    Smoking status: Former     Types: Cigarettes     Quit date: 1/1/2007     Years since quitting: 15.7    Smokeless tobacco: Never   Substance and Sexual Activity    Alcohol use: Yes    Drug use: No    Sexual activity: Yes     Partners: Male   Social History Narrative    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       Review of patient's allergies indicates:  No Known Allergies      Current Facility-Administered Medications:     [START ON 9/20/2022] acetaminophen tablet 1,000 mg, 1,000 mg, Oral, 4 times per day, Basil Nunez MD    ceFAZolin (ANCEF) 2 g in dextrose 5 % 50 mL IVPB, 2 g, Intravenous, On Call Procedure, Eduar  MD Pk    celecoxib capsule 200 mg, 200 mg, Oral, Once **AND** [START ON 9/20/2022] celecoxib capsule 100 mg, 100 mg, Oral, Q12H, Basil Nunez MD    [START ON 9/20/2022] oxyCODONE immediate release tablet 5 mg, 5 mg, Oral, Q3H PRN **AND** [START ON 9/20/2022] oxyCODONE immediate release tablet Tab 10 mg, 10 mg, Oral, Q3H PRN **AND** [START ON 9/20/2022] HYDROmorphone (PF) injection 0.5 mg, 0.5 mg, Intravenous, Q3H PRN, Basil Nunez MD    ondansetron injection 4 mg, 4 mg, Intravenous, Q12H PRN, Basil Nunez MD    [START ON 9/20/2022] oxyCODONE 12 hr tablet 10 mg, 10 mg, Oral, Q12H, Basil Nunez MD    promethazine tablet 25 mg, 25 mg, Oral, Q6H PRN, Basil Nunez MD    sodium chloride 0.9% 49.3 mL with ROPIvacaine 0.5% (PF) (NAROPIN) 49.3 mL, ketorolac (TORADOL) 30 mg, EPINEPHrine (ADRENALINE) 0.5 mg injection, , Other, Once, Eduar Whittington MD    tranexamic acid (CYKLOKAPRON) 1,000 mg in sodium chloride 0.9% 100 mL, 1,000 mg, Intravenous, On Call Procedure, Eduar Whittington MD    Facility-Administered Medications Ordered in Other Encounters:     BUPivacaine liposome (PF) 1.3 % (13.3 mg/mL) suspension, , Infiltration, PRN, Basil Nunez MD, 20 mL at 09/19/22 1050    fentaNYL 50 mcg/mL injection, , Intravenous, PRN, Yash Obando RN, 100 mcg at 09/19/22 1050    isolyte infusion, , Intravenous, Continuous PRN, Isra Caro CRNA, New Bag at 09/19/22 1034    midazolam injection, , Intravenous, PRN, Yash Obando RN, 2 mg at 09/19/22 1050    ROS:    Denies chest pain or palpitations  Denies shortness of breath  Denies fevers or chills  Denies chest pain  Denies abdominal pain    PE:    General Appearance: Well nourished  Orientation: Oriented to time, place, person  Mental Status: Alert  Heart: RRR  Lungs: CTA  Abdomen: Soft and non-tender    Anesthesia/Surgery risks, benefits and alternative options discussed and understood by patient/family.    This note was  created using Dragon voice recognition software that occasionally misinterpreted phrases or words.

## 2022-09-19 NOTE — PT/OT/SLP EVAL
Physical Therapy Evaluation    Patient Name:  Saima Winkler   MRN:  [id number]    Recommendations:     Discharge Recommendations:  home health PT   Discharge Equipment Recommendations: walker, rolling   Barriers to discharge: None    Assessment:     Saima Winkler is a 52 y.o. female admitted with a medical diagnosis of <principal problem not specified>.  She presents with the following impairments/functional limitations:  weakness, impaired endurance, impaired functional mobility, gait instability, impaired balance, decreased lower extremity function, pain, decreased ROM, orthopedic precautions .    Pt seen at 4th day sx. Pt alert- initially anxious and stated of LE's hypoesthesia. Pt requiring encouragement by spouse and PT- pt completed LE's thera ex with good ability for leg lifting. Pt requiring min assist to sit EOB, pt ambulated with RW 250ft with min assist with 1 seated rest. OOB chair.  Pt to benefit from HHPT.    Rehab Prognosis: Good; patient would benefit from acute skilled PT services to address these deficits and reach maximum level of function.    Recent Surgery: Procedure(s) (LRB):  ROBOTIC ARTHROPLASTY, KNEE, TOTAL (Right) Day of Surgery    Plan:     During this hospitalization, patient to be seen daily to address the identified rehab impairments via gait training, therapeutic activities, therapeutic exercises and progress toward the following goals:    Plan of Care Expires:  09/30/22    Subjective   Spouse at bedside  Pt stated she is employed at [employer]  Chief Complaint: legs and feet are slightly numb  Patient/Family Comments/goals: get well  Pain/Comfort:  Pain Rating 1: 7/10  Location - Side 1: Right  Location 1: knee  Pain Addressed 1: Cessation of Activity, Distraction    Patients cultural, spiritual, Faith conflicts given the current situation:      Living Environment:  Home with spouse with 1 threshold step  Prior to admission, patients level of function was independent.   Equipment used at home: none.  DME owned (not currently used): none.  Upon discharge, patient will have assistance from family.    Objective:     Communicated with nurse Burdick and TROY Jimenez RN prior to session.  Patient found HOB elevated with cryotherapy  upon PT entry to room.    General Precautions: Standard, fall   Orthopedic Precautions:RLE weight bearing as tolerated   Braces: N/A  Respiratory Status: Room air    Exams:  Postural Exam:  Patient presented with the following abnormalities:    -       Rounded shoulders  -       Forward head  -       BMI 25  RLE ROM: Deficits: RK flexion ~80*  RLE Strength: Deficits: 3/5  LLE ROM: WFL  LLE Strength: WFL    Functional Mobility:  Bed Mobility:     Rolling Left:  minimum assistance  Scooting: minimum assistance  Supine to Sit: minimum assistance  Transfers:     Sit to Stand:  minimum assistance with rolling walker  Bed to Chair: minimum assistance with  rolling walker  using  Stand Pivot  Gait: 250ft with RW min assist with 1 seated rest. VC for step and RW sequencing    Therapeutic Activities and Exercises:   Patient was educated on the importance of OOB activity and functional mobility to negate negative effects of prolonged bed rest during hospitalization, safe transfers and ambulation, and D/C planning   Thera ex with AP,QS,GS,SLR x 10-20 reps  OOB chair post PT and reclined    AM-PAC 6 CLICK MOBILITY  Total Score:16     Patient left up in chair with all lines intact, call button in reach, and nurse Burdick and spouse present.    GOALS:   Multidisciplinary Problems       Physical Therapy Goals          Problem: Physical Therapy    Goal Priority Disciplines Outcome Goal Variances Interventions   Physical Therapy Goal     PT, PT/OT Ongoing, Progressing     Description: Goals to be met by: 2022     Patient will increase functional independence with mobility by performin. Supine to sit with Contact Guard Assistance  2. Sit to stand transfer with  54 y/o M w/ PMHx morbid obesity, afib/flutter in the past s/p ablation has not been on blood thinners since ablation p/w SOB for 3 weeks. Patient also states that for the last 3 weeks he has had increased BUCKLEY.    1.	A-Fib/Flutter with RVR  2.	Lt. Atrial appendage thrombus.  3.	Acute HFrEF  4.	CM (EF 25-30%) likely tachycardia induced.   5.	Morbid obesity BMI 82.5  6.	GONZALEZ on CKD-3 - improving         PLAN:    ·	monitor HR with ambulation  ·	continue telemetry  ·	EP recommended to cont Eliquis for six weeks and F/U with them as an out pt to reschedule for CV  ·	Cont Lasix 40 mg po daily and metolazone  ·	Renal function improving. Follow BMP  ·	Check i's and o's and daily wts  ·	Low salt diet and water restriction to 1.5 L/D  ·	No ACE-I or ARB now due to GONZALEZ   ·	Percocet 5/325 PRN for backache.   ·	continue physical therapy   ·	    PROGRESS NOTE HANDOFF    Pending: ambulation with physical therapy and monitor HR    Family discussion: yes    Disposition: home with services (pt refuses STR at SNF) Contact Guard Assistance  3. Bed to chair transfer with Contact Guard Assistance using Rolling Walker  4. Gait  x 250 feet with Contact Guard Assistance using Rolling Walker.   5. Lower extremity exercise program x20 reps                        History:     Past Medical History:   Diagnosis Date    Anxiety     Depression        Past Surgical History:   Procedure Laterality Date    HYSTERECTOMY      KNEE SURGERY      SHOULDER SURGERY      TRANSFORAMINAL EPIDURAL INJECTION OF STEROID Bilateral 6/4/2019    Procedure: Injection,steroid,epidural,transforaminal approach L4-5;  Surgeon: Jeyson Kent MD;  Location: [facility] OR;  Service: Pain Management;  Laterality: Bilateral;    TRANSFORAMINAL EPIDURAL INJECTION OF STEROID Bilateral 7/25/2019    Procedure: Injection,steroid,epidural,transforaminal approach;  Surgeon: Jeyson Kent MD;  Location: [facility] OR;  Service: Pain Management;  Laterality: Bilateral;  L4-5    vaginal sling         Time Tracking:     PT Received On: 09/19/22  PT Start Time: 1604     PT Stop Time: 1626  PT Total Time (min): 22 min     Billable Minutes: Evaluation 10 and Gait Training 12      09/19/2022

## 2022-09-19 NOTE — PLAN OF CARE
Problem: Physical Therapy  Goal: Physical Therapy Goal  Description: Goals to be met by: 2022     Patient will increase functional independence with mobility by performin. Supine to sit with Contact Guard Assistance  2. Sit to stand transfer with Contact Guard Assistance  3. Bed to chair transfer with Contact Guard Assistance using Rolling Walker  4. Gait  x 250 feet with Contact Guard Assistance using Rolling Walker.   5. Lower extremity exercise program x20 reps   Outcome: Ongoing, Progressing   PT eval and treat- thera ex x 10-20 reps. RK flexion ~80*. Gait 250ft with RW min assist. OOB chair

## 2022-09-19 NOTE — TRANSFER OF CARE
"Anesthesia Transfer of Care Note    Patient: Saima Winkler    Procedure(s) Performed: Procedure(s) (LRB):  ROBOTIC ARTHROPLASTY, KNEE, TOTAL (Right)    Patient location: PACU    Anesthesia Type: spinal and MAC    Transport from OR: Transported from OR on 2-3 L/min O2 by NC with adequate spontaneous ventilation    Post pain: adequate analgesia    Post assessment: no apparent anesthetic complications    Post vital signs: stable    Level of consciousness: awake    Nausea/Vomiting: no nausea/vomiting    Complications: none    Transfer of care protocol was followed      Last vitals:   Visit Vitals  /80   Pulse 64   Temp 36.8 °C (98.2 °F) (Skin)   Resp 19   Ht 5' 7" (1.702 m)   Wt 73.9 kg (162 lb 14.7 oz)   SpO2 99%   Breastfeeding No   BMI 25.52 kg/m²     "

## 2022-09-19 NOTE — DISCHARGE INSTRUCTIONS
"Discharge Instructions: After Your Surgery/Procedure  Youve just had surgery. During surgery you were given medicine called anesthesia to keep you relaxed and free of pain. After surgery you may have some pain or nausea. This is common. Here are some tips for feeling better and getting well after surgery.     Stay on schedule with your medication.   Going home  Your doctor or nurse will show you how to take care of yourself when you go home. He or she will also answer your questions. Have an adult family member or friend drive you home.      For your safety we recommend these precaution for the first 24 hours after your procedure:  Do not drive or use heavy equipment.  Do not make important decisions or sign legal papers.  Do not drink alcohol.  Have someone stay with you, if needed. He or she can watch for problems and help keep you safe.  Your concentration, balance, coordination, and judgement may be impaired for many hours after anesthesia.  Use caution when ambulating or standing up.     You may feel weak and "washed out" after anesthesia and surgery.      Subtle residual effects of general anesthesia or sedation with regional / local anesthesia can last more than 24 hours.  Rest for the remainder of the day or longer if your Doctor/Surgeon has advised you to do so.  Although you may feel normal within the first 24 hours, your reflexes and mental ability may be impaired without you realizing it.  You may feel dizzy, lightheaded or sleepy for 24 hours or longer.      Be sure to go to all follow-up visits with your doctor. And rest after your surgery for as long as your doctor tells you to.  Coping with pain  If you have pain after surgery, pain medicine will help you feel better. Take it as told, before pain becomes severe. Also, ask your doctor or pharmacist about other ways to control pain. This might be with heat, ice, or relaxation. And follow any other instructions your surgeon or nurse gives you.  Tips " for taking pain medicine  To get the best relief possible, remember these points:  Pain medicines can upset your stomach. Taking them with a little food may help.  Most pain relievers taken by mouth need at least 20 to 30 minutes to start to work.  Taking medicine on a schedule can help you remember to take it. Try to time your medicine so that you can take it before starting an activity. This might be before you get dressed, go for a walk, or sit down for dinner.  Constipation is a common side effect of pain medicines. Call your doctor before taking any medicines such as laxatives or stool softeners to help ease constipation. Also ask if you should skip any foods. Drinking lots of fluids and eating foods such as fruits and vegetables that are high in fiber can also help. Remember, do not take laxatives unless your surgeon has prescribed them.  Drinking alcohol and taking pain medicine can cause dizziness and slow your breathing. It can even be deadly. Do not drink alcohol while taking pain medicine.  Pain medicine can make you react more slowly to things. Do not drive or run machinery while taking pain medicine.  Your health care provider may tell you to take acetaminophen to help ease your pain. Ask him or her how much you are supposed to take each day. Acetaminophen or other pain relievers may interact with your prescription medicines or other over-the-counter (OTC) drugs. Some prescription medicines have acetaminophen and other ingredients. Using both prescription and OTC acetaminophen for pain can cause you to overdose. Read the labels on your OTC medicines with care. This will help you to clearly know the list of ingredients, how much to take, and any warnings. It may also help you not take too much acetaminophen. If you have questions or do not understand the information, ask your pharmacist or health care provider to explain it to you before you take the OTC medicine.  Managing nausea  Some people have an  upset stomach after surgery. This is often because of anesthesia, pain, or pain medicine, or the stress of surgery. These tips will help you handle nausea and eat healthy foods as you get better. If you were on a special food plan before surgery, ask your doctor if you should follow it while you get better. These tips may help:  Do not push yourself to eat. Your body will tell you when to eat and how much.  Start off with clear liquids and soup. They are easier to digest.  Next try semi-solid foods, such as mashed potatoes, applesauce, and gelatin, as you feel ready.  Slowly move to solid foods. Dont eat fatty, rich, or spicy foods at first.  Do not force yourself to have 3 large meals a day. Instead eat smaller amounts more often.  Take pain medicines with a small amount of solid food, such as crackers or toast, to avoid nausea.     Call your surgeon if  You still have pain an hour after taking medicine. The medicine may not be strong enough.  You feel too sleepy, dizzy, or groggy. The medicine may be too strong.  You have side effects like nausea, vomiting, or skin changes, such as rash, itching, or hives.       If you have obstructive sleep apnea  You were given anesthesia medicine during surgery to keep you comfortable and free of pain. After surgery, you may have more apnea spells because of this medicine and other medicines you were given. The spells may last longer than usual.   At home:  Keep using the continuous positive airway pressure (CPAP) device when you sleep. Unless your health care provider tells you not to, use it when you sleep, day or night. CPAP is a common device used to treat obstructive sleep apnea.  Talk with your provider before taking any pain medicine, muscle relaxants, or sedatives. Your provider will tell you about the possible dangers of taking these medicines.  © 6342-5505 [employer]. [address] [place] [address], [place] [address]. All rights reserved. This information is  not intended as a substitute for professional medical care. Always follow your healthcare professional's instructions.         Using an Incentive Spirometer    An incentive spirometer is a device that helps you do deep breathing exercises. These exercises expand your lungs, aid in circulation, and help prevent pneumonia. Deep breathing exercises also help you breathe better and improve the function of your lungs by:  Keeping your lungs clear  Strengthening your breathing muscles  Helping prevent respiratory complications or problems  The incentive spirometer gives you a way to take an active part in recover. A nurse or therapist will teach you breathing exercises. To do these exercises, you will breathe in through your mouth and not your nose. The incentive spirometer only works correctly if you breathe in through your mouth.  Steps to clear lungs  Step 1. Exhale normally. Then, inhale normally.  Relax and breathe out.  Step 2. Place your lips tightly around the mouthpiece.  Make sure the device is upright and not tilted.  Step 3. Inhale as much air as you can through the mouthpiece (don't breath through your nose).  Inhale slowly and deeply.  Hold your breath long enough to keep the balls or disk raised for at least 3 to 5 seconds, or as instructed by your healthcare provider.  Some spirometers have an indicator to let you know that you are breathing in too fast. If the indicator goes off, breathe in more slowly.  Step 4. Repeat the exercise regularly.  Do this exercise every hour while you're awake, or as instructed by your healthcare provider.  If you were taught deep breathing and coughing exercises, do them regularly as instructed by your healthcare provider.          Post op instructions for prevention of DVT  What is deep vein thrombosis?  Deep vein thrombosis (DVT) is the medical term for blood clots in the deep veins of the leg.  These blood clots can be dangerous.  A DVT can block a blood vessel and keep  blood from getting where it needs to go.  Another problem is that the clot can travel to other parts of the body such as the lungs.  A clot that travels to the lungs is called a pulmonary embolus (PE) and can cause serious problems with breathing which can lead to death.  Am I at risk for DVT/PE?  If you are not very active, you are at risk of DVT.  Anyone confined to bed, sitting for long periods of time, recovering from surgery, etc. increases the risk of DVT.  Other risk factors are cancer diagnosis, certain medications, estrogen replacement in any form,older age, obesity, pregnancy, smoking, history of clotting disorders, and dehydration.  How will I know if I have a DVT?  Swelling in the lower leg  Pain  Warmth, redness, hardness or bulging of the vein  If you have any of these symptoms, call your doctors office right away.  Some people will not have any symptoms until the clot moves to the lungs.  What are the symptoms of a PE?  Panting, shortness of breath, or trouble breathing  Sharp, knife-like chest pain when you breathe  Coughing or coughing up blood  Rapid heartbeat  If you have any of these symptoms or get worse quickly, call 911 for emergency treatment.  How can I prevent a DVT?  Avoid long periods of inactivity and dont cross your legs--get up and walk around every hour or so.  Stay active--walking after surgery is highly encouraged.  This means you should get out of the house and walk in the neighborhood.  Going up and down stairs will not impair healing (unless advised against such activity by your doctor).    Drink plenty of noncaffeinated, nonalcoholic fluids each day to prevent dehydration.  Wear special support stockings, if they have been advised by your doctor.  If you travel, stop at least once an hour and walk around.  Avoid smoking (assistance with stopping is available through your healthcare provider)  Always notify your doctor if you are not able to follow the post operative  instructions that are given to you at the time of discharge.  It may be necessary to prescribe one of the medications available to prevent DVT.         We hope your stay was comfortable as you heal now, mend and rest.    For we have enjoyed taking care of you by giving your our best.    And as you get better, by regaining your health and strength;   We count it as a privilege to have served you and hope your time at [facility] was well spent.            Thank  You!!!

## 2022-09-19 NOTE — ANESTHESIA PREPROCEDURE EVALUATION
09/19/2022  Saima Winkler is a 52 y.o., female.      Pre-op Assessment    I have reviewed the Patient Summary Reports.     I have reviewed the Nursing Notes. I have reviewed the NPO Status.   I have reviewed the Medications.     Review of Systems  Anesthesia Hx:  No problems with previous Anesthesia Denies Hx of Anesthetic complications    Social:  Non-Smoker    Cardiovascular:   Denies Hypertension.  Denies MI.  Denies CAD.    Denies CABG/stent.   Denies Angina.    Pulmonary:   Denies COPD.  Denies Asthma.  Denies Recent URI.    Renal/:   Denies Chronic Renal Disease.     Hepatic/GI:   Denies GERD. Denies Liver Disease.    Neurological:   Denies TIA. Denies CVA. Neuromuscular Disease,  Denies Seizures.    Endocrine:   Denies Diabetes. Denies Hypothyroidism.    Psych:   Psychiatric History          Physical Exam  General: Well nourished, Cooperative, Alert and Oriented    Airway:  Mallampati: II / II  Mouth Opening: Normal  TM Distance: 4 - 6 cm  Tongue: Normal    Dental:  Intact    Chest/Lungs:  Clear to auscultation, Normal Respiratory Rate    Heart:  Rate: Normal  Rhythm: Regular Rhythm  Sounds: Normal        Anesthesia Plan  Type of Anesthesia, risks & benefits discussed:    Anesthesia Type: Gen Natural Airway  Intra-op Monitoring Plan: Standard ASA Monitors  Induction:  IV  Informed Consent: Informed consent signed with the Patient and all parties understand the risks and agree with anesthesia plan.  All questions answered.   ASA Score: 2    Ready For Surgery From Anesthesia Perspective.     .

## 2022-09-19 NOTE — PLAN OF CARE
Report to [facility]. Patient denies pain, no nausea noted, dressing to right knee dry intact no drainage, vs stable, ice to knee, cryo attached but not in use .   in attendance

## 2022-09-19 NOTE — ANESTHESIA PROCEDURE NOTES
Spinal    Diagnosis: DDD RIGHT HIP, YG OF THE RIGHT HIP  Patient location during procedure: OR  Start time: 9/19/2022 12:15 PM  Timeout: 9/19/2022 12:15 PM  End time: 9/19/2022 12:20 PM    Staffing  Authorizing Provider: Basil Nunez MD  Performing Provider: Basil Nunez MD    Preanesthetic Checklist  Completed: patient identified, IV checked, site marked, risks and benefits discussed, surgical consent, monitors and equipment checked, pre-op evaluation and timeout performed  Spinal Block  Patient position: sitting  Prep: ChloraPrep  Patient monitoring: heart rate, cardiac monitor, continuous pulse ox, continuous capnometry and frequent blood pressure checks  Approach: midline  Location: L3-4  Injection technique: single shot  CSF Fluid: clear free-flowing CSF  Needle  Needle type: pencil-tip   Needle gauge: 25 G  Needle length: 3.5 in  Additional Documentation: incremental injection, negative aspiration for heme and no paresthesia on injection  Needle localization: anatomical landmarks  Assessment  Sensory level: T10   Dermatomal levels determined by alcohol wipe  Ease of block: moderate  Patient's tolerance of the procedure: no complaints and comfortable throughout block  Medications:    Medications: mepivacaine (CARBOCAINE) injection 15 mg/mL (1.5%) - Other   3 mL - 9/19/2022 12:15:00 PM

## 2022-09-20 ENCOUNTER — OFFICE VISIT (OUTPATIENT)
Dept: ORTHOPEDICS | Facility: CLINIC | Age: 52
End: 2022-09-20
Payer: COMMERCIAL

## 2022-09-20 ENCOUNTER — PATIENT MESSAGE (OUTPATIENT)
Dept: FAMILY MEDICINE | Facility: CLINIC | Age: 52
End: 2022-09-20

## 2022-09-20 VITALS
SYSTOLIC BLOOD PRESSURE: 130 MMHG | BODY MASS INDEX: 25.43 KG/M2 | WEIGHT: 162 LBS | DIASTOLIC BLOOD PRESSURE: 90 MMHG | HEIGHT: 67 IN

## 2022-09-20 DIAGNOSIS — Z96.651 STATUS POST TOTAL KNEE REPLACEMENT, RIGHT: Primary | ICD-10-CM

## 2022-09-20 PROCEDURE — 1160F RVW MEDS BY RX/DR IN RCRD: CPT | Mod: CPTII,S$GLB,, | Performed by: ORTHOPAEDIC SURGERY

## 2022-09-20 PROCEDURE — 3080F DIAST BP >= 90 MM HG: CPT | Mod: CPTII,S$GLB,, | Performed by: ORTHOPAEDIC SURGERY

## 2022-09-20 PROCEDURE — 1159F MED LIST DOCD IN RCRD: CPT | Mod: CPTII,S$GLB,, | Performed by: ORTHOPAEDIC SURGERY

## 2022-09-20 PROCEDURE — 3008F BODY MASS INDEX DOCD: CPT | Mod: CPTII,S$GLB,, | Performed by: ORTHOPAEDIC SURGERY

## 2022-09-20 PROCEDURE — 1159F PR MEDICATION LIST DOCUMENTED IN MEDICAL RECORD: ICD-10-PCS | Mod: CPTII,S$GLB,, | Performed by: ORTHOPAEDIC SURGERY

## 2022-09-20 PROCEDURE — 3075F SYST BP GE 130 - 139MM HG: CPT | Mod: CPTII,S$GLB,, | Performed by: ORTHOPAEDIC SURGERY

## 2022-09-20 PROCEDURE — 99024 PR POST-OP FOLLOW-UP VISIT: ICD-10-PCS | Mod: S$GLB,,, | Performed by: ORTHOPAEDIC SURGERY

## 2022-09-20 PROCEDURE — 3075F PR MOST RECENT SYSTOLIC BLOOD PRESS GE 130-139MM HG: ICD-10-PCS | Mod: CPTII,S$GLB,, | Performed by: ORTHOPAEDIC SURGERY

## 2022-09-20 PROCEDURE — 3008F PR BODY MASS INDEX (BMI) DOCUMENTED: ICD-10-PCS | Mod: CPTII,S$GLB,, | Performed by: ORTHOPAEDIC SURGERY

## 2022-09-20 PROCEDURE — 1160F PR REVIEW ALL MEDS BY PRESCRIBER/CLIN PHARMACIST DOCUMENTED: ICD-10-PCS | Mod: CPTII,S$GLB,, | Performed by: ORTHOPAEDIC SURGERY

## 2022-09-20 PROCEDURE — 99024 POSTOP FOLLOW-UP VISIT: CPT | Mod: S$GLB,,, | Performed by: ORTHOPAEDIC SURGERY

## 2022-09-20 PROCEDURE — 3080F PR MOST RECENT DIASTOLIC BLOOD PRESSURE >= 90 MM HG: ICD-10-PCS | Mod: CPTII,S$GLB,, | Performed by: ORTHOPAEDIC SURGERY

## 2022-09-20 NOTE — PROGRESS NOTES
[facility] ORTHOPEDICS POST-OP NOTE    Subjective:           Chief Complaint:   Chief Complaint   Patient presents with    Right Knee - Post-op Evaluation     Pt is here for a 1 day PO f/up Right TKA 9/19/22, feels great.        Past Medical History:   Diagnosis Date    Anxiety     Depression        Past Surgical History:   Procedure Laterality Date    HYSTERECTOMY      KNEE SURGERY      SHOULDER SURGERY      TRANSFORAMINAL EPIDURAL INJECTION OF STEROID Bilateral 6/4/2019    Procedure: Injection,steroid,epidural,transforaminal approach L4-5;  Surgeon: Jeyson Kent MD;  Location: [facility] OR;  Service: Pain Management;  Laterality: Bilateral;    TRANSFORAMINAL EPIDURAL INJECTION OF STEROID Bilateral 7/25/2019    Procedure: Injection,steroid,epidural,transforaminal approach;  Surgeon: Jeyson Kent MD;  Location: [facility] OR;  Service: Pain Management;  Laterality: Bilateral;  L4-5    vaginal sling         Current Outpatient Medications   Medication Sig    aspirin (ECOTRIN) 81 MG EC tablet Take 1 tablet (81 mg total) by mouth every 12 (twelve) hours.    celecoxib (CELEBREX) 200 MG capsule Take 1 capsule (200 mg total) by mouth 2 (two) times daily.    cetirizine (ZYRTEC) 10 MG tablet Take 10 mg by mouth once daily.    docusate sodium (COLACE) 100 MG capsule Take 1 capsule (100 mg total) by mouth 2 (two) times daily.    estradioL (ESTRACE) 1 MG tablet Take 1 tablet (1 mg total) by mouth once daily.    gabapentin (NEURONTIN) 300 MG capsule Take 1 capsule (300 mg total) by mouth 2 (two) times daily.    ibuprofen (ADVIL,MOTRIN) 200 MG tablet Take 200 mg by mouth every 6 (six) hours as needed for Pain. 800 mg in am, 200 at noon & 200 at bedtime    oxyCODONE-acetaminophen (ENDOCET) 7.5-325 mg per tablet Take 1 tablet by mouth every 6 (six) hours as needed for Pain.     No current facility-administered medications for this visit.       Review of patient's allergies indicates:  No Known Allergies    Family History   Problem Relation Age  of Onset    Asthma Mother     Depression Mother     Hypertension Mother     Heart disease Father     Depression Father     Hypertension Father        Social History     Socioeconomic History    Marital status:     Number of children: 3   Occupational History    Occupation: Registered Nurse      Employer: [employer]     Comment: Cancer Center   Tobacco Use    Smoking status: Former     Types: Cigarettes     Quit date: 1/1/2007     Years since quitting: 15.7    Smokeless tobacco: Never   Substance and Sexual Activity    Alcohol use: Yes    Drug use: No    Sexual activity: Yes     Partners: Male   Social History Narrative    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       History of present illness:  Saima comes in today postop day 1 status post right total knee arthroplasty.  She is doing quite well.  She reports her pain is well controlled.  She is here today for wound check and dressing change.  She presents to the clinic today weight-bearing as tolerated on her right lower extremity ambulating with a rolling walker.  She is set to begin home health therapy today.    Review of Systems:    Musculoskeletal:  See HPI      Objective:        Physical Examination:    Vital Signs:    Vitals:    09/20/22 0852   BP: (!) 130/90       Body mass index is 25.37 kg/m².    This a well-developed, well nourished patient in no acute distress.  They are alert and oriented and cooperative to examination.        Right knee:  Skin to right knee is clean dry and intact.  Anterior midline incisions are healing well without wound dehiscence or drainage.  There is no erythema or ecchymosis.  There are no signs or symptoms of infection.  Right calf is soft and nontender.  Patient's right knee active range of motion is approximately 0-95 degrees.  She is neurovascularly intact throughout the right lower extremity.  She can weightbear as tolerated right lower extremity.    Pertinent New Results:    XRAY  "Report / Interpretation:   No new radiographs were taken on today's visit.    Assessment/Plan:      1.  Status post right total knee arthroplasty.    Dressing was changed to her right knee today.  Island dressings were placed with soft roll then incorporated her polar Care then covered with 2 six-inch Ace bandages.  She will follow up with us in 2 weeks for wound recheck and suture removal.  She will continue her pain medication as prescribed.  She will begin her home health physical therapy starting today.  At her follow-up visit, plan would be to transition from home health PT to outpatient PT.  She will call for pain medication refills as needed.    Kit An, Physician Assistant, served in the capacity as a "scribe" for this patient encounter.  A "face-to-face" encounter occurred with Dr. Eduar Whittington on this date.  The treatment plan and medical decision-making is outlined above. Patient was seen and examined with a chaperone.     This note was created using Dragon voice recognition software that occasionally misinterpreted phrases or words.          "

## 2022-09-21 VITALS
DIASTOLIC BLOOD PRESSURE: 69 MMHG | TEMPERATURE: 98 F | RESPIRATION RATE: 16 BRPM | WEIGHT: 162.94 LBS | HEART RATE: 60 BPM | SYSTOLIC BLOOD PRESSURE: 105 MMHG | HEIGHT: 67 IN | OXYGEN SATURATION: 98 % | BODY MASS INDEX: 25.57 KG/M2

## 2022-09-22 NOTE — OP NOTE
[facility]  Surgery Department  Operative Note    SUMMARY     Date of Procedure: 9/19/2022     Procedure:  Right robotic total knee arthroplasty    Surgeon(s) and Role:     * Eduar Whittington MD - Primary    Assisting Surgeon:  corby    Pre-Operative Diagnosis: Primary osteoarthritis of right knee [M17.11]    Post-Operative Diagnosis: Post-Op Diagnosis Codes:     * Primary osteoarthritis of right knee [M17.11]    Anesthesia: Spinal    Intraoperative Findings:  Bone on bone arthritis right knee    Description of the Findings of the Procedure:  Patient was placed on the operative table in the supine position.  She was prepped and draped in the usual sterile manner for surgery.  An anterior approach was undertaken to the knee.  It was carried down sharply through the skin.  The medial parapatellar tissues were visualized and divided.  The patella was taken laterally.  A small stab incision was made distally 2 pins were placed into the tibia for the tibial tray.  The tibial array was assembled.  We placed 2 pins in the femur for the femoral arranged.  The femoral array was assembled.  Tibial and femoral check points were inserted.  We now obtained the center of rotation of the hip.  We identified the medial and lateral malleoli.  At this point the knee was flexed to 90 degrees in the femur was verified.  Next the tibia was verified.  We now brought the knee into extension and determine the extension gap.  Similar low when gap was determined.  At this point we established a robotic plan and the plan was accepted.  The robotic cutting arm was brought into position and the cuts were made.  All cuts were made after verifying the bone and the blade.  We now placed a femoral trial and tibial trial.  The construct had full extension, full flexion, and symmetric gaps.  We broached the tibia.  The patella was calibrated and cotton a button was placed.  The construct trialed perfectly.  We paused and irrigated.   We mixed up bone cement and cemented 1st the tibia, next the femur, and finally the patella.  All extra cement was removed at the time of seeing mentation.  We copiously irrigated again.  We closed the extensor mechanism with a combination of 2.  FiberWire and a running Quill stitch.  We irrigated and closed the subcu with 2 0 Vicryl and the skin with 4 0 Monocryl.  Sterile dressings were applied and the patient was noted to be in stable condition pending an x-ray and neurovascular check    Complications: No    Estimated Blood Loss (EBL): 50 mL           Implants:   Implant Name Type Inv. Item Serial No.  Lot No. LRB No. Used Action   CEMENT BONE SIMPLEX HV RADPQ - [id number]  CEMENT BONE SIMPLEX HV RADPQ  [employer].  Right 1 Implanted   CEMENT BONE SIMPLEX HV RADPQ - [id number]  CEMENT BONE SIMPLEX HV RADPQ  [employer].  Right 1 Implanted   PIN BONE [id number] - [id number]  PIN BONE [id number]  [employer].  Right 1 Implanted and Explanted   PIN BONE [id number] - [id number]  PIN BONE [id number]  [employer].  Right 1 Implanted and Explanted   FEMORAL CEMENTED #4 RIGHT - [id number]  FEMORAL CEMENTED #4 RIGHT  [employer]. [id number] Right 1 Implanted   BASEPLATE TIB SHILA PRIM SZ 3 - [id number]  BASEPLATE TIB SHILA PRIM SZ 3  [employer]. [id number] Right 1 Implanted   PATELLA TRI 31X9 X3 POLYETHYLE - [id number]  PATELLA TRI 31X9 X3 POLYETHYLE  [employer]. [id number] Right 1 Implanted   INSERT TRIATHLON X3 SZ3 9MM - [id number]  INSERT TRIATHLON X3 SZ3 9MM  [employer]. [id number] Right 1 Implanted       Specimens:   Specimen (24h ago, onward)      None                    Condition: Good    Disposition: PACU - hemodynamically stable.              Discharge Note    SUMMARY     Admit Date: 9/19/2022    Discharge Date and Time:  09/22/2022 11:21 AM    Hospital Course (synopsis of major diagnoses, care, treatment, and services provided during the course of the hospital stay):  Uneventful      Final Diagnosis: Post-Op Diagnosis Codes:     * Primary osteoarthritis of right knee [M17.11]    Disposition: Home or Self Care    Follow Up/Patient Instructions:     Medications:  Reconciled Home Medications:   Discharge Medication List as of 9/19/2022  2:39 PM        CONTINUE these medications which have NOT CHANGED    Details   aspirin (ECOTRIN) 81 MG EC tablet Take 1 tablet (81 mg total) by mouth every 12 (twelve) hours., Starting Thu 9/15/2022, Until Sat 10/15/2022, Normal      celecoxib (CELEBREX) 200 MG capsule Take 1 capsule (200 mg total) by mouth 2 (two) times daily., Starting Thu 9/15/2022, Until Sat 10/15/2022, Normal      cetirizine (ZYRTEC) 10 MG tablet Take 10 mg by mouth once daily., Historical Med      docusate sodium (COLACE) 100 MG capsule Take 1 capsule (100 mg total) by mouth 2 (two) times daily., Starting u 9/15/2022, Until Sat 10/15/2022, Normal      estradioL (ESTRACE) 1 MG tablet Take 1 tablet (1 mg total) by mouth once daily., Starting u 7/28/2022, Normal      gabapentin (NEURONTIN) 300 MG capsule Take 1 capsule (300 mg total) by mouth 2 (two) times daily., Starting u 9/15/2022, Until Sat 10/15/2022, Normal      ibuprofen (ADVIL,MOTRIN) 200 MG tablet Take 200 mg by mouth every 6 (six) hours as needed for Pain. 800 mg in am, 200 at noon & 200 at bedtime, Historical Med      oxyCODONE-acetaminophen (ENDOCET) 7.5-325 mg per tablet Take 1 tablet by mouth every 6 (six) hours as needed for Pain., Starting u 9/15/2022, Until Thu 9/22/2022 at 2359, Normal           Discharge Procedure Orders   Diet general     Call MD for:  temperature >100.4     Call MD for:  persistent nausea and vomiting     Call MD for:  severe uncontrolled pain     Call MD for:  difficulty breathing, headache or visual disturbances     Call MD for:  redness, tenderness, or signs of infection (pain, swelling, redness, odor or green/yellow discharge around incision site)     Call MD for:  hives     Call  MD for:  persistent dizziness or light-headedness     Call MD for:  extreme fatigue      Follow-up Information       [facility]. Call in 1 day(s).    Specialty: Home Health Services  Contact information:  [address] [address] [address] [phone]

## 2022-09-27 ENCOUNTER — EXTERNAL HOME HEALTH (OUTPATIENT)
Dept: HOME HEALTH SERVICES | Facility: HOSPITAL | Age: 52
End: 2022-09-27

## 2022-09-28 ENCOUNTER — PATIENT MESSAGE (OUTPATIENT)
Dept: ORTHOPEDICS | Facility: CLINIC | Age: 52
End: 2022-09-28

## 2022-09-29 ENCOUNTER — OFFICE VISIT (OUTPATIENT)
Dept: ORTHOPEDICS | Facility: CLINIC | Age: 52
End: 2022-09-29
Payer: COMMERCIAL

## 2022-09-29 VITALS
WEIGHT: 162 LBS | SYSTOLIC BLOOD PRESSURE: 122 MMHG | HEIGHT: 67 IN | DIASTOLIC BLOOD PRESSURE: 78 MMHG | BODY MASS INDEX: 25.43 KG/M2

## 2022-09-29 DIAGNOSIS — Z96.651 STATUS POST TOTAL KNEE REPLACEMENT, RIGHT: Primary | ICD-10-CM

## 2022-09-29 PROCEDURE — 3078F PR MOST RECENT DIASTOLIC BLOOD PRESSURE < 80 MM HG: ICD-10-PCS | Mod: CPTII,S$GLB,, | Performed by: ORTHOPAEDIC SURGERY

## 2022-09-29 PROCEDURE — 1159F PR MEDICATION LIST DOCUMENTED IN MEDICAL RECORD: ICD-10-PCS | Mod: CPTII,S$GLB,, | Performed by: ORTHOPAEDIC SURGERY

## 2022-09-29 PROCEDURE — 3074F SYST BP LT 130 MM HG: CPT | Mod: CPTII,S$GLB,, | Performed by: ORTHOPAEDIC SURGERY

## 2022-09-29 PROCEDURE — 99024 POSTOP FOLLOW-UP VISIT: CPT | Mod: S$GLB,,, | Performed by: ORTHOPAEDIC SURGERY

## 2022-09-29 PROCEDURE — 3008F BODY MASS INDEX DOCD: CPT | Mod: CPTII,S$GLB,, | Performed by: ORTHOPAEDIC SURGERY

## 2022-09-29 PROCEDURE — 99024 PR POST-OP FOLLOW-UP VISIT: ICD-10-PCS | Mod: S$GLB,,, | Performed by: ORTHOPAEDIC SURGERY

## 2022-09-29 PROCEDURE — 1160F RVW MEDS BY RX/DR IN RCRD: CPT | Mod: CPTII,S$GLB,, | Performed by: ORTHOPAEDIC SURGERY

## 2022-09-29 PROCEDURE — 1159F MED LIST DOCD IN RCRD: CPT | Mod: CPTII,S$GLB,, | Performed by: ORTHOPAEDIC SURGERY

## 2022-09-29 PROCEDURE — 3078F DIAST BP <80 MM HG: CPT | Mod: CPTII,S$GLB,, | Performed by: ORTHOPAEDIC SURGERY

## 2022-09-29 PROCEDURE — 3074F PR MOST RECENT SYSTOLIC BLOOD PRESSURE < 130 MM HG: ICD-10-PCS | Mod: CPTII,S$GLB,, | Performed by: ORTHOPAEDIC SURGERY

## 2022-09-29 PROCEDURE — 1160F PR REVIEW ALL MEDS BY PRESCRIBER/CLIN PHARMACIST DOCUMENTED: ICD-10-PCS | Mod: CPTII,S$GLB,, | Performed by: ORTHOPAEDIC SURGERY

## 2022-09-29 PROCEDURE — 3008F PR BODY MASS INDEX (BMI) DOCUMENTED: ICD-10-PCS | Mod: CPTII,S$GLB,, | Performed by: ORTHOPAEDIC SURGERY

## 2022-09-29 NOTE — PROGRESS NOTES
[facility] ORTHOPEDICS    Subjective:     Chief Complaint:   Chief Complaint   Patient presents with    Right Knee - Post-op Evaluation     PO RT TKA 9/26/22, she is having some increased pain. Feels like she might have overdone it. Using walker.        Past Medical History:   Diagnosis Date    Anxiety     Depression        Past Surgical History:   Procedure Laterality Date    HYSTERECTOMY      KNEE SURGERY      ROBOTIC ARTHROPLASTY, KNEE Right 9/19/2022    Procedure: ROBOTIC ARTHROPLASTY, KNEE, TOTAL;  Surgeon: Eduar Whittington MD;  Location: [facility] OR;  Service: Orthopedics;  Laterality: Right;    SHOULDER SURGERY      TRANSFORAMINAL EPIDURAL INJECTION OF STEROID Bilateral 6/4/2019    Procedure: Injection,steroid,epidural,transforaminal approach L4-5;  Surgeon: Jeyson Kent MD;  Location: [facility] OR;  Service: Pain Management;  Laterality: Bilateral;    TRANSFORAMINAL EPIDURAL INJECTION OF STEROID Bilateral 7/25/2019    Procedure: Injection,steroid,epidural,transforaminal approach;  Surgeon: Jeyson Kent MD;  Location: [facility] OR;  Service: Pain Management;  Laterality: Bilateral;  L4-5    vaginal sling         Current Outpatient Medications   Medication Sig    aspirin (ECOTRIN) 81 MG EC tablet Take 1 tablet (81 mg total) by mouth every 12 (twelve) hours.    celecoxib (CELEBREX) 200 MG capsule Take 1 capsule (200 mg total) by mouth 2 (two) times daily.    cetirizine (ZYRTEC) 10 MG tablet Take 10 mg by mouth once daily.    docusate sodium (COLACE) 100 MG capsule Take 1 capsule (100 mg total) by mouth 2 (two) times daily.    estradioL (ESTRACE) 1 MG tablet Take 1 tablet (1 mg total) by mouth once daily.    gabapentin (NEURONTIN) 300 MG capsule Take 1 capsule (300 mg total) by mouth 2 (two) times daily.    ibuprofen (ADVIL,MOTRIN) 200 MG tablet Take 200 mg by mouth every 6 (six) hours as needed for Pain. 800 mg in am, 200 at noon & 200 at bedtime    oxyCODONE-acetaminophen (ENDOCET) 7.5-325 mg per tablet Take 1 tablet by mouth  every 6 (six) hours as needed for Pain.     No current facility-administered medications for this visit.       Review of patient's allergies indicates:  No Known Allergies    Family History   Problem Relation Age of Onset    Asthma Mother     Depression Mother     Hypertension Mother     Heart disease Father     Depression Father     Hypertension Father        Social History     Socioeconomic History    Marital status:     Number of children: 3   Occupational History    Occupation: Registered Nurse      Employer: [employer]     Comment: Cancer Center   Tobacco Use    Smoking status: Former     Types: Cigarettes     Quit date: 1/1/2007     Years since quitting: 15.7    Smokeless tobacco: Never   Substance and Sexual Activity    Alcohol use: Yes    Drug use: No    Sexual activity: Yes     Partners: Male   Social History Narrative    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       History of present illness:  Patient comes in today for the right knee.  She was concerned because she got her wound wet.      Review of Systems:    Constitution: Negative for chills, fever, and sweats.  Negative for unexplained weight loss.    HENT:  Negative for headaches and blurry vision.    Cardiovascular:Negative for chest pain or irregular heart beat. Negative for hypertension.    Respiratory:  Negative for cough and shortness of breath.    Gastrointestinal: Negative for abdominal pain, heartburn, melena, nausea, and vomitting.    Genitourinary:  Negative bladder incontinence and dysuria.    Musculoskeletal:  See HPI for details.     Neurological: Negative for numbness.    Psychiatric/Behavioral: Negative for depression.  The patient is not nervous/anxious.      Endocrine: Negative for polyuria    Hematologic/Lymphatic: Negative for bleeding problem.  Does not bruise/bleed easily.    Skin: Negative for poor would healing and rash    Objective:      Physical Examination:    Vital Signs:     Vitals:    09/29/22 1334   BP: 122/78       Body mass index is 25.37 kg/m².    This a well-developed, well nourished patient in no acute distress.  They are alert and oriented and cooperative to examination.        Wounds clean dry and intact.  Range of motion is 0-95 degrees.  Calf is soft  Pertinent New Results:    XRAY Report / Interpretation:       Assessment/Plan:      Total knee arthroplasty.  Doing very well.  Follow-up in a week for suture removal      This note was created using Dragon voice recognition software that occasionally misinterpreted phrases or words.

## 2022-09-30 ENCOUNTER — PATIENT MESSAGE (OUTPATIENT)
Dept: ORTHOPEDICS | Facility: CLINIC | Age: 52
End: 2022-09-30

## 2022-09-30 DIAGNOSIS — Z96.651 STATUS POST TOTAL KNEE REPLACEMENT, RIGHT: Primary | ICD-10-CM

## 2022-10-03 ENCOUNTER — PATIENT MESSAGE (OUTPATIENT)
Dept: ORTHOPEDICS | Facility: CLINIC | Age: 52
End: 2022-10-03

## 2022-10-03 NOTE — TELEPHONE ENCOUNTER
Patient discharged from home physical therapy, new order has been placed and signed for the patient to begin outpatient physical therapy for range of motion and strengthening.  Status post total knee arthroplasty.

## 2022-10-04 ENCOUNTER — TELEPHONE (OUTPATIENT)
Dept: ORTHOPEDICS | Facility: CLINIC | Age: 52
End: 2022-10-04

## 2022-10-04 ENCOUNTER — OFFICE VISIT (OUTPATIENT)
Dept: ORTHOPEDICS | Facility: CLINIC | Age: 52
End: 2022-10-04
Payer: COMMERCIAL

## 2022-10-04 VITALS — BODY MASS INDEX: 25.43 KG/M2 | HEIGHT: 67 IN | WEIGHT: 162 LBS

## 2022-10-04 DIAGNOSIS — M17.11 PRIMARY OSTEOARTHRITIS OF RIGHT KNEE: ICD-10-CM

## 2022-10-04 DIAGNOSIS — Z96.651 STATUS POST TOTAL KNEE REPLACEMENT, RIGHT: Primary | ICD-10-CM

## 2022-10-04 PROCEDURE — 99024 PR POST-OP FOLLOW-UP VISIT: ICD-10-PCS | Mod: S$GLB,,, | Performed by: ORTHOPAEDIC SURGERY

## 2022-10-04 PROCEDURE — 3008F PR BODY MASS INDEX (BMI) DOCUMENTED: ICD-10-PCS | Mod: CPTII,S$GLB,, | Performed by: ORTHOPAEDIC SURGERY

## 2022-10-04 PROCEDURE — 1160F RVW MEDS BY RX/DR IN RCRD: CPT | Mod: CPTII,S$GLB,, | Performed by: ORTHOPAEDIC SURGERY

## 2022-10-04 PROCEDURE — 99024 POSTOP FOLLOW-UP VISIT: CPT | Mod: S$GLB,,, | Performed by: ORTHOPAEDIC SURGERY

## 2022-10-04 PROCEDURE — 1159F PR MEDICATION LIST DOCUMENTED IN MEDICAL RECORD: ICD-10-PCS | Mod: CPTII,S$GLB,, | Performed by: ORTHOPAEDIC SURGERY

## 2022-10-04 PROCEDURE — 1160F PR REVIEW ALL MEDS BY PRESCRIBER/CLIN PHARMACIST DOCUMENTED: ICD-10-PCS | Mod: CPTII,S$GLB,, | Performed by: ORTHOPAEDIC SURGERY

## 2022-10-04 PROCEDURE — 3008F BODY MASS INDEX DOCD: CPT | Mod: CPTII,S$GLB,, | Performed by: ORTHOPAEDIC SURGERY

## 2022-10-04 PROCEDURE — 1159F MED LIST DOCD IN RCRD: CPT | Mod: CPTII,S$GLB,, | Performed by: ORTHOPAEDIC SURGERY

## 2022-10-04 RX ORDER — OXYCODONE AND ACETAMINOPHEN 7.5; 325 MG/1; MG/1
1 TABLET ORAL EVERY 8 HOURS PRN
Qty: 21 TABLET | Refills: 0 | Status: SHIPPED | OUTPATIENT
Start: 2022-10-04 | End: 2022-10-11 | Stop reason: SDUPTHER

## 2022-10-04 RX ORDER — HYDROCODONE BITARTRATE AND ACETAMINOPHEN 5; 325 MG/1; MG/1
1 TABLET ORAL DAILY PRN
COMMUNITY
Start: 2022-09-17 | End: 2022-10-04

## 2022-10-04 NOTE — PROGRESS NOTES
[facility] ORTHOPEDICS POST-OP NOTE    Subjective:           Chief Complaint:   Chief Complaint   Patient presents with    Right Knee - Post-op Evaluation     Right TKA 09/19/22, patient is in severe pain, she wants to talk about pain medicine.       Past Medical History:   Diagnosis Date    Anxiety     Depression        Past Surgical History:   Procedure Laterality Date    HYSTERECTOMY      KNEE SURGERY      ROBOTIC ARTHROPLASTY, KNEE Right 9/19/2022    Procedure: ROBOTIC ARTHROPLASTY, KNEE, TOTAL;  Surgeon: Eduar Whittington MD;  Location: [facility] OR;  Service: Orthopedics;  Laterality: Right;    SHOULDER SURGERY      TRANSFORAMINAL EPIDURAL INJECTION OF STEROID Bilateral 6/4/2019    Procedure: Injection,steroid,epidural,transforaminal approach L4-5;  Surgeon: Jeyson Kent MD;  Location: [facility] OR;  Service: Pain Management;  Laterality: Bilateral;    TRANSFORAMINAL EPIDURAL INJECTION OF STEROID Bilateral 7/25/2019    Procedure: Injection,steroid,epidural,transforaminal approach;  Surgeon: Jeyson Kent MD;  Location: [facility] OR;  Service: Pain Management;  Laterality: Bilateral;  L4-5    vaginal sling         Current Outpatient Medications   Medication Sig    aspirin (ECOTRIN) 81 MG EC tablet Take 1 tablet (81 mg total) by mouth every 12 (twelve) hours.    celecoxib (CELEBREX) 200 MG capsule Take 1 capsule (200 mg total) by mouth 2 (two) times daily.    cetirizine (ZYRTEC) 10 MG tablet Take 10 mg by mouth once daily.    docusate sodium (COLACE) 100 MG capsule Take 1 capsule (100 mg total) by mouth 2 (two) times daily.    estradioL (ESTRACE) 1 MG tablet Take 1 tablet (1 mg total) by mouth once daily.    gabapentin (NEURONTIN) 300 MG capsule Take 1 capsule (300 mg total) by mouth 2 (two) times daily.    ibuprofen (ADVIL,MOTRIN) 200 MG tablet Take 200 mg by mouth every 6 (six) hours as needed for Pain. 800 mg in am, 200 at noon & 200 at bedtime    HYDROcodone-acetaminophen (NORCO) 5-325 mg per tablet Take 1 tablet by mouth daily  as needed.     No current facility-administered medications for this visit.       Review of patient's allergies indicates:  No Known Allergies    Family History   Problem Relation Age of Onset    Asthma Mother     Depression Mother     Hypertension Mother     Heart disease Father     Depression Father     Hypertension Father        Social History     Socioeconomic History    Marital status:     Number of children: 3   Occupational History    Occupation: Registered Nurse      Employer: [employer]     Comment: Cancer Center   Tobacco Use    Smoking status: Former     Types: Cigarettes     Quit date: 1/1/2007     Years since quitting: 15.7    Smokeless tobacco: Never   Substance and Sexual Activity    Alcohol use: Yes    Drug use: No    Sexual activity: Yes     Partners: Male   Social History Narrative    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       History of present illness:  Saima comes in today 2 weeks status post right total knee arthroplasty.  She is here today for wound check and suture removal.  She is doing quite well postoperatively.  She is ready to transition from home health physical therapy to outpatient PT.  She is also requesting a refill of her pain medication.    Review of Systems:    Musculoskeletal:  See HPI      Objective:        Physical Examination:    Vital Signs:  There were no vitals filed for this visit.    Body mass index is 25.37 kg/m².    This a well-developed, well nourished patient in no acute distress.  They are alert and oriented and cooperative to examination.        Right knee exam:  Skin to her right knee is clean dry and intact.  There is no erythema or ecchymosis.  Right knee anterior midline incision is healing well without wound dehiscence or drainage.  There are no signs or symptoms of infection.  Right calf is soft and nontender.  Right knee range of motion is approximately 0-110°.  Right knee is stable to varus and valgus stresses  "while held in extension.  She can weightbear as tolerated right lower extremity.  She has a negative straight leg raise on the right.    Pertinent New Results:    XRAY Report / Interpretation:   No new radiographs were taken on today's clinic visit.    Assessment/Plan:      1.  Status post right total knee arthroplasty.    Sutures removed from her right knee today along with the Steri-Strips.  She tolerated this well.  She was advised to clean the operative site once a day with warm soapy water and not apply any topical creams ointments.  She is instructed not to submerge operative site underwater in a pool or bathtub type environment for least 1 more week.  She was advised to continue her aspirin 81 mg by mouth twice a day for least 2 more weeks to provide a full 30 days of postoperative DVT prophylaxis coverage.  She was given a prescription to transition from home health PT to outpatient PT to continue with range of motion and strengthening exercises.  I did provide her with a refill of her Percocet 7.5/325 with instructions take 1 tab by mouth every 8 hours as needed for pain.  I prescribed quantity 21.  We will check her back in 1 month to see how she is progressing with her PT and perform radiographs at that visit.    Kit An, Physician Assistant, served in the capacity as a "scribe" for this patient encounter.  A "face-to-face" encounter occurred with Dr. Eduar Whittington on this date.  The treatment plan and medical decision-making is outlined above. Patient was seen and examined with a chaperone.     This note was created using Dragon voice recognition software that occasionally misinterpreted phrases or words.          "

## 2022-10-04 NOTE — TELEPHONE ENCOUNTER
Patient given PT orders in hand, internal facility wasn't going to be able to schedule for 2 weeks, patient wanted order just to be safe.

## 2022-10-11 ENCOUNTER — CLINICAL SUPPORT (OUTPATIENT)
Dept: REHABILITATION | Facility: HOSPITAL | Age: 52
End: 2022-10-11
Payer: COMMERCIAL

## 2022-10-11 DIAGNOSIS — M25.561 ACUTE POSTOPERATIVE PAIN OF RIGHT KNEE: ICD-10-CM

## 2022-10-11 DIAGNOSIS — M25.661 KNEE STIFFNESS, RIGHT: ICD-10-CM

## 2022-10-11 DIAGNOSIS — G89.18 ACUTE POSTOPERATIVE PAIN OF RIGHT KNEE: ICD-10-CM

## 2022-10-11 DIAGNOSIS — Z96.651 STATUS POST TOTAL KNEE REPLACEMENT, RIGHT: ICD-10-CM

## 2022-10-11 PROCEDURE — 97161 PT EVAL LOW COMPLEX 20 MIN: CPT | Mod: PN

## 2022-10-11 PROCEDURE — 97110 THERAPEUTIC EXERCISES: CPT | Mod: PN

## 2022-10-11 NOTE — PLAN OF CARE
PT met face to face with Jonathan Favre, PTA to discuss patient's treatment plan and progress towards established goals.  Treatment will be continued as described in initial report/eval and progress notes.  Patient will be seen by physical therapist every sixth visit and minimally once per month.    Additional information: 3 weeks post  Right TKA; doing very well, good ROM, needs strengthening

## 2022-10-11 NOTE — PLAN OF CARE
"[facility]  Physical Therapy Initial Evaluation    Name: Saima Winkler  Clinic Number: [id number]    Therapy Diagnosis:   Encounter Diagnoses   Name Primary?    Status post total knee replacement, right     Acute postoperative pain of right knee     Knee stiffness, right      Physician: James Urena PA    Physician Orders: PT Eval and Treat   Medical Diagnosis from Referral: Primary OA right knee - Right TKA   Evaluation Date: 10/11/2022  Authorization Period Expiration: 12/31/2022  Plan of Care Expiration: 12/31/2022  Visit # / Visits authorized: 1/ 1  FOTO Visit #:  1/3    Time In: 10:45 am   Time Out: 11:35 am   Total Appointment Time (timed & untimed codes): 50 minutes    Precautions: Standard    Subjective   Date of onset: 9/19/2022   History of current condition - Saima reports:      Medical History:   Past Medical History:   Diagnosis Date    Anxiety     Depression        Surgical History:   Saima Winkler  has a past surgical history that includes Hysterectomy; vaginal sling; Shoulder surgery; Knee surgery; Transforaminal epidural injection of steroid (Bilateral, 6/4/2019); Transforaminal epidural injection of steroid (Bilateral, 7/25/2019); and robotic arthroplasty, knee (Right, 9/19/2022).    Medications:   Saima has a current medication list which includes the following prescription(s): aspirin, celecoxib, cetirizine, docusate sodium, estradiol, gabapentin, ibuprofen, and oxycodone-acetaminophen.    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 Grocery shopping - able to carry " "groceries in without any difficulty; Driving,     Pain:  Current 3/10, worst 9/10, best 2/10   Location: right Knee   Description: achy, tight   Aggravating Factors: morning stiffness, feels better once she is moving;   Easing Factors: pain medication and ice    Pts goals: To return to full function without knee pain     Objective     Observation: Saima ambulated into clinic without use of AD; Slight antalgia with gait - decreased right knee extension on heel strike; good pace, good knee flexion on swing through; Patient is alert/oriented x 4; Pleasant/ cooperative;     Posture:     Edema: minimal edema noted around knee,     Range of Motion:   Knee Left active Left Passive Right Active R passive    0 - 140  0-140  1 - 125 0 - 126        Lower Extremity Strength   Left Right   Knee extension: 5/5 4+/5   Knee flexion: 5/5 4+/5   Hip flexion: 5/5 5/5   Hip extension:  5/5 4+/5   Hip abduction: 5/5 4+/5   Hip adduction: 5/5 4+/5   Hip IR 5/5 4+/5   Hip ER 5/5 5/5   Ankle dorsiflexion: 5/5 5/5   Ankle plantarflexion: 5/5 5/5       Special Tests:    Step down test: valgus collapse from 4" step noted;     Function:    - SLS R: 15 sec   - SLS L: 20   - Squat: able to perform - needed some cueing to increase hip hinging for improved technique    - Sit <--> Stand: able to perform 11 reps in 30 secs. From chair      Joint Mobility: Patellar unrestricted,   Tibiofemoral unrestricted,     Palpation: moderate tenderness to palpation at medial aspect of knee, distal quad at superior part of incision.       Sensation: intact to light touch.     Flexibility:    90/90 SLR = R minimal restriction, L no restriction   Ely's test: R minimal restriction, L no restriction   Keli's test: R no restriction, L no restriction   Charles test: R no restriction, L no restriction    PT Evaluation Completed: Yes  Discussed Plan of Care with patient: Yes       Limitation/Restriction for FOTO Knee  Survey    Therapist reviewed FOTO scores for Saima " CELINE Winkler on 10/11/2022.   FOTO documents entered into [employer] - see Media section.    Limitation Score:   59 %         TREATMENT   Treatment Time In: 11:15   Treatment Time Out: 11:35   Total Treatment time (time-based codes) separate from Evaluation: 20 minutes    Saima received the treatments listed below:    THERAPEUTIC EXERCISES to develop strength and ROM for 20 minutes including    Recumbent Bike x 10 mins level 6  Quad sets  H/S and achilles stretching   Squats - watch form  SLR, S/L hip abduction   Bridges     Home Exercises and Patient Education Provided    Education provided:   - HEP  -soft tissue massage for distal quad/incision     Written Home Exercises Provided: yes.  Exercises were reviewed and Saima was able to demonstrate them prior to the end of the session.  Saima demonstrated good  understanding of the education provided.     See EMR under Media for exercises provided 10/11/2022.    Assessment   Saima is a 52 y.o. female referred to outpatient Physical Therapy with a medical diagnosis of Primary OA of Right Knee - post-op Right TKA . Pt presents with right knee stiffness, right LE strength deficits, decreased functional mobility and endurance;     Pt prognosis is Good.   Pt will benefit from skilled outpatient Physical Therapy to address the deficits stated above and in the chart below, provide pt/family education, and to maximize pt's level of independence.     Plan of care discussed with patient: Yes  Pt's spiritual, cultural and educational needs considered and patient is agreeable to the plan of care and goals as stated below:         Medical Necessity is demonstrated by the following  History  Co-morbidities and personal factors that may impact the plan of care Co-morbidities:   anxiety and depression    Personal Factors:   no deficits     low   Examination  Body Structures and Functions, activity limitations and participation restrictions that may impact the plan of care Body Regions:    lower extremities    Body Systems:    gross symmetry  ROM  strength  gross coordinated movement  balance  gait    Participation Restrictions:   None     Activity limitations:   Learning and applying knowledge  no deficits    General Tasks and Commands  no deficits    Communication  no deficits    Mobility  lifting and carrying objects  walking    Self care  no deficits    Domestic Life  shopping  cooking  doing house work (cleaning house, washing dishes, laundry)    Interactions/Relationships  no deficits    Life Areas  no deficits    Community and Social Life  community life  recreation and leisure         moderate   Clinical Presentation stable and uncomplicated low   Decision Making/ Complexity Score: low     Goals:  Short Term Goals: 3 weeks   Reduction in daily pain to no more than 2/10 with improved ability to perform daily activities.  Able to demonstrate full right knee extension to normalize gait pattern.   Able to walk 1/2 mile without increased knee pain or compensation.   Compliant with HEP     Long Term Goals: 6 weeks   0/10 right knee pain with return to higher level daily activities   Able to walk 1 mile without increased pain/compensation   Able to perform heavy activities at home without increased pain/compensation   FOTO limitation score improved to <=37%   Independent HEP     Plan   Plan of care Certification: 10/11/2022 to 12/31/2022.    Outpatient Physical Therapy 2 -3 times weekly for 6 weeks to include the following interventions: Gait Training, Manual Therapy, Neuromuscular Re-ed, Patient Education, Therapeutic Activities, and Therapeutic Exercise.     Halima Elkins, PT

## 2022-10-13 ENCOUNTER — CLINICAL SUPPORT (OUTPATIENT)
Dept: REHABILITATION | Facility: HOSPITAL | Age: 52
End: 2022-10-13
Payer: COMMERCIAL

## 2022-10-13 DIAGNOSIS — M25.661 KNEE STIFFNESS, RIGHT: Primary | ICD-10-CM

## 2022-10-13 DIAGNOSIS — M25.561 ACUTE POSTOPERATIVE PAIN OF RIGHT KNEE: ICD-10-CM

## 2022-10-13 DIAGNOSIS — G89.18 ACUTE POSTOPERATIVE PAIN OF RIGHT KNEE: ICD-10-CM

## 2022-10-13 PROCEDURE — 97110 THERAPEUTIC EXERCISES: CPT | Mod: PN,CQ

## 2022-10-13 NOTE — PROGRESS NOTES
"[facility]   Physical Therapy Treatment Note     Name: Saima Winkler  Clinic Number: [id number]    Therapy Diagnosis:   Encounter Diagnoses   Name Primary?    Acute postoperative pain of right knee     Knee stiffness, right Yes     Physician: James Urena PA    Visit Date: 10/13/2022    Physician Orders: PT Eval and Treat   Medical Diagnosis from Referral: Primary OA right knee - Right TKA   Evaluation Date: 10/11/2022  Authorization Period Expiration: 12/31/2022  Plan of Care Expiration: 12/31/2022  Visit # / Visits authorized: 2 / 12  FOTO Visit #:  1/3    PTA Visit #: 1/5     Time In: 11:00 AM  Time Out: 11:50 AM  Total Billable Time: 40 minutes    SUBJECTIVE     Pt reports: My knee is not really hurting, it just aches, feels tight.  She was compliant with home exercise program.  Response to previous treatment: N/A  Functional change: N/A    Pain: 4/10  Location: right knee      OBJECTIVE     Objective Measures updated at progress report unless specified.     Treatment     Saiam received the treatments listed below:      therapeutic exercises to develop strength, endurance, ROM, and flexibility for 40 minutes including:  Recumbent Bike level 3 x 15 min  Heel Raises x 15  Toe Taps on 6" step x 2 min  Step-ups on 6" step x 10  Heel Cord stretch on wedge 3 x 30 sec  Standing Hip Flex/Abd/Ext x 15 ea  Squats x 10  Bridges x 10  Ball Squeezes x 2 min  Heel Slides on SB w/strap x 2 min  QS x 2 min  SLR x 10  S/L Hip Abd x 10  SAQ on small gray roll x 2 min    manual therapy techniques:  were applied to the:  for  minutes, including:      neuromuscular re-education activities to improve:  for  minutes. The following activities were included:      therapeutic activities to improve functional performance for   minutes, including:      gait training to improve functional mobility and safety for   minutes, including:      direct contact modalities after being cleared for " contraindications:     supervised modalities after being cleared for contradictions:     hot pack for  minutes to .    cold pack for  minutes to .        Patient Education and Home Exercises     Home Exercises Provided and Patient Education Provided     Education provided:       Written Home Exercises Provided: yes. Exercises were reviewed and Saima was able to demonstrate them prior to the end of the session.  Saima demonstrated good  understanding of the education provided. See EMR under Patient Instructions for exercises provided during therapy sessions    ASSESSMENT     Patient did well with exercises; no increased symptoms noted.     Saima Is progressing well towards her goals.   Pt prognosis is Good.     Pt will continue to benefit from skilled outpatient physical therapy to address the deficits listed in the problem list box on initial evaluation, provide pt/family education and to maximize pt's level of independence in the home and community environment.     Pt's spiritual, cultural and educational needs considered and pt agreeable to plan of care and goals.     Anticipated barriers to physical therapy: None    Goals:   Short Term Goals: 3 weeks   Reduction in daily pain to no more than 2/10 with improved ability to perform daily activities.  Able to demonstrate full right knee extension to normalize gait pattern.   Able to walk 1/2 mile without increased knee pain or compensation.   Compliant with HEP      Long Term Goals: 6 weeks   0/10 right knee pain with return to higher level daily activities   Able to walk 1 mile without increased pain/compensation   Able to perform heavy activities at home without increased pain/compensation   FOTO limitation score improved to <=37%   Independent HEP     PLAN     Continue per POC and progress with strengthening/mobility exercises as patient tolerates.     Jonathan Favre, PTA

## 2022-10-14 ENCOUNTER — CLINICAL SUPPORT (OUTPATIENT)
Dept: REHABILITATION | Facility: HOSPITAL | Age: 52
End: 2022-10-14
Payer: COMMERCIAL

## 2022-10-14 DIAGNOSIS — G89.18 ACUTE POSTOPERATIVE PAIN OF RIGHT KNEE: ICD-10-CM

## 2022-10-14 DIAGNOSIS — M25.561 ACUTE POSTOPERATIVE PAIN OF RIGHT KNEE: ICD-10-CM

## 2022-10-14 DIAGNOSIS — M25.661 KNEE STIFFNESS, RIGHT: Primary | ICD-10-CM

## 2022-10-14 PROCEDURE — 97110 THERAPEUTIC EXERCISES: CPT | Mod: PN,CQ

## 2022-10-14 NOTE — PROGRESS NOTES
"[facility]   Physical Therapy Treatment Note     Name: Saima Winkler  Clinic Number: [id number]    Therapy Diagnosis:   Encounter Diagnoses   Name Primary?    Acute postoperative pain of right knee     Knee stiffness, right Yes     Physician: James Urena PA    Visit Date: 10/14/2022    Physician Orders: PT Eval and Treat   Medical Diagnosis from Referral: Primary OA right knee - Right TKA   Evaluation Date: 10/11/2022  Authorization Period Expiration: 12/31/2022  Plan of Care Expiration: 12/31/2022  Visit # / Visits authorized: 3 / 12  FOTO Visit #:  1/3    PTA Visit #: 2/5     Time In: 10:15 AM  Time Out: 11:05 AM  Total Billable Time: 40 minutes    SUBJECTIVE     Pt reports: I am feeling pretty good today.   She was compliant with home exercise program.  Response to previous treatment: N/A  Functional change: N/A    Pain: 0/10  Location: right knee      OBJECTIVE     Objective Measures updated at progress report unless specified.     Treatment     Saima received the treatments listed below:      therapeutic exercises to develop strength, endurance, ROM, and flexibility for 40 minutes including:  Recumbent Bike level 3 x 15 min  Heel Raises x 15  Toe Taps on 6" step x 2 min  Step-ups on 6" step x 10  Heel Cord stretch on wedge 3 x 30 sec  Standing Hip Flex/Abd/Ext x 15 ea  Squats x 10  Bridges x 10  Ball Squeezes x 2 min  Heel Slides on SB w/strap x 2 min  QS x 2 min  SLR x 10  S/L Hip Abd x 10  SAQ on small gray roll x 2 min    manual therapy techniques:  were applied to the:  for  minutes, including:      neuromuscular re-education activities to improve:  for  minutes. The following activities were included:      therapeutic activities to improve functional performance for   minutes, including:      gait training to improve functional mobility and safety for   minutes, including:      direct contact modalities after being cleared for contraindications:     supervised " modalities after being cleared for contradictions:     hot pack for  minutes to .    cold pack for  minutes to .        Patient Education and Home Exercises     Home Exercises Provided and Patient Education Provided     Education provided:       Written Home Exercises Provided: yes. Exercises were reviewed and Saima was able to demonstrate them prior to the end of the session.  Saima demonstrated good  understanding of the education provided. See EMR under Patient Instructions for exercises provided during therapy sessions    ASSESSMENT     Patient did well with exercises; no increased symptoms noted.     Saima Is progressing well towards her goals.   Pt prognosis is Good.     Pt will continue to benefit from skilled outpatient physical therapy to address the deficits listed in the problem list box on initial evaluation, provide pt/family education and to maximize pt's level of independence in the home and community environment.     Pt's spiritual, cultural and educational needs considered and pt agreeable to plan of care and goals.     Anticipated barriers to physical therapy: None    Goals:   Short Term Goals: 3 weeks   Reduction in daily pain to no more than 2/10 with improved ability to perform daily activities.  Able to demonstrate full right knee extension to normalize gait pattern.   Able to walk 1/2 mile without increased knee pain or compensation.   Compliant with HEP      Long Term Goals: 6 weeks   0/10 right knee pain with return to higher level daily activities   Able to walk 1 mile without increased pain/compensation   Able to perform heavy activities at home without increased pain/compensation   FOTO limitation score improved to <=37%   Independent HEP     PLAN     Continue per POC and progress with strengthening/mobility exercises as patient tolerates.     Jonathan Favre, PTA

## 2022-10-17 ENCOUNTER — CLINICAL SUPPORT (OUTPATIENT)
Dept: REHABILITATION | Facility: HOSPITAL | Age: 52
End: 2022-10-17
Payer: COMMERCIAL

## 2022-10-17 DIAGNOSIS — G89.18 ACUTE POSTOPERATIVE PAIN OF RIGHT KNEE: Primary | ICD-10-CM

## 2022-10-17 DIAGNOSIS — M25.661 KNEE STIFFNESS, RIGHT: ICD-10-CM

## 2022-10-17 DIAGNOSIS — M25.561 ACUTE POSTOPERATIVE PAIN OF RIGHT KNEE: Primary | ICD-10-CM

## 2022-10-17 PROCEDURE — 97110 THERAPEUTIC EXERCISES: CPT | Mod: PN,CQ

## 2022-10-17 NOTE — PROGRESS NOTES
"[facility]   Physical Therapy Treatment Note     Name: Saima Winkler  Clinic Number: [id number]    Therapy Diagnosis:   Encounter Diagnoses   Name Primary?    Acute postoperative pain of right knee Yes    Knee stiffness, right      Physician: James Urena PA    Visit Date: 10/17/2022    Physician Orders: PT Eval and Treat   Medical Diagnosis from Referral: Primary OA right knee - Right TKA   Evaluation Date: 10/11/2022  Authorization Period Expiration: 12/31/2022  Plan of Care Expiration: 12/31/2022  Visit # / Visits authorized: 4/ 12  FOTO Visit #:  1/3    PTA Visit #: 3/5     Time In: 9:00 AM  Time Out: 9:45 AM  Total Billable Time: 40 minutes    SUBJECTIVE     Pt reports: my knee is achy and stiff today.  She was compliant with home exercise program.  Response to previous treatment: N/A  Functional change: N/A    Pain: 3/10  Location: right knee      OBJECTIVE     Objective Measures updated at progress report unless specified.     Treatment     Saima received the treatments listed below:      therapeutic exercises to develop strength, endurance, ROM, and flexibility for 40 minutes including:  Recumbent Bike level 3 x 15 min  Heel Raises x 15  Toe Taps on 6" step x 2 min  Step-ups on 6" step x 10  Heel Cord stretch on wedge 3 x 30 sec  Standing Hip Flex/Abd/Ext x 15 ea  Squats x 10  Bridges x 10  Ball Squeezes x 2 min  Heel Slides on SB w/strap x 2 min  QS x 2 min  SLR x 10  S/L Hip Abd x 10  SAQ on small gray roll x 2 min    manual therapy techniques:  were applied to the:  for  minutes, including:      neuromuscular re-education activities to improve:  for  minutes. The following activities were included:      therapeutic activities to improve functional performance for   minutes, including:      gait training to improve functional mobility and safety for   minutes, including:      direct contact modalities after being cleared for contraindications:     supervised modalities " after being cleared for contradictions:     hot pack for  minutes to .    cold pack for  minutes to .        Patient Education and Home Exercises     Home Exercises Provided and Patient Education Provided     Education provided:       Written Home Exercises Provided: yes. Exercises were reviewed and Saima was able to demonstrate them prior to the end of the session.  Saima demonstrated good  understanding of the education provided. See EMR under Patient Instructions for exercises provided during therapy sessions    ASSESSMENT     Patient did well with exercises and reported less pain after the bike.    Saima Is progressing well towards her goals.   Pt prognosis is Good.     Pt will continue to benefit from skilled outpatient physical therapy to address the deficits listed in the problem list box on initial evaluation, provide pt/family education and to maximize pt's level of independence in the home and community environment.     Pt's spiritual, cultural and educational needs considered and pt agreeable to plan of care and goals.     Anticipated barriers to physical therapy: None    Goals:   Short Term Goals: 3 weeks   Reduction in daily pain to no more than 2/10 with improved ability to perform daily activities.  Able to demonstrate full right knee extension to normalize gait pattern.   Able to walk 1/2 mile without increased knee pain or compensation.   Compliant with HEP      Long Term Goals: 6 weeks   0/10 right knee pain with return to higher level daily activities   Able to walk 1 mile without increased pain/compensation   Able to perform heavy activities at home without increased pain/compensation   FOTO limitation score improved to <=37%   Independent HEP     PLAN     Continue per POC and progress with strengthening/mobility exercises as patient tolerates.     Paul Teague, PTA

## 2022-10-19 ENCOUNTER — CLINICAL SUPPORT (OUTPATIENT)
Dept: REHABILITATION | Facility: HOSPITAL | Age: 52
End: 2022-10-19
Payer: COMMERCIAL

## 2022-10-19 DIAGNOSIS — M25.561 ACUTE POSTOPERATIVE PAIN OF RIGHT KNEE: ICD-10-CM

## 2022-10-19 DIAGNOSIS — G89.18 ACUTE POSTOPERATIVE PAIN OF RIGHT KNEE: ICD-10-CM

## 2022-10-19 DIAGNOSIS — M25.661 KNEE STIFFNESS, RIGHT: Primary | ICD-10-CM

## 2022-10-19 PROCEDURE — 97140 MANUAL THERAPY 1/> REGIONS: CPT | Mod: PN,CQ

## 2022-10-19 PROCEDURE — 97110 THERAPEUTIC EXERCISES: CPT | Mod: PN,CQ

## 2022-10-19 NOTE — PROGRESS NOTES
"[facility]   Physical Therapy Treatment Note     Name: Saima Winkler  Clinic Number: [id number]    Therapy Diagnosis:   Encounter Diagnoses   Name Primary?    Acute postoperative pain of right knee     Knee stiffness, right Yes     Physician: James Urena PA    Visit Date: 10/19/2022    Physician Orders: PT Eval and Treat   Medical Diagnosis from Referral: Primary OA right knee - Right TKA   Evaluation Date: 10/11/2022  Authorization Period Expiration: 12/31/2022  Plan of Care Expiration: 12/31/2022  Visit # / Visits authorized: 5 / 12  FOTO Visit #:  1/3    PTA Visit #: 4/5     Time In: 9:30 AM  Time Out: 10:40 AM  Total Billable Time: 55 minutes    SUBJECTIVE     Pt reports: No new c/o's.   She was compliant with home exercise program.  Response to previous treatment: N/A  Functional change: N/A    Pain: 4 / 10  Location: right knee      OBJECTIVE     Objective Measures updated at progress report unless specified.     Treatment     Saima received the treatments listed below:      therapeutic exercises to develop strength, endurance, ROM, and flexibility for 40 minutes including:  Recumbent Bike level 3 x 15 min  Heel Raises x 20  Toe Taps on 6" step x 2 min  Step-ups on 6" step x 10  Heel Cord stretch on wedge 3 x 30 sec  Standing Hip Flex/Abd/Ext x 15 ea  Squats x 10  Bridges x 10  Ball Squeezes x 2 min  Heel Slides on SB w/strap x 2 min  QS x 2 min  SLR x 10  S/L Hip Abd x 10  SAQ on small gray roll x 2 min    manual therapy techniques:  STM to right quad x 10 minutes      neuromuscular re-education activities to improve:  for  minutes. The following activities were included:      therapeutic activities to improve functional performance for   minutes, including:      gait training to improve functional mobility and safety for   minutes, including:      direct contact modalities after being cleared for contraindications:     supervised modalities after being cleared for " contradictions:     hot pack for  minutes to .    cold pack for  minutes to .        Patient Education and Home Exercises     Home Exercises Provided and Patient Education Provided     Education provided:       Written Home Exercises Provided: yes. Exercises were reviewed and Saima was able to demonstrate them prior to the end of the session.  Saima demonstrated good  understanding of the education provided. See EMR under Patient Instructions for exercises provided during therapy sessions    ASSESSMENT     Patient did well with exercises and reported less pain after STM.     Saima Is progressing well towards her goals.   Pt prognosis is Good.     Pt will continue to benefit from skilled outpatient physical therapy to address the deficits listed in the problem list box on initial evaluation, provide pt/family education and to maximize pt's level of independence in the home and community environment.     Pt's spiritual, cultural and educational needs considered and pt agreeable to plan of care and goals.     Anticipated barriers to physical therapy: None    Goals:   Short Term Goals: 3 weeks   Reduction in daily pain to no more than 2/10 with improved ability to perform daily activities.  Able to demonstrate full right knee extension to normalize gait pattern.   Able to walk 1/2 mile without increased knee pain or compensation.   Compliant with HEP      Long Term Goals: 6 weeks   0/10 right knee pain with return to higher level daily activities   Able to walk 1 mile without increased pain/compensation   Able to perform heavy activities at home without increased pain/compensation   FOTO limitation score improved to <=37%   Independent HEP     PLAN     Continue per POC and progress with strengthening/mobility exercises as patient tolerates.     Jonathan Favre, PTA

## 2022-10-21 ENCOUNTER — CLINICAL SUPPORT (OUTPATIENT)
Dept: REHABILITATION | Facility: HOSPITAL | Age: 52
End: 2022-10-21
Payer: COMMERCIAL

## 2022-10-21 DIAGNOSIS — G89.18 ACUTE POSTOPERATIVE PAIN OF RIGHT KNEE: ICD-10-CM

## 2022-10-21 DIAGNOSIS — M25.561 ACUTE POSTOPERATIVE PAIN OF RIGHT KNEE: ICD-10-CM

## 2022-10-21 DIAGNOSIS — M25.661 KNEE STIFFNESS, RIGHT: Primary | ICD-10-CM

## 2022-10-21 PROCEDURE — 97110 THERAPEUTIC EXERCISES: CPT | Mod: PN,CQ

## 2022-10-21 NOTE — PROGRESS NOTES
"[facility]   Physical Therapy Treatment Note     Name: Saima Winkler  Clinic Number: [id number]    Therapy Diagnosis:   Encounter Diagnoses   Name Primary?    Acute postoperative pain of right knee     Knee stiffness, right Yes     Physician: James Urena PA    Visit Date: 10/21/2022    Physician Orders: PT Eval and Treat   Medical Diagnosis from Referral: Primary OA right knee - Right TKA   Evaluation Date: 10/11/2022  Authorization Period Expiration: 12/31/2022  Plan of Care Expiration: 12/31/2022  Visit # / Visits authorized: 6 / 12  FOTO Visit #:  1/3    PTA Visit #: 5/5     Time In: 9:35 AM  Time Out: 10:20 AM  Total Billable Time: 40 minutes    SUBJECTIVE     Pt reports: No new c/o's.   She was compliant with home exercise program.  Response to previous treatment: N/A  Functional change: N/A    Pain: 2 / 10  Location: right knee      OBJECTIVE     Objective Measures updated at progress report unless specified.     Treatment     Saima received the treatments listed below:      therapeutic exercises to develop strength, endurance, ROM, and flexibility for 40 minutes including:  Recumbent Bike level 3 x 15 min  Heel Raises x 20  Toe Taps on 6" step x 2 min  Step-ups on 6" step x 10  Heel Cord stretch on wedge 3 x 30 sec  Standing Hip Flex/Abd/Ext x 15 ea  Squats x 10  Bridges x 10  Ball Squeezes x 2 min  Heel Slides on SB w/strap x 2 min  QS x 2 min  SLR x 10  S/L Hip Abd x 10  SAQ on small gray roll x 2 min    DNP  manual therapy techniques:  STM to right quad x 10 minutes      neuromuscular re-education activities to improve:  for  minutes. The following activities were included:      therapeutic activities to improve functional performance for   minutes, including:      gait training to improve functional mobility and safety for   minutes, including:      direct contact modalities after being cleared for contraindications:     supervised modalities after being cleared for " contradictions:     hot pack for  minutes to .    cold pack for  minutes to .        Patient Education and Home Exercises     Home Exercises Provided and Patient Education Provided     Education provided:       Written Home Exercises Provided: yes. Exercises were reviewed and Saima was able to demonstrate them prior to the end of the session.  Saima demonstrated good  understanding of the education provided. See EMR under Patient Instructions for exercises provided during therapy sessions    ASSESSMENT     Patient did well with exercises; continues to have tightness in right knee    Saima Is progressing well towards her goals.   Pt prognosis is Good.     Pt will continue to benefit from skilled outpatient physical therapy to address the deficits listed in the problem list box on initial evaluation, provide pt/family education and to maximize pt's level of independence in the home and community environment.     Pt's spiritual, cultural and educational needs considered and pt agreeable to plan of care and goals.     Anticipated barriers to physical therapy: None    Goals:   Short Term Goals: 3 weeks   Reduction in daily pain to no more than 2/10 with improved ability to perform daily activities.  Able to demonstrate full right knee extension to normalize gait pattern.   Able to walk 1/2 mile without increased knee pain or compensation.   Compliant with HEP      Long Term Goals: 6 weeks   0/10 right knee pain with return to higher level daily activities   Able to walk 1 mile without increased pain/compensation   Able to perform heavy activities at home without increased pain/compensation   FOTO limitation score improved to <=37%   Independent HEP     PLAN     Continue per POC and progress with strengthening/mobility exercises as patient tolerates.     Jonathan Favre, PTA

## 2022-10-24 ENCOUNTER — CLINICAL SUPPORT (OUTPATIENT)
Dept: REHABILITATION | Facility: HOSPITAL | Age: 52
End: 2022-10-24
Payer: COMMERCIAL

## 2022-10-24 DIAGNOSIS — M25.661 KNEE STIFFNESS, RIGHT: ICD-10-CM

## 2022-10-24 DIAGNOSIS — G89.18 ACUTE POSTOPERATIVE PAIN OF RIGHT KNEE: Primary | ICD-10-CM

## 2022-10-24 DIAGNOSIS — M25.561 ACUTE POSTOPERATIVE PAIN OF RIGHT KNEE: Primary | ICD-10-CM

## 2022-10-24 PROCEDURE — 97110 THERAPEUTIC EXERCISES: CPT | Mod: PN

## 2022-10-24 PROCEDURE — 97140 MANUAL THERAPY 1/> REGIONS: CPT | Mod: PN

## 2022-10-24 NOTE — PROGRESS NOTES
"[facility]   Physical Therapy Treatment Note     Name: Saima Winkler  Clinic Number: [id number]    Therapy Diagnosis:   Encounter Diagnoses   Name Primary?    Acute postoperative pain of right knee Yes    Knee stiffness, right      Physician: James Urena PA    Visit Date: 10/24/2022    Physician Orders: PT Eval and Treat   Medical Diagnosis from Referral: Primary OA right knee - Right TKA   Evaluation Date: 10/11/2022  Authorization Period Expiration: 12/31/2022  Plan of Care Expiration: 12/31/2022  Visit # / Visits authorized: 6 / 12  FOTO Visit #:  1/3    PTA Visit #: 0/5     Time In: 9:15 AM  Time Out:  10:15   AM  Total Billable Time: 50 minutes    SUBJECTIVE     Pt reports: No new c/o's.  Worried about appearance of her scar.  Saima stated that she bought some of the silicone strips to use on the scar. Reviewed with her how to apply them.   She was compliant with home exercise program.  Response to previous treatment: N/A  Functional change: N/A    Pain: 2 / 10  Location: right knee      OBJECTIVE     Objective Measures updated at progress report unless specified.     Treatment     Saima received the treatments listed below:      therapeutic exercises to develop strength, endurance, ROM, and flexibility for 40 minutes including:  Recumbent Bike level 5 x 15 min  Heel Raises x 20  Toe Taps on 6" step x 2 min  Step-ups on 6" step x 10  Heel Cord stretch on wedge 3 x 30 sec  Standing Hip Flex/Abd/Ext x 15 ea = twisted green band   Squats x 10 - needed cueing for these - moved her to using SB on wall for better technique.   Bridges x 10  Ball Squeezes x 2 min  Heel Slides on SB w/strap x 2 min  QS x 2 min  SLR x 10  S/L Hip Abd x 10  SAQ on 1/2 roll x 2 min - 5#       manual therapy techniques:  x 10 mins IASTM to right quad, chicho-incision tissue, infrapatellar; scar mobilization; PROM through full ROM     Reviewed gait - emphasized need to work on heel to toe pattern with " push-off - activation of gluteals on heel strike > push-off. Able to demonstrate improved pattern with cueing.       neuromuscular re-education activities to improve:  for  minutes. The following activities were included:      therapeutic activities to improve functional performance for   minutes, including:      gait training to improve functional mobility and safety for   minutes, including:      direct contact modalities after being cleared for contraindications:     supervised modalities after being cleared for contradictions:     hot pack for  minutes to .    cold pack for  minutes to .        Patient Education and Home Exercises     Home Exercises Provided and Patient Education Provided     Education provided:       Written Home Exercises Provided: yes. Exercises were reviewed and Saima was able to demonstrate them prior to the end of the session.  Saima demonstrated good  understanding of the education provided. See EMR under Patient Instructions for exercises provided during therapy sessions    ASSESSMENT     Patient did well with exercises; improved ROM, decreased pain following manual treatment; patient demonstrates core mm weakness with standing activities, general LE strength deficits; Need to add some general core exercises to therapy list.     Saima Is progressing well towards her goals.   Pt prognosis is Good.     Pt will continue to benefit from skilled outpatient physical therapy to address the deficits listed in the problem list box on initial evaluation, provide pt/family education and to maximize pt's level of independence in the home and community environment.     Pt's spiritual, cultural and educational needs considered and pt agreeable to plan of care and goals.     Anticipated barriers to physical therapy: None    Goals:   Short Term Goals: 3 weeks   Reduction in daily pain to no more than 2/10 with improved ability to perform daily activities.  Able to demonstrate full right knee  extension to normalize gait pattern.   Able to walk 1/2 mile without increased knee pain or compensation.   Compliant with HEP      Long Term Goals: 6 weeks   0/10 right knee pain with return to higher level daily activities   Able to walk 1 mile without increased pain/compensation   Able to perform heavy activities at home without increased pain/compensation   FOTO limitation score improved to <=37%   Independent HEP     PLAN     Continue per POC and progress with strengthening/mobility exercises as patient tolerates.     Halima Elkins, PT

## 2022-10-25 DIAGNOSIS — Z96.651 STATUS POST TOTAL KNEE REPLACEMENT, RIGHT: Primary | ICD-10-CM

## 2022-10-25 DIAGNOSIS — M17.11 PRIMARY OSTEOARTHRITIS OF RIGHT KNEE: ICD-10-CM

## 2022-10-26 ENCOUNTER — CLINICAL SUPPORT (OUTPATIENT)
Dept: REHABILITATION | Facility: HOSPITAL | Age: 52
End: 2022-10-26
Payer: COMMERCIAL

## 2022-10-26 DIAGNOSIS — M25.561 ACUTE POSTOPERATIVE PAIN OF RIGHT KNEE: Primary | ICD-10-CM

## 2022-10-26 DIAGNOSIS — G89.18 ACUTE POSTOPERATIVE PAIN OF RIGHT KNEE: Primary | ICD-10-CM

## 2022-10-26 DIAGNOSIS — M25.661 KNEE STIFFNESS, RIGHT: ICD-10-CM

## 2022-10-26 PROCEDURE — 97110 THERAPEUTIC EXERCISES: CPT | Mod: PN,CQ

## 2022-10-26 RX ORDER — OXYCODONE AND ACETAMINOPHEN 5; 325 MG/1; MG/1
1 TABLET ORAL EVERY 8 HOURS PRN
Qty: 21 TABLET | Refills: 0 | Status: SHIPPED | OUTPATIENT
Start: 2022-10-26 | End: 2022-11-23

## 2022-10-26 RX ORDER — OXYCODONE AND ACETAMINOPHEN 7.5; 325 MG/1; MG/1
1 TABLET ORAL EVERY 8 HOURS PRN
Qty: 21 TABLET | Refills: 0 | OUTPATIENT
Start: 2022-10-26 | End: 2022-11-02

## 2022-10-26 NOTE — TELEPHONE ENCOUNTER
I sent in an electronic prescription for Percocet 5/325 with instructions to take 1 tab by mouth every 8 hours as needed for pain.  I prescribed quantity 21.  Patient is approximately 5 weeks status post right total knee arthroplasty.  In keeping with clinic policy, I am tapering her dosage from 7.5 mg to 5 mg.      Please see the attached refill request.

## 2022-10-26 NOTE — PROGRESS NOTES
"[facility]   Physical Therapy Treatment Note     Name: Saima Winkler  Clinic Number: [id number]    Therapy Diagnosis:   Encounter Diagnoses   Name Primary?    Acute postoperative pain of right knee Yes    Knee stiffness, right      Physician: James Urena PA    Visit Date: 10/26/2022    Physician Orders: PT Eval and Treat   Medical Diagnosis from Referral: Primary OA right knee - Right TKA   Evaluation Date: 10/11/2022  Authorization Period Expiration: 12/31/2022  Plan of Care Expiration: 12/31/2022  Visit # / Visits authorized: 7 / 12 (8 total)  FOTO Visit #:  1/3    PTA Visit #: 1/5     Time In: 9:00 AM  Time Out:  9:45   AM  Total Billable Time: 45 minutes    SUBJECTIVE     Pt reports: feeling stiff.  She was compliant with home exercise program.  Response to previous treatment: good  Functional change: walking more    Pain: 2 / 10  Location: right knee      OBJECTIVE     Objective Measures updated at progress report unless specified.     Treatment     Saima received the treatments listed below:      therapeutic exercises to develop strength, endurance, ROM, and flexibility for 40 minutes including:  Recumbent Bike level 5 x 15 min  Heel Raises x 20  Toe Taps on 6" step x 2 min  Step-ups on 6" step x 10  Heel Cord stretch on wedge 3 x 30 sec  Standing Hip Flex/Abd/Ext x 15 ea = twisted green band   Squats x 10 - needed cueing for these - moved her to using SB on wall for better technique.   Bridges x 10  Ball Squeezes x 2 min  Heel Slides on SB w/strap x 2 min  QS x 2 min  SLR x 10  S/L Hip Abd x 10  SAQ on 1/2 roll x 2 min - 2#     DNP  manual therapy techniques:  x 0 mins IASTM to right quad, chicho-incision tissue, infrapatellar; scar mobilization; PROM through full ROM     Reviewed gait - emphasized need to work on heel to toe pattern with push-off - activation of gluteals on heel strike > push-off. Able to demonstrate improved pattern with cueing.       neuromuscular " re-education activities to improve:  for  minutes. The following activities were included:      therapeutic activities to improve functional performance for   minutes, including:      gait training to improve functional mobility and safety for   minutes, including:      direct contact modalities after being cleared for contraindications:     supervised modalities after being cleared for contradictions:     hot pack for  minutes to .    cold pack for  minutes to .        Patient Education and Home Exercises     Home Exercises Provided and Patient Education Provided     Education provided:       Written Home Exercises Provided: yes. Exercises were reviewed and Saima was able to demonstrate them prior to the end of the session.  Saima demonstrated good  understanding of the education provided. See EMR under Patient Instructions for exercises provided during therapy sessions    ASSESSMENT     Patient did well with exercises and is motivated to get well soon.    Saima Is progressing well towards her goals.   Pt prognosis is Good.     Pt will continue to benefit from skilled outpatient physical therapy to address the deficits listed in the problem list box on initial evaluation, provide pt/family education and to maximize pt's level of independence in the home and community environment.     Pt's spiritual, cultural and educational needs considered and pt agreeable to plan of care and goals.     Anticipated barriers to physical therapy: None    Goals:   Short Term Goals: 3 weeks   Reduction in daily pain to no more than 2/10 with improved ability to perform daily activities.  Able to demonstrate full right knee extension to normalize gait pattern.   Able to walk 1/2 mile without increased knee pain or compensation.   Compliant with HEP      Long Term Goals: 6 weeks   0/10 right knee pain with return to higher level daily activities   Able to walk 1 mile without increased pain/compensation   Able to perform heavy  activities at home without increased pain/compensation   FOTO limitation score improved to <=37%   Independent HEP     PLAN     Continue per POC and progress with strengthening/mobility exercises as patient tolerates.     Paul Teague, PTA

## 2022-10-27 ENCOUNTER — PATIENT MESSAGE (OUTPATIENT)
Dept: FAMILY MEDICINE | Facility: CLINIC | Age: 52
End: 2022-10-27

## 2022-10-27 DIAGNOSIS — F32.9 REACTIVE DEPRESSION: Primary | ICD-10-CM

## 2022-10-27 DIAGNOSIS — M25.569 CHRONIC KNEE PAIN, UNSPECIFIED LATERALITY: ICD-10-CM

## 2022-10-27 DIAGNOSIS — G89.29 CHRONIC KNEE PAIN, UNSPECIFIED LATERALITY: ICD-10-CM

## 2022-10-27 NOTE — TELEPHONE ENCOUNTER
Reactive depression  -     Ambulatory referral/consult to Psychiatry; Future; Expected date: 11/03/2022

## 2022-10-28 ENCOUNTER — CLINICAL SUPPORT (OUTPATIENT)
Dept: REHABILITATION | Facility: HOSPITAL | Age: 52
End: 2022-10-28
Payer: COMMERCIAL

## 2022-10-28 DIAGNOSIS — G89.18 ACUTE POSTOPERATIVE PAIN OF RIGHT KNEE: Primary | ICD-10-CM

## 2022-10-28 DIAGNOSIS — M25.561 ACUTE POSTOPERATIVE PAIN OF RIGHT KNEE: Primary | ICD-10-CM

## 2022-10-28 DIAGNOSIS — M25.661 KNEE STIFFNESS, RIGHT: ICD-10-CM

## 2022-10-28 PROCEDURE — 97110 THERAPEUTIC EXERCISES: CPT | Mod: PN,CQ

## 2022-10-28 RX ORDER — DULOXETIN HYDROCHLORIDE 30 MG/1
30 CAPSULE, DELAYED RELEASE ORAL DAILY
Qty: 30 CAPSULE | Refills: 2 | Status: SHIPPED | OUTPATIENT
Start: 2022-10-28 | End: 2022-11-23

## 2022-10-28 NOTE — PROGRESS NOTES
"[facility]   Physical Therapy Treatment Note     Name: Saima Winkler  Clinic Number: [id number]    Therapy Diagnosis:   Encounter Diagnoses   Name Primary?    Acute postoperative pain of right knee Yes    Knee stiffness, right      Physician: James Urena PA    Visit Date: 10/28/2022    Physician Orders: PT Eval and Treat   Medical Diagnosis from Referral: Primary OA right knee - Right TKA   Evaluation Date: 10/11/2022  Authorization Period Expiration: 12/31/2022  Plan of Care Expiration: 12/31/2022  Visit # / Visits authorized: 8 / 12 (9 total)  FOTO Visit #:  1/3    PTA Visit #: 2/5     Time In: 9:00 AM  Time Out:  9:45 AM  Total Billable Time: 45 minutes    SUBJECTIVE     Pt reports: feeling good today with the exception of right groin pain.  She was compliant with home exercise program.  Response to previous treatment: good  Functional change: walking more    Pain: 2 / 10  Location: right knee      OBJECTIVE     Objective Measures updated at progress report unless specified.     Treatment     Saima received the treatments listed below:      therapeutic exercises to develop strength, endurance, ROM, and flexibility for 40 minutes including:  Recumbent Bike level 5 x 15 min  Heel Raises x 20  Toe Taps on 6" step x 2 min  Step-ups on 6" step x 10  Heel Cord stretch on wedge 3 x 30 sec  Standing Hip Flex/Abd/Ext x 15 ea = twisted green band   Squats x 10 - needed cueing for these - moved her to using SB on wall for better technique.   Bridges x 10  Ball Squeezes x 2 min  Heel Slides on SB w/strap x 2 min  QS x 2 min  SLR 2  x 10  S/L Hip Abd 2 x 10  SAQ on 1/2 roll x 2 min - 2#       manual therapy techniques:  x 5 mins   Myofascial Release to address right groin pain   Right psoas    Reviewed gait - emphasized need to work on heel to toe pattern with push-off - activation of gluteals on heel strike > push-off. Able to demonstrate improved pattern with cueing.       neuromuscular " re-education activities to improve:  for  minutes. The following activities were included:      therapeutic activities to improve functional performance for   minutes, including:      gait training to improve functional mobility and safety for   minutes, including:      direct contact modalities after being cleared for contraindications:     supervised modalities after being cleared for contradictions:     hot pack for  minutes to .    cold pack for  minutes to .        Patient Education and Home Exercises     Home Exercises Provided and Patient Education Provided     Education provided:       Written Home Exercises Provided: yes. Exercises were reviewed and Saima was able to demonstrate them prior to the end of the session.  Saima demonstrated good  understanding of the education provided. See EMR under Patient Instructions for exercises provided during therapy sessions    ASSESSMENT     Patient did well with exercises and is motivated to get well soon.  The right psoas was very tight and responded well to the release.  We will re-assess results next week.    Saima Is progressing well towards her goals.   Pt prognosis is Good.     Pt will continue to benefit from skilled outpatient physical therapy to address the deficits listed in the problem list box on initial evaluation, provide pt/family education and to maximize pt's level of independence in the home and community environment.     Pt's spiritual, cultural and educational needs considered and pt agreeable to plan of care and goals.     Anticipated barriers to physical therapy: None    Goals:   Short Term Goals: 3 weeks   Reduction in daily pain to no more than 2/10 with improved ability to perform daily activities.  Able to demonstrate full right knee extension to normalize gait pattern.   Able to walk 1/2 mile without increased knee pain or compensation.   Compliant with HEP      Long Term Goals: 6 weeks   0/10 right knee pain with return to higher level  daily activities   Able to walk 1 mile without increased pain/compensation   Able to perform heavy activities at home without increased pain/compensation   FOTO limitation score improved to <=37%   Independent HEP     PLAN     Continue per POC and progress with strengthening/mobility exercises as patient tolerates.     Paul Teague, PTA

## 2022-10-29 NOTE — TELEPHONE ENCOUNTER
Reactive depression  -     DULoxetine (CYMBALTA) 30 MG capsule; Take 1 capsule (30 mg total) by mouth once daily.  Dispense: 30 capsule; Refill: 2    Chronic knee pain, unspecified laterality  -     DULoxetine (CYMBALTA) 30 MG capsule; Take 1 capsule (30 mg total) by mouth once daily.  Dispense: 30 capsule; Refill: 2     Referral has already been sent to psychiatrist. Dr Haas.    I will send prescription for 30 mg of Cymbalta.  One capsule a day.  This also helps with the pain threshold.  May need to go to 60 mg on 90 mg eventually is there is no side effect but no benefit also.  He if unable to see Dr. Haas prior to that, please set up an appointment for my office for continuation of this medication or increase in dosage.  ===View-only below this line===      ----- Message -----       From:Saima Winkler       Sent:10/27/2022  4:34 PM CDT         To:Patient Medical Advice Request Message List    Subject:Psychiatrist referral please     I spoke with someone at [facility] office yesterday and she said if I got a referral from you that I could probably get it appointment by December.  I will take a referral to any psychiatrist that you recommend.   And yes please.  I would like to try an antidepressant.  Tresa tried both lexapro and Zoloft in the past and did not tolerate either.  Could I try something like Effexor or cymbalta?       ----- Message -----       From:Gerardo Buchanan MD       Sent:10/27/2022  4:26 PM CDT         To:Saima Winkler    Subject:Psychiatrist referral please     Do not mind sending a referral.  But do think you would like to have some medications to get you started like Lexapro or Zoloft.  I have sent a referral in any case.    Historically it has been difficult to get an appointment with Dr. 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 Not sure, but think combination of not being at work and pain is causing increased anxiety/depression.Im weaning myself off of pain medication and Im not sleeping at night.I think I would like to see a psychiatrist to get on an antidepressant see if I can get my pain, anxiety, depression and sleeplessness under control.I didnt realize that I needed a referral. Tresa heard good things about Dr. Meseret Haas. I called her office yesterday and they told me I needed a referral from my PCP. Also, unfortunately I think it will probably be sometime in December before theres an opening so Id like to get on that list ASAP.  Please and thank you so much

## 2022-10-31 ENCOUNTER — PATIENT MESSAGE (OUTPATIENT)
Dept: FAMILY MEDICINE | Facility: CLINIC | Age: 52
End: 2022-10-31

## 2022-10-31 ENCOUNTER — TELEPHONE (OUTPATIENT)
Dept: PSYCHIATRY | Facility: CLINIC | Age: 52
End: 2022-10-31
Payer: COMMERCIAL

## 2022-10-31 ENCOUNTER — PATIENT MESSAGE (OUTPATIENT)
Dept: PSYCHIATRY | Facility: CLINIC | Age: 52
End: 2022-10-31
Payer: COMMERCIAL

## 2022-10-31 ENCOUNTER — CLINICAL SUPPORT (OUTPATIENT)
Dept: REHABILITATION | Facility: HOSPITAL | Age: 52
End: 2022-10-31
Payer: COMMERCIAL

## 2022-10-31 DIAGNOSIS — M25.661 KNEE STIFFNESS, RIGHT: ICD-10-CM

## 2022-10-31 DIAGNOSIS — M25.561 ACUTE POSTOPERATIVE PAIN OF RIGHT KNEE: Primary | ICD-10-CM

## 2022-10-31 DIAGNOSIS — G89.18 ACUTE POSTOPERATIVE PAIN OF RIGHT KNEE: Primary | ICD-10-CM

## 2022-10-31 PROCEDURE — 97110 THERAPEUTIC EXERCISES: CPT | Mod: PN,CQ

## 2022-10-31 NOTE — TELEPHONE ENCOUNTER
Called patient to schedule from referral , no answer, left voice message, sent my chart message.

## 2022-10-31 NOTE — PROGRESS NOTES
"[facility]   Physical Therapy Treatment Note     Name: Saima Winkler  Clinic Number: [id number]    Therapy Diagnosis:   Encounter Diagnoses   Name Primary?    Acute postoperative pain of right knee Yes    Knee stiffness, right      Physician: James Urena PA    Visit Date: 10/31/2022    Physician Orders: PT Eval and Treat   Medical Diagnosis from Referral: Primary OA right knee - Right TKA   Evaluation Date: 10/11/2022  Authorization Period Expiration: 12/31/2022  Plan of Care Expiration: 12/31/2022  Visit # / Visits authorized: 9/ 12 (10 total)  FOTO Visit #:  1/3    PTA Visit #: 3/5     Time In: 9:00 AM  Time Out:  9:30 AM  Total Billable Time: 30 minutes    SUBJECTIVE     Pt reports: feeling nauseas and shaky from taking a new medicine (Cymbalta).  She took it around 10 am yesterday and started feeling this was soon after.  She was compliant with home exercise program.  Response to previous treatment: good  Functional change: walking more    Pain: 2 / 10  Location: right knee      OBJECTIVE     Objective Measures updated at progress report unless specified.     Treatment     Saima received the treatments listed below:      therapeutic exercises to develop strength, endurance, ROM, and flexibility for 40 minutes including:  Recumbent Bike level 5 x 15 min  Heel Raises x 20  Toe Taps on 6" step x 2 min  Step-ups on 6" step x 10  Heel Cord stretch on wedge 3 x 30 sec  Standing Hip Flex/Abd/Ext x 15 ea = twisted green band   Squats x 10 - needed cueing for these - moved her to using SB on wall for better technique.   Bridges x 10  Ball Squeezes x 2 min  Heel Slides on SB w/strap x 2 min  QS x 2 min  SLR 2  x 10  S/L Hip Abd 2 x 10  SAQ on 1/2 roll x 2 min - 2#     DNP  manual therapy techniques:  x 0 mins   Myofascial Release to address right groin pain   Right psoas    Reviewed gait - emphasized need to work on heel to toe pattern with push-off - activation of gluteals on heel " strike > push-off. Able to demonstrate improved pattern with cueing.       neuromuscular re-education activities to improve:  for  minutes. The following activities were included:      therapeutic activities to improve functional performance for   minutes, including:      gait training to improve functional mobility and safety for   minutes, including:      direct contact modalities after being cleared for contraindications:     supervised modalities after being cleared for contradictions:     hot pack for  minutes to .    cold pack for  minutes to .        Patient Education and Home Exercises     Home Exercises Provided and Patient Education Provided     Education provided:       Written Home Exercises Provided: yes. Exercises were reviewed and Saima was able to demonstrate them prior to the end of the session.  Saima demonstrated good  understanding of the education provided. See EMR under Patient Instructions for exercises provided during therapy sessions    ASSESSMENT     Patient is sick from a new med today and performed only standing exercises after the bike.      Saima Is progressing well towards her goals.   Pt prognosis is Good.     Pt will continue to benefit from skilled outpatient physical therapy to address the deficits listed in the problem list box on initial evaluation, provide pt/family education and to maximize pt's level of independence in the home and community environment.     Pt's spiritual, cultural and educational needs considered and pt agreeable to plan of care and goals.     Anticipated barriers to physical therapy: None    Goals:   Short Term Goals: 3 weeks   Reduction in daily pain to no more than 2/10 with improved ability to perform daily activities.  Able to demonstrate full right knee extension to normalize gait pattern.   Able to walk 1/2 mile without increased knee pain or compensation.   Compliant with HEP      Long Term Goals: 6 weeks   0/10 right knee pain with return to  higher level daily activities   Able to walk 1 mile without increased pain/compensation   Able to perform heavy activities at home without increased pain/compensation   FOTO limitation score improved to <=37%   Independent HEP     PLAN     Continue per POC and progress with strengthening/mobility exercises as patient tolerates.     Paul Teague, PTA

## 2022-11-01 ENCOUNTER — OFFICE VISIT (OUTPATIENT)
Dept: ORTHOPEDICS | Facility: CLINIC | Age: 52
End: 2022-11-01
Payer: COMMERCIAL

## 2022-11-01 VITALS
WEIGHT: 162 LBS | HEIGHT: 67 IN | DIASTOLIC BLOOD PRESSURE: 90 MMHG | SYSTOLIC BLOOD PRESSURE: 150 MMHG | BODY MASS INDEX: 25.43 KG/M2

## 2022-11-01 DIAGNOSIS — M17.11 PRIMARY OSTEOARTHRITIS OF RIGHT KNEE: ICD-10-CM

## 2022-11-01 DIAGNOSIS — Z96.651 STATUS POST TOTAL KNEE REPLACEMENT, RIGHT: Primary | ICD-10-CM

## 2022-11-01 PROCEDURE — 3077F SYST BP >= 140 MM HG: CPT | Mod: CPTII,S$GLB,, | Performed by: ORTHOPAEDIC SURGERY

## 2022-11-01 PROCEDURE — 1159F MED LIST DOCD IN RCRD: CPT | Mod: CPTII,S$GLB,, | Performed by: ORTHOPAEDIC SURGERY

## 2022-11-01 PROCEDURE — 1160F RVW MEDS BY RX/DR IN RCRD: CPT | Mod: CPTII,S$GLB,, | Performed by: ORTHOPAEDIC SURGERY

## 2022-11-01 PROCEDURE — 99024 PR POST-OP FOLLOW-UP VISIT: ICD-10-PCS | Mod: S$GLB,,, | Performed by: ORTHOPAEDIC SURGERY

## 2022-11-01 PROCEDURE — 1159F PR MEDICATION LIST DOCUMENTED IN MEDICAL RECORD: ICD-10-PCS | Mod: CPTII,S$GLB,, | Performed by: ORTHOPAEDIC SURGERY

## 2022-11-01 PROCEDURE — 1160F PR REVIEW ALL MEDS BY PRESCRIBER/CLIN PHARMACIST DOCUMENTED: ICD-10-PCS | Mod: CPTII,S$GLB,, | Performed by: ORTHOPAEDIC SURGERY

## 2022-11-01 PROCEDURE — 3077F PR MOST RECENT SYSTOLIC BLOOD PRESSURE >= 140 MM HG: ICD-10-PCS | Mod: CPTII,S$GLB,, | Performed by: ORTHOPAEDIC SURGERY

## 2022-11-01 PROCEDURE — 99024 POSTOP FOLLOW-UP VISIT: CPT | Mod: S$GLB,,, | Performed by: ORTHOPAEDIC SURGERY

## 2022-11-01 PROCEDURE — 3080F PR MOST RECENT DIASTOLIC BLOOD PRESSURE >= 90 MM HG: ICD-10-PCS | Mod: CPTII,S$GLB,, | Performed by: ORTHOPAEDIC SURGERY

## 2022-11-01 PROCEDURE — 3080F DIAST BP >= 90 MM HG: CPT | Mod: CPTII,S$GLB,, | Performed by: ORTHOPAEDIC SURGERY

## 2022-11-01 RX ORDER — MELOXICAM 15 MG/1
15 TABLET ORAL DAILY
Qty: 30 TABLET | Refills: 2 | Status: SHIPPED | OUTPATIENT
Start: 2022-11-01 | End: 2023-01-30

## 2022-11-01 RX ORDER — GABAPENTIN 300 MG/1
300 CAPSULE ORAL 2 TIMES DAILY
Qty: 60 CAPSULE | Refills: 0 | Status: SHIPPED | OUTPATIENT
Start: 2022-11-01 | End: 2022-11-01

## 2022-11-01 RX ORDER — GABAPENTIN 300 MG/1
300 CAPSULE ORAL 2 TIMES DAILY
Qty: 60 CAPSULE | Refills: 2 | Status: SHIPPED | OUTPATIENT
Start: 2022-11-01 | End: 2023-01-12

## 2022-11-01 NOTE — PROGRESS NOTES
[facility] ORTHOPEDICS POST-OP NOTE    Subjective:           Chief Complaint:   Chief Complaint   Patient presents with    Right Knee - Post-op Evaluation     PO Rt TKA 09/19/22. States that she is doing ok. Progressing with PT.        Past Medical History:   Diagnosis Date    Anxiety     Depression        Past Surgical History:   Procedure Laterality Date    HYSTERECTOMY      KNEE SURGERY      ROBOTIC ARTHROPLASTY, KNEE Right 9/19/2022    Procedure: ROBOTIC ARTHROPLASTY, KNEE, TOTAL;  Surgeon: Eduar Whittington MD;  Location: [facility] OR;  Service: Orthopedics;  Laterality: Right;    SHOULDER SURGERY      TRANSFORAMINAL EPIDURAL INJECTION OF STEROID Bilateral 6/4/2019    Procedure: Injection,steroid,epidural,transforaminal approach L4-5;  Surgeon: Jeyson Kent MD;  Location: [facility] OR;  Service: Pain Management;  Laterality: Bilateral;    TRANSFORAMINAL EPIDURAL INJECTION OF STEROID Bilateral 7/25/2019    Procedure: Injection,steroid,epidural,transforaminal approach;  Surgeon: Jeyson Kent MD;  Location: [facility] OR;  Service: Pain Management;  Laterality: Bilateral;  L4-5    vaginal sling         Current Outpatient Medications   Medication Sig    cetirizine (ZYRTEC) 10 MG tablet Take 10 mg by mouth once daily.    estradioL (ESTRACE) 1 MG tablet TAKE 1 TABLET BY MOUTH EVERY DAY    gabapentin (NEURONTIN) 300 MG capsule Take 1 capsule (300 mg total) by mouth 2 (two) times daily.    ibuprofen (ADVIL,MOTRIN) 200 MG tablet Take 200 mg by mouth every 6 (six) hours as needed for Pain. 800 mg in am, 200 at noon & 200 at bedtime    aspirin (ECOTRIN) 81 MG EC tablet Take 1 tablet (81 mg total) by mouth every 12 (twelve) hours.    DULoxetine (CYMBALTA) 30 MG capsule Take 1 capsule (30 mg total) by mouth once daily. (Patient not taking: Reported on 11/1/2022)    oxyCODONE-acetaminophen (PERCOCET) 5-325 mg per tablet Take 1 tablet by mouth every 8 (eight) hours as needed for Pain. (Patient not taking: Reported on 11/1/2022)     No current  facility-administered medications for this visit.       Review of patient's allergies indicates:  No Known Allergies    Family History   Problem Relation Age of Onset    Asthma Mother     Depression Mother     Hypertension Mother     Heart disease Father     Depression Father     Hypertension Father        Social History     Socioeconomic History    Marital status:     Number of children: 3   Occupational History    Occupation: Registered Nurse      Employer: [employer]     Comment: Cancer Center   Tobacco Use    Smoking status: Former     Types: Cigarettes     Quit date: 1/1/2007     Years since quitting: 15.8    Smokeless tobacco: Never   Substance and Sexual Activity    Alcohol use: Yes    Drug use: No    Sexual activity: Yes     Partners: Male   Social History Narrative    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       History of present illness:  52-year-old female returns to clinic today status post right total knee arthroplasty done on September 19th.  She is approximately 6 weeks postop.  She is doing well.  She has some occasional pain or discomfort.  Usually after prolonged periods of activity.  She still taking it very easy.  She she is concerned a little bit about her scar, she is having some hypertrophy or some keloid formation.  She has never had a large surgical incision to comment on her skin healing.  She still feels a little hypersensitivity around the surgical site primarily laterally.  And she has a little dimpling of the skin at the proximal incision.      Review of Systems:    Musculoskeletal:  See HPI      Objective:        Physical Examination:    Vital Signs:    Vitals:    11/01/22 0819   BP: (!) 150/90       Body mass index is 25.37 kg/m².    This a well-developed, well nourished patient in no acute distress.  They are alert and oriented and cooperative to examination.          Examination of the right knee, the surgical site is now well healed.   "Skin is dry and intact, no erythema ecchymosis, no signs symptoms of infection.  She does have some mild scar hypertrophy.  She also does have some dimpling at the proximal end of the incision.  I suspect that this is just tight suture material and as it breaks down will resolve.  Terms of range of motion she has full extension 0°, she can flex to 110° easily.  Knee feels stable in both flexion and extension.  Calf soft nontender, straight leg raise negative.  Pertinent New Results:    XRAY Report / Interpretation:   AP and lateral views of the right knee taken today in the office demonstrate a right total knee arthroplasty to be in appropriate position without evidence of hardware failure or loosening.    Assessment/Plan:      Stable status post right total knee arthroplasty.  Patient is only about 6 weeks postop.  She is doing well.  X-rays look fine.  We will start her back on gabapentin for the hypersensitivity, as well as a daily anti-inflammatory.  She did not agree with the Celebrex to put her on Mobic.  We will check her back in 6 weeks.  Continue physical therapy.    James Urena, Physician Assistant, served in the capacity as a "scribe" for this patient encounter.  A "face-to-face" encounter occurred with Dr. Eduar Whittington on this date.  The treatment plan and medical decision-making is outlined above. Patient was seen and examined with a chaperone.     This note was created using Dragon voice recognition software that occasionally misinterpreted phrases or words.        "

## 2022-11-04 ENCOUNTER — CLINICAL SUPPORT (OUTPATIENT)
Dept: REHABILITATION | Facility: HOSPITAL | Age: 52
End: 2022-11-04
Payer: COMMERCIAL

## 2022-11-04 DIAGNOSIS — G89.18 ACUTE POSTOPERATIVE PAIN OF RIGHT KNEE: Primary | ICD-10-CM

## 2022-11-04 DIAGNOSIS — M25.661 KNEE STIFFNESS, RIGHT: ICD-10-CM

## 2022-11-04 DIAGNOSIS — M25.561 ACUTE POSTOPERATIVE PAIN OF RIGHT KNEE: Primary | ICD-10-CM

## 2022-11-04 PROCEDURE — 97014 ELECTRIC STIMULATION THERAPY: CPT | Mod: PN

## 2022-11-04 PROCEDURE — 97140 MANUAL THERAPY 1/> REGIONS: CPT | Mod: PN

## 2022-11-04 PROCEDURE — 97110 THERAPEUTIC EXERCISES: CPT | Mod: PN

## 2022-11-04 NOTE — PROGRESS NOTES
"[facility]   Physical Therapy Treatment Note     Name: Saima Winkler  Clinic Number: [id number]    Therapy Diagnosis:   Encounter Diagnoses   Name Primary?    Acute postoperative pain of right knee Yes    Knee stiffness, right      Physician: James Urena PA    Visit Date: 11/4/2022    Physician Orders: PT Eval and Treat   Medical Diagnosis from Referral: Primary OA right knee - Right TKA   Evaluation Date: 10/11/2022  Authorization Period Expiration: 12/31/2022  Plan of Care Expiration: 12/31/2022  Visit # / Visits authorized: 10 / 12 (11 total)  FOTO Visit #:  3/3 - updated 11/4/2022    PTA Visit #: 0/ 5     Time In: 10:00  AM  Time Out:  11:00   AM  Total Billable Time: 50  minutes    SUBJECTIVE     Pt reports:  doing well this morning; release on psoas done last visit was really helpful, area of greatest knee pain is right at the top of her incision on quad;   She was compliant with home exercise program.  Response to previous treatment: good  Functional change: walking more    Pain: 2 / 10  Location: right knee      OBJECTIVE     Objective Measures updated at progress report unless specified.     Treatment     Saima received the treatments listed below:      therapeutic exercises to develop strength, endurance, ROM, and flexibility for 40 minutes including:  Recumbent Bike level 5 x 15 min  Heel Raises x 20  Toe Taps on 6" step x 2 min  Step-ups on 6" step x 10  Heel Cord stretch on wedge 3 x 30 sec  Standing Hip Flex/Abd/Ext x 15 ea = twisted green band   Squats x 10 - needed cueing for these - moved her to using SB on wall for better technique.   Bridges x 10  Ball Squeezes x 2 min  Heel Slides on SB w/strap x 2 min  QS x 2 min  SLR 2  x 10  S/L Hip Abd 2 x 10  SAQ on 1/2 roll x 2 min - 2#       See EMR under MEDIA for written consent provided 11/4/2022.     Palpation Assessment to determine the necessity for Functional Dry Needling  tenderness along right knee incision " from superior patella to distal quad; Dimpling of quad mm tissue noted, internal stitch causing fascia tightness/pain at site    Saima received the following manual therapy techniques:x 30mins;  IASTM to right quad mm - focus on chicho-incisional tissue . LF-ES using 40/50 mm needles to rectus femoris at 1 fb superior patella, and along incision - both sides - about 1 fb lateral to it. Intensity of stimulation adjusted to patient tolerance- slight rhythmic contraction noted in tissue; improved circulation noted following needling as evidenced by change in tissue color.        Reviewed gait - emphasized need to work on heel to toe pattern with push-off - activation of gluteals on heel strike > push-off. Able to demonstrate improved pattern with cueing.       neuromuscular re-education activities to improve:  for  minutes. The following activities were included:      therapeutic activities to improve functional performance for   minutes, including:          Patient Education and Home Exercises     Home Exercises Provided and Patient Education Provided     Education provided:   -What to expect following dry needling - patient has had this before on lower back with good success.       Written Home Exercises Provided: yes. Exercises were reviewed and Saima was able to demonstrate them prior to the end of the session.  Saima demonstrated good  understanding of the education provided. See EMR under Patient Instructions for exercises provided during therapy sessions    ASSESSMENT     Patient noting improvement in knee discomfort following dry needling, overall strength improving, ROM is good. FOTO score improved to 33% limitation in function.     Saima Is progressing well towards her goals.   Pt prognosis is Good.     Pt will continue to benefit from skilled outpatient physical therapy to address the deficits listed in the problem list box on initial evaluation, provide pt/family education and to maximize pt's level of  independence in the home and community environment.     Pt's spiritual, cultural and educational needs considered and pt agreeable to plan of care and goals.     Anticipated barriers to physical therapy: None    Goals:   Short Term Goals: 3 weeks   Reduction in daily pain to no more than 2/10 with improved ability to perform daily activities.> MET   Able to demonstrate full right knee extension to normalize gait pattern.  > MET   Able to walk 1/2 mile without increased knee pain or compensation. > MET   Compliant with HEP > MET      Long Term Goals: 6 weeks   0/10 right knee pain with return to higher level daily activities   Able to walk 1 mile without increased pain/compensation > MET   Able to perform heavy activities at home without increased pain/compensation  > Ongoing   FOTO limitation score improved to <=37%  > MET - new goal to < 30%   Independent HEP     PLAN     Continue per POC and progress with strengthening/mobility exercises as patient tolerates.     Halima Elkins, PT

## 2022-11-08 ENCOUNTER — CLINICAL SUPPORT (OUTPATIENT)
Dept: REHABILITATION | Facility: HOSPITAL | Age: 52
End: 2022-11-08
Payer: COMMERCIAL

## 2022-11-08 DIAGNOSIS — M25.661 KNEE STIFFNESS, RIGHT: ICD-10-CM

## 2022-11-08 DIAGNOSIS — G89.18 ACUTE POSTOPERATIVE PAIN OF RIGHT KNEE: Primary | ICD-10-CM

## 2022-11-08 DIAGNOSIS — M25.561 ACUTE POSTOPERATIVE PAIN OF RIGHT KNEE: Primary | ICD-10-CM

## 2022-11-08 PROCEDURE — 97110 THERAPEUTIC EXERCISES: CPT | Mod: PN,CQ

## 2022-11-08 NOTE — PROGRESS NOTES
"[facility]   Physical Therapy Treatment Note     Name: Saima Winkler  Clinic Number: [id number]    Therapy Diagnosis:   Encounter Diagnoses   Name Primary?    Acute postoperative pain of right knee Yes    Knee stiffness, right      Physician: James Urena PA    Visit Date: 11/8/2022    Physician Orders: PT Eval and Treat   Medical Diagnosis from Referral: Primary OA right knee - Right TKA   Evaluation Date: 10/11/2022  Authorization Period Expiration: 12/31/2022  Plan of Care Expiration: 12/31/2022  Visit # / Visits authorized: 11 / 12 (12 total)  FOTO Visit #:  3/3 - updated 11/4/2022    PTA Visit #: 1/ 5     Time In: 9:30 AM  Time Out:  10:15 AM  Total Billable Time: 40  minutes    SUBJECTIVE     Pt reports: No new c/o's; doing well; DN really helped  She was compliant with home exercise program.  Response to previous treatment: good  Functional change: walking more    Pain: 1 / 10  Location: right knee      OBJECTIVE     Objective Measures updated at progress report unless specified.     Treatment     Saima received the treatments listed below:      therapeutic exercises to develop strength, endurance, ROM, and flexibility for 40 minutes including:  Recumbent Bike level 5 x 15 min  Heel Raises x 20  Toe Taps on 6" step x 2 min  Step-ups on 6" step x 15  Heel Cord stretch on wedge 3 x 30 sec  Standing Hip Flex/Abd/Ext x 15 ea = twisted green band   Squats x 10 - needed cueing for these - moved her to using SB on wall for better technique.   Bridges x 15  Ball Squeezes x 2 min  Heel Slides on SB w/strap x 2 min  QS x 2 min  SLR 2  x 10  S/L Hip Abd 2 x 10  SAQ on 1/2 roll x 2 min - 2#     DNP  See EMR under MEDIA for written consent provided 11/4/2022.     Palpation Assessment to determine the necessity for Functional Dry Needling  tenderness along right knee incision from superior patella to distal quad; Dimpling of quad mm tissue noted, internal stitch causing fascia " tightness/pain at site    Saima received the following manual therapy techniques:x 30mins;  IASTM to right quad mm - focus on chicho-incisional tissue . LF-ES using 40/50 mm needles to rectus femoris at 1 fb superior patella, and along incision - both sides - about 1 fb lateral to it. Intensity of stimulation adjusted to patient tolerance- slight rhythmic contraction noted in tissue; improved circulation noted following needling as evidenced by change in tissue color.        Reviewed gait - emphasized need to work on heel to toe pattern with push-off - activation of gluteals on heel strike > push-off. Able to demonstrate improved pattern with cueing.       neuromuscular re-education activities to improve:  for  minutes. The following activities were included:      therapeutic activities to improve functional performance for   minutes, including:          Patient Education and Home Exercises     Home Exercises Provided and Patient Education Provided     Education provided:   -What to expect following dry needling - patient has had this before on lower back with good success.       Written Home Exercises Provided: yes. Exercises were reviewed and Saima was able to demonstrate them prior to the end of the session.  Saima demonstrated good  understanding of the education provided. See EMR under Patient Instructions for exercises provided during therapy sessions    ASSESSMENT     Patient noting improvement in knee discomfort following dry needling, overall strength improving, ROM is good. FOTO score improved to 33% limitation in function.     Saima Is progressing well towards her goals.   Pt prognosis is Good.     Pt will continue to benefit from skilled outpatient physical therapy to address the deficits listed in the problem list box on initial evaluation, provide pt/family education and to maximize pt's level of independence in the home and community environment.     Pt's spiritual, cultural and educational needs  considered and pt agreeable to plan of care and goals.     Anticipated barriers to physical therapy: None    Goals:   Short Term Goals: 3 weeks   Reduction in daily pain to no more than 2/10 with improved ability to perform daily activities.> MET   Able to demonstrate full right knee extension to normalize gait pattern.  > MET   Able to walk 1/2 mile without increased knee pain or compensation. > MET   Compliant with HEP > MET      Long Term Goals: 6 weeks   0/10 right knee pain with return to higher level daily activities   Able to walk 1 mile without increased pain/compensation > MET   Able to perform heavy activities at home without increased pain/compensation  > Ongoing   FOTO limitation score improved to <=37%  > MET - new goal to < 30%   Independent HEP     PLAN     Continue per POC and progress with strengthening/mobility exercises as patient tolerates.     Jonathan Favre, PTA

## 2022-11-11 ENCOUNTER — CLINICAL SUPPORT (OUTPATIENT)
Dept: REHABILITATION | Facility: HOSPITAL | Age: 52
End: 2022-11-11
Payer: COMMERCIAL

## 2022-11-11 DIAGNOSIS — M25.661 KNEE STIFFNESS, RIGHT: ICD-10-CM

## 2022-11-11 DIAGNOSIS — M25.561 ACUTE POSTOPERATIVE PAIN OF RIGHT KNEE: Primary | ICD-10-CM

## 2022-11-11 DIAGNOSIS — G89.18 ACUTE POSTOPERATIVE PAIN OF RIGHT KNEE: Primary | ICD-10-CM

## 2022-11-11 PROCEDURE — 97110 THERAPEUTIC EXERCISES: CPT | Mod: PN,CQ

## 2022-11-11 PROCEDURE — 97140 MANUAL THERAPY 1/> REGIONS: CPT | Mod: PN,CQ

## 2022-11-11 NOTE — PROGRESS NOTES
"[facility]   Physical Therapy Treatment Note     Name: Saima Winkler  Clinic Number: [id number]    Therapy Diagnosis:   Encounter Diagnoses   Name Primary?    Acute postoperative pain of right knee Yes    Knee stiffness, right      Physician: James Urena PA    Visit Date: 11/11/2022    Physician Orders: PT Eval and Treat   Medical Diagnosis from Referral: Primary OA right knee - Right TKA   Evaluation Date: 10/11/2022  Authorization Period Expiration: 12/31/2022  Plan of Care Expiration: 12/31/2022  Visit # / Visits authorized: 12 / 18 (13 total)  FOTO Visit #:  3/3 - updated 11/4/2022    PTA Visit #: 2 / 5     Time In: 9:30 AM  Time Out:  11:30 AM  Total Billable Time: 55  minutes    SUBJECTIVE     Pt reports: The top of my incision is giving me trouble again.   She was compliant with home exercise program.  Response to previous treatment: good  Functional change: walking more    Pain: 4-5 / 10  Location: right knee      OBJECTIVE     Objective Measures updated at progress report unless specified.     Treatment     Saima received the treatments listed below:      therapeutic exercises to develop strength, endurance, ROM, and flexibility for 40 minutes including:  Recumbent Bike level 5 x 15 min  Heel Raises x 20  Toe Taps on 6" step x 2 min  FSU on 6" step x 15  LSU on 4" step x 15  Heel Cord stretch on wedge 3 x 30 sec  Standing Hip Flex/Abd/Ext x 15 ea = twisted green band   Squats x 10 - needed cueing for these - moved her to using SB on wall for better technique.   Bridges x 15  Ball Squeezes x 2 min  Heel Slides on SB w/strap x 2 min  QS x 2 min  SLR 2  x 10  S/L Hip Abd 2 x 10  SAQ on 1/2 roll x 2 min - 2#     Performed by Halima Elkins, PT  See EMR under MEDIA for written consent provided 11/4/2022.     Palpation Assessment to determine the necessity for Functional Dry Needling  tenderness along right knee incision from superior patella to distal quad; Dimpling of quad mm " tissue noted, internal stitch causing fascia tightness/pain at site    Saima received the following manual therapy techniques:x 15 mins;  STM to right quad mm - focus on chicho-incisional tissue . LF-ES using 40/50 mm needles to rectus femoris at 1 fb superior patella, and along incision - both sides - about 1 fb lateral to it. Intensity of stimulation adjusted to patient tolerance- slight rhythmic contraction noted in tissue; improved circulation noted following needling as evidenced by change in tissue color.        Reviewed gait - emphasized need to work on heel to toe pattern with push-off - activation of gluteals on heel strike > push-off. Able to demonstrate improved pattern with cueing.       neuromuscular re-education activities to improve:  for  minutes. The following activities were included:      therapeutic activities to improve functional performance for   minutes, including:          Patient Education and Home Exercises     Home Exercises Provided and Patient Education Provided     Education provided:   -What to expect following dry needling - patient has had this before on lower back with good success.       Written Home Exercises Provided: yes. Exercises were reviewed and Saima was able to demonstrate them prior to the end of the session.  Saima demonstrated good  understanding of the education provided. See EMR under Patient Instructions for exercises provided during therapy sessions    ASSESSMENT     Patient noting improvement in knee discomfort following dry needling, overall strength improving, ROM is good.    Saima Is progressing well towards her goals.   Pt prognosis is Good.     Pt will continue to benefit from skilled outpatient physical therapy to address the deficits listed in the problem list box on initial evaluation, provide pt/family education and to maximize pt's level of independence in the home and community environment.     Pt's spiritual, cultural and educational needs considered  and pt agreeable to plan of care and goals.     Anticipated barriers to physical therapy: None    Goals:   Short Term Goals: 3 weeks   Reduction in daily pain to no more than 2/10 with improved ability to perform daily activities.> MET   Able to demonstrate full right knee extension to normalize gait pattern.  > MET   Able to walk 1/2 mile without increased knee pain or compensation. > MET   Compliant with HEP > MET      Long Term Goals: 6 weeks   0/10 right knee pain with return to higher level daily activities   Able to walk 1 mile without increased pain/compensation > MET   Able to perform heavy activities at home without increased pain/compensation  > Ongoing   FOTO limitation score improved to <=37%  > MET - new goal to < 30%   Independent HEP     PLAN     Continue per POC and progress with strengthening/mobility exercises as patient tolerates.     Jonathan Favre, PTA

## 2022-11-14 ENCOUNTER — PATIENT MESSAGE (OUTPATIENT)
Dept: ORTHOPEDICS | Facility: CLINIC | Age: 52
End: 2022-11-14

## 2022-11-23 ENCOUNTER — OFFICE VISIT (OUTPATIENT)
Dept: RHEUMATOLOGY | Facility: CLINIC | Age: 52
End: 2022-11-23
Payer: COMMERCIAL

## 2022-11-23 VITALS — WEIGHT: 164 LBS | DIASTOLIC BLOOD PRESSURE: 82 MMHG | SYSTOLIC BLOOD PRESSURE: 135 MMHG | BODY MASS INDEX: 25.69 KG/M2

## 2022-11-23 DIAGNOSIS — R23.1 LIVEDO RETICULARIS: ICD-10-CM

## 2022-11-23 DIAGNOSIS — M85.89 OTHER SPECIFIED DISORDERS OF BONE DENSITY AND STRUCTURE, MULTIPLE SITES: ICD-10-CM

## 2022-11-23 DIAGNOSIS — M79.7 FIBROMYALGIA: Primary | ICD-10-CM

## 2022-11-23 PROCEDURE — 99213 OFFICE O/P EST LOW 20 MIN: CPT | Mod: S$GLB,,, | Performed by: INTERNAL MEDICINE

## 2022-11-23 PROCEDURE — 3079F PR MOST RECENT DIASTOLIC BLOOD PRESSURE 80-89 MM HG: ICD-10-PCS | Mod: CPTII,S$GLB,, | Performed by: INTERNAL MEDICINE

## 2022-11-23 PROCEDURE — 3079F DIAST BP 80-89 MM HG: CPT | Mod: CPTII,S$GLB,, | Performed by: INTERNAL MEDICINE

## 2022-11-23 PROCEDURE — 1159F PR MEDICATION LIST DOCUMENTED IN MEDICAL RECORD: ICD-10-PCS | Mod: CPTII,S$GLB,, | Performed by: INTERNAL MEDICINE

## 2022-11-23 PROCEDURE — 3008F PR BODY MASS INDEX (BMI) DOCUMENTED: ICD-10-PCS | Mod: CPTII,S$GLB,, | Performed by: INTERNAL MEDICINE

## 2022-11-23 PROCEDURE — 3075F SYST BP GE 130 - 139MM HG: CPT | Mod: CPTII,S$GLB,, | Performed by: INTERNAL MEDICINE

## 2022-11-23 PROCEDURE — 1159F MED LIST DOCD IN RCRD: CPT | Mod: CPTII,S$GLB,, | Performed by: INTERNAL MEDICINE

## 2022-11-23 PROCEDURE — 3008F BODY MASS INDEX DOCD: CPT | Mod: CPTII,S$GLB,, | Performed by: INTERNAL MEDICINE

## 2022-11-23 PROCEDURE — 3075F PR MOST RECENT SYSTOLIC BLOOD PRESS GE 130-139MM HG: ICD-10-PCS | Mod: CPTII,S$GLB,, | Performed by: INTERNAL MEDICINE

## 2022-11-23 PROCEDURE — 99213 PR OFFICE/OUTPT VISIT, EST, LEVL III, 20-29 MIN: ICD-10-PCS | Mod: S$GLB,,, | Performed by: INTERNAL MEDICINE

## 2022-11-23 NOTE — PROGRESS NOTES
[facility]            PROGRESS NOTE      Subjective:       Patient ID:   NAME: Saima Winkler : 1970     52 y.o. female    Referring Doc: No ref. provider found  Other Physicians:    Chief Complaint:  Follow-up      History of Present Illness:     Patient returns today for a regularly scheduled follow-up visit for FU FM    The patient was last seen 2020.  Recovering from right knee replacement.  She was started on gabapentin with relief of her symptoms.  No history of thromboembolic events  No rashes but has noted changes skin particularly lower extremities.  Has occasional Raynaud's  ROS:   GEN:  No  fever, night sweats . weight is stable   No fatigue  SKIN: no rashes, no bruising, no ulcerations, + hx of  Raynaud's  HEENT: no HA's, No visual changes, no mucosal ulcers, no sicca symptoms,  CV:   no CP, SOB, PND, LESTER, no orthopnea, no palpitations  PULM: normal with no SOB, cough, hemoptysis, sputum or pleuritic pain  GI:  no abdominal pain, nausea, vomiting, constipation, diarrhea, melanotic stools, BRBPR, hematemesis, no dysphagia  NEURO: no paresthesias, headaches, visual disturbances, muscle weakness  MUSCULOSKELETAL:no joint swelling, prolonged AM stiffness, no back pain,+ muscle pain  Allergies:  Review of patient's allergies indicates:  No Known Allergies    Medications:    Current Outpatient Medications:     cetirizine (ZYRTEC) 10 MG tablet, Take 10 mg by mouth once daily., Disp: , Rfl:     estradioL (ESTRACE) 1 MG tablet, TAKE 1 TABLET BY MOUTH EVERY DAY, Disp: 90 tablet, Rfl: 0    gabapentin (NEURONTIN) 300 MG capsule, Take 1 capsule (300 mg total) by mouth 2 (two) times daily., Disp: 60 capsule, Rfl: 2    ibuprofen (ADVIL,MOTRIN) 200 MG tablet, Take 200 mg by mouth every 6 (six) hours as needed for Pain. 800 mg in am, 200 at noon & 200 at bedtime, Disp: , Rfl:     meloxicam (MOBIC) 15 MG tablet, Take 1 tablet (15 mg total) by mouth once daily., Disp: 30 tablet, Rfl:  2    PMHx/PSHx Updates:          Objective:     Vitals:  Blood pressure 135/82, weight 74.4 kg (164 lb).    Physical Examination:   GEN: no apparent distress, comfortable; AAOx3  SKIN:  Slight livido on her thighs and legs.  no ulceration, no Raynaud's, no petechiae, no SQ nodules,  HEAD: normal  EYES: no pallor, no icterus  CV: RRR with no murmur; l S1 and S2 reg. ,no gallop no rubs,   CHEST: Normal respiratory effort; CTAB; normal breath sounds; no wheeze or crackles  MUSC/Skeletal: no crepitus; joints without synovitis,  no deformities  No joint swelling or tenderness of PIP, MCP, wrist, elbow, shoulder, or  Lknee joints  EXTREM: no clubbing, cyanosis, no edema,normal  pulses   NEURO: grossly intact; motor WNL; AAOx3;  PSYCH: normal mood, affect and behavior  LYMPH: normal cervical, supraclavicular          Labs:   Lab Results   Component Value Date    WBC 8.55 09/07/2022    HGB 12.2 09/07/2022    HCT 36.8 (L) 09/07/2022    MCV 93 09/07/2022     09/07/2022    CMP  @LASTLAB(NA,K,CL,CO2,GLU,BUN,Creatinine,Calcium,PROT,Albumin,Bilitot,Alkphos,AST,ALT,CRP,ESR,RF,CCP,DARRELL,SSA,CPK,uric acid) )@  I have reviewed all available lab results and radiology reports.    Radiology/Diagnostic Studies:        Assessment/Plan:   (1) 52 y.o. female with diagnosis of history of fibromyalgia.  She is doing better on gabapentin   Slight livedo reticularis.  Will check antiphospholipids.  No history of smoking    Will follow-up with primary care          Discussion:     I have explained all of the above in detail and the patient understands all of the current recommendation(s). I have answered all questions to the best of my ability and to their complete satisfaction.       The patient is to continue with the current management plan         Electronically signed by Brad Hung MD    Patient notified that this office will be closing December 22, 2022. They should begin looking for another rheumatologist as soon as  possible.  A list with the names and contact details of rheumatologists in the surrounding area was provided.

## 2022-11-29 ENCOUNTER — PATIENT MESSAGE (OUTPATIENT)
Dept: PHYSICAL MEDICINE AND REHAB | Facility: CLINIC | Age: 52
End: 2022-11-29

## 2022-11-29 ENCOUNTER — OFFICE VISIT (OUTPATIENT)
Dept: PHYSICAL MEDICINE AND REHAB | Facility: CLINIC | Age: 52
End: 2022-11-29
Payer: COMMERCIAL

## 2022-11-29 VITALS
HEART RATE: 82 BPM | BODY MASS INDEX: 25.74 KG/M2 | SYSTOLIC BLOOD PRESSURE: 140 MMHG | HEIGHT: 67 IN | DIASTOLIC BLOOD PRESSURE: 90 MMHG | WEIGHT: 164 LBS

## 2022-11-29 DIAGNOSIS — M25.559 HIP PAIN: ICD-10-CM

## 2022-11-29 DIAGNOSIS — M53.3 SACROILIAC DYSFUNCTION: Primary | ICD-10-CM

## 2022-11-29 DIAGNOSIS — M79.10 MYALGIA: ICD-10-CM

## 2022-11-29 DIAGNOSIS — M54.50 CHRONIC RIGHT-SIDED LOW BACK PAIN WITHOUT SCIATICA: ICD-10-CM

## 2022-11-29 DIAGNOSIS — G89.29 CHRONIC RIGHT-SIDED LOW BACK PAIN WITHOUT SCIATICA: ICD-10-CM

## 2022-11-29 PROCEDURE — 20552 PR INJECT TRIGGER POINT, 1 OR 2: ICD-10-PCS | Mod: 59,S$GLB,, | Performed by: PHYSICAL MEDICINE & REHABILITATION

## 2022-11-29 PROCEDURE — 99999 PR PBB SHADOW E&M-EST. PATIENT-LVL III: CPT | Mod: PBBFAC,,, | Performed by: PHYSICAL MEDICINE & REHABILITATION

## 2022-11-29 PROCEDURE — 1159F PR MEDICATION LIST DOCUMENTED IN MEDICAL RECORD: ICD-10-PCS | Mod: CPTII,S$GLB,, | Performed by: PHYSICAL MEDICINE & REHABILITATION

## 2022-11-29 PROCEDURE — 1159F MED LIST DOCD IN RCRD: CPT | Mod: CPTII,S$GLB,, | Performed by: PHYSICAL MEDICINE & REHABILITATION

## 2022-11-29 PROCEDURE — 20611 PR DRAIN/ASP/INJECT MAJOR JOINT/BURSA W/US GUIDANCE: ICD-10-PCS | Mod: RT,S$GLB,, | Performed by: PHYSICAL MEDICINE & REHABILITATION

## 2022-11-29 PROCEDURE — 99214 OFFICE O/P EST MOD 30 MIN: CPT | Mod: 25,S$GLB,, | Performed by: PHYSICAL MEDICINE & REHABILITATION

## 2022-11-29 PROCEDURE — 3080F DIAST BP >= 90 MM HG: CPT | Mod: CPTII,S$GLB,, | Performed by: PHYSICAL MEDICINE & REHABILITATION

## 2022-11-29 PROCEDURE — 99999 PR PBB SHADOW E&M-EST. PATIENT-LVL III: ICD-10-PCS | Mod: PBBFAC,,, | Performed by: PHYSICAL MEDICINE & REHABILITATION

## 2022-11-29 PROCEDURE — 20552 NJX 1/MLT TRIGGER POINT 1/2: CPT | Mod: 59,S$GLB,, | Performed by: PHYSICAL MEDICINE & REHABILITATION

## 2022-11-29 PROCEDURE — 3008F BODY MASS INDEX DOCD: CPT | Mod: CPTII,S$GLB,, | Performed by: PHYSICAL MEDICINE & REHABILITATION

## 2022-11-29 PROCEDURE — 3077F PR MOST RECENT SYSTOLIC BLOOD PRESSURE >= 140 MM HG: ICD-10-PCS | Mod: CPTII,S$GLB,, | Performed by: PHYSICAL MEDICINE & REHABILITATION

## 2022-11-29 PROCEDURE — 3008F PR BODY MASS INDEX (BMI) DOCUMENTED: ICD-10-PCS | Mod: CPTII,S$GLB,, | Performed by: PHYSICAL MEDICINE & REHABILITATION

## 2022-11-29 PROCEDURE — 3077F SYST BP >= 140 MM HG: CPT | Mod: CPTII,S$GLB,, | Performed by: PHYSICAL MEDICINE & REHABILITATION

## 2022-11-29 PROCEDURE — 99214 PR OFFICE/OUTPT VISIT, EST, LEVL IV, 30-39 MIN: ICD-10-PCS | Mod: 25,S$GLB,, | Performed by: PHYSICAL MEDICINE & REHABILITATION

## 2022-11-29 PROCEDURE — 20611 DRAIN/INJ JOINT/BURSA W/US: CPT | Mod: RT,S$GLB,, | Performed by: PHYSICAL MEDICINE & REHABILITATION

## 2022-11-29 PROCEDURE — 3080F PR MOST RECENT DIASTOLIC BLOOD PRESSURE >= 90 MM HG: ICD-10-PCS | Mod: CPTII,S$GLB,, | Performed by: PHYSICAL MEDICINE & REHABILITATION

## 2022-11-29 RX ORDER — LIDOCAINE HYDROCHLORIDE 10 MG/ML
1 INJECTION INFILTRATION; PERINEURAL
Status: COMPLETED | OUTPATIENT
Start: 2022-11-29 | End: 2022-11-29

## 2022-11-29 RX ORDER — METHYLPREDNISOLONE ACETATE 40 MG/ML
40 INJECTION, SUSPENSION INTRA-ARTICULAR; INTRALESIONAL; INTRAMUSCULAR; SOFT TISSUE
Status: COMPLETED | OUTPATIENT
Start: 2022-11-29 | End: 2022-11-29

## 2022-11-29 RX ADMIN — LIDOCAINE HYDROCHLORIDE 1 ML: 10 INJECTION INFILTRATION; PERINEURAL at 12:11

## 2022-11-29 RX ADMIN — METHYLPREDNISOLONE ACETATE 40 MG: 40 INJECTION, SUSPENSION INTRA-ARTICULAR; INTRALESIONAL; INTRAMUSCULAR; SOFT TISSUE at 12:11

## 2022-11-29 NOTE — PROGRESS NOTES
HPI:  Patient is a 52 y.o. year old female w. Right hip pain. She has been hurting for several years. It is difficult to go from sitting to standing. It is difficult to walk. Laying on it is very painful. She denies any weakness, frequent falls or any recent trauma. She denies any neuro deficits.     Imaging  MRI lumbar spine without contrast     CLINICAL DATA: Lower back pain.     FINDINGS: Multiplanar noncontrast imaging was performed. Comparison is  made to May 2019.     There is no evidence of lumbar fracture, subluxation, or osseous  destructive lesion. Marrow signal is mildly heterogeneous. Small  vertebral body hemangiomas are noted at L1 and L3, similar to the  prior study. Signal within the conus medullaris is within normal  limits. Multilevel disc desiccation is noted.     T12-L1: No significant abnormalities.     L1-2: Minimal broad-based disc bulging without central canal or  foraminal stenosis, unchanged.     L2-3: Mild broad-based disc bulge results in mild narrowing of the  central canal, without significant foraminal stenosis, unchanged.     L3-4: Mild broad-based disc bulge results in no significant central  canal stenosis. There is mild bilateral foraminal narrowing,  unchanged.     L4-5: There is moderate loss of intervertebral disc height. Mild  broad-based disc/osteophyte complex results in mild central canal  stenosis, unchanged. Mild facet and ligamentum flavum hypertrophy are  also noted. There is mild bilateral left-sided foraminal narrowing  without direct nerve root impingement.     L5-S1: Mild broad-based disc protrusion and small outer annular tear  are noted. There is resultant minor narrowing of the central canal. In  combination with mild facet hypertrophy, there is mild bilateral  foraminal narrowing, without direct nerve root impingement.     IMPRESSION:  1. Mild multilevel lumbar degenerative disc and facet disease as  detailed at each individual level above. There is no evidence  of  high-grade spinal stenosis or direct nerve root impingement. Findings  are similar in appearance to May 8, 2019.    Labs  EGFR cr lfts gluc nl      Past Medical History:   Diagnosis Date    Anxiety     Depression      Past Surgical History:   Procedure Laterality Date    HYSTERECTOMY      KNEE SURGERY      ROBOTIC ARTHROPLASTY, KNEE Right 9/19/2022    Procedure: ROBOTIC ARTHROPLASTY, KNEE, TOTAL;  Surgeon: Eduar Whittington MD;  Location: [facility] OR;  Service: Orthopedics;  Laterality: Right;    SHOULDER SURGERY      TRANSFORAMINAL EPIDURAL INJECTION OF STEROID Bilateral 6/4/2019    Procedure: Injection,steroid,epidural,transforaminal approach L4-5;  Surgeon: Jeyson Kent MD;  [facility];  Service: Pain Management;  Laterality: Bilateral;    TRANSFORAMINAL EPIDURAL INJECTION OF STEROID Bilateral 7/25/2019    Procedure: Injection,steroid,epidural,transforaminal approach;  Surgeon: Jeyson Kent MD;  [facility];  Service: Pain Management;  Laterality: Bilateral;  L4-5    vaginal sling       Family History   Problem Relation Age of Onset    Asthma Mother     Depression Mother     Hypertension Mother     Heart disease Father     Depression Father     Hypertension Father      Social History     Socioeconomic History    Marital status:     Number of children: 3   Occupational History    Occupation: Registered Nurse      Employer: [employer]     Comment: Cancer Center   Tobacco Use    Smoking status: Former     Types: Cigarettes     Quit date: 1/1/2007     Years since quitting: 15.9    Smokeless tobacco: Never   Substance and Sexual Activity    Alcohol use: Yes    Drug use: No    Sexual activity: Yes     Partners: Male   Social History Narrative    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       Review of patient's allergies indicates:  No Known Allergies    Current Outpatient Medications:     cetirizine (ZYRTEC) 10 MG tablet, Take 10 mg by mouth once daily., Disp: , Rfl:      estradioL (ESTRACE) 1 MG tablet, TAKE 1 TABLET BY MOUTH EVERY DAY, Disp: 90 tablet, Rfl: 0    gabapentin (NEURONTIN) 300 MG capsule, Take 1 capsule (300 mg total) by mouth 2 (two) times daily., Disp: 60 capsule, Rfl: 2    meloxicam (MOBIC) 15 MG tablet, Take 1 tablet (15 mg total) by mouth once daily., Disp: 30 tablet, Rfl: 2    ibuprofen (ADVIL,MOTRIN) 200 MG tablet, Take 200 mg by mouth every 6 (six) hours as needed for Pain. 800 mg in am, 200 at noon & 200 at bedtime, Disp: , Rfl:     Current Facility-Administered Medications:     LIDOcaine HCL 10 mg/ml (1%) injection 1 mL, 1 mL, Other, 1 time in Clinic/HOD, Meggan Patterson,     methylPREDNISolone acetate injection 40 mg, 40 mg, Intramuscular, 1 time in Clinic/HOD, Meggan Patterson, DO      Review of Systems.    No nausea, vomiting, fevers, chills , contipation, diarrhea or sweats,no weight change, occ back stiffness, no chest pain, no sob, no change of bowel or bladder habits,no coordination issues     Physical Exam:      Vitals:    11/29/22 1118   BP: (!) 140/90   Pulse: 82       alert and oriented ×4 follows commands answers all questions appropriately,affect wnl  Manual muscle test 5 out of 5 sensation to light touch grossly intact  + excruciate tenderness R sijt's, greater trochanters, and QL  Antalgic gait  -slR modified  babinsky down  No clonus  DTR's symmetric 2+  No C/C/E   + unleveled iliac crests  +standing flexion test  Poor psis mobility      Assessment:  lumbosacropelvic dysfunction likely due to lax si ligaments  Right gluteus medius tendinopathy  Recurrent muscular spasms    Plan:  Physical therapy to focus on pelvic alignment  Will do a trochanteric bursa injection and TPI's today  If she is still having symptoms in a 4-6 wks I recommended that she be assessed by  for percutaneous tenotomy of the right gluteus medius  I have also prescribed baclofen to help w. Her muscular spasms  rtc 4wks.          pROCEDURE NOTE:  Risk  "and benefit of trigger point injections given to pt. Injections performed w. A 1.5" 25G needle after sterile prep w. chlorohexedine, verbal consent obtained . NO complications. Right  Quadratus Lumborum  AND ILIOCOSTALIS were inJected with a total of 3ML of 1% Lidocaine       PROCEDURE NOTE:  Risk and benefit of righT US guided trocanteric bursa injection given to pt. Spinal needle 22 g utilized. Area was first anesthesized w. A wheal 1ml 1% lidocaine. Injection performed after sterile prep w. CHLOROHEXEDINE, verbal consent explained, no complications. Depomedrol 40mg 1ml and lidocaine 1ml were used, US guidance was used since it has been shown to have greater efficiency w. Accurate needle placement.     Ultrasound interpretation was performed prior to the procedure to identify the target and any adjacent neurovascular structures.  Subsequently, interpretation was performed during real- time needle guidance confirming placement. Post- intervention interpretation was also performed confirming appropriate injectate flow and hemostasis.  Images indicating needle placement have been saved in FRM Study Course.      Thank you for this interesting referral-note will be sent via Epic to referring provider ()          "

## 2022-11-30 ENCOUNTER — CLINICAL SUPPORT (OUTPATIENT)
Dept: REHABILITATION | Facility: HOSPITAL | Age: 52
End: 2022-11-30
Payer: COMMERCIAL

## 2022-11-30 ENCOUNTER — TELEPHONE (OUTPATIENT)
Dept: PHYSICAL MEDICINE AND REHAB | Facility: CLINIC | Age: 52
End: 2022-11-30
Payer: COMMERCIAL

## 2022-11-30 ENCOUNTER — HOSPITAL ENCOUNTER (OUTPATIENT)
Dept: RADIOLOGY | Facility: HOSPITAL | Age: 52
Discharge: HOME | End: 2022-11-30
Attending: INTERNAL MEDICINE
Payer: COMMERCIAL

## 2022-11-30 ENCOUNTER — PATIENT MESSAGE (OUTPATIENT)
Dept: PHYSICAL MEDICINE AND REHAB | Facility: CLINIC | Age: 52
End: 2022-11-30
Payer: COMMERCIAL

## 2022-11-30 DIAGNOSIS — R53.1 WEAKNESS: ICD-10-CM

## 2022-11-30 DIAGNOSIS — M85.89 OTHER SPECIFIED DISORDERS OF BONE DENSITY AND STRUCTURE, MULTIPLE SITES: ICD-10-CM

## 2022-11-30 DIAGNOSIS — M53.3 SACROILIAC DYSFUNCTION: ICD-10-CM

## 2022-11-30 PROCEDURE — 97161 PT EVAL LOW COMPLEX 20 MIN: CPT | Mod: PN

## 2022-11-30 PROCEDURE — 77080 DXA BONE DENSITY AXIAL: CPT | Mod: TC,PO

## 2022-11-30 RX ORDER — BACLOFEN 10 MG/1
10 TABLET ORAL NIGHTLY
Qty: 30 TABLET | Refills: 6 | Status: SHIPPED | OUTPATIENT
Start: 2022-11-30 | End: 2023-01-30 | Stop reason: SDUPTHER

## 2022-11-30 RX ORDER — LIDOCAINE HYDROCHLORIDE 10 MG/ML
1 INJECTION INFILTRATION; PERINEURAL
Status: DISCONTINUED | OUTPATIENT
Start: 2022-11-30 | End: 2023-01-30

## 2022-11-30 RX ORDER — METHYLPREDNISOLONE ACETATE 40 MG/ML
40 INJECTION, SUSPENSION INTRA-ARTICULAR; INTRALESIONAL; INTRAMUSCULAR; SOFT TISSUE
Status: DISCONTINUED | OUTPATIENT
Start: 2022-11-30 | End: 2023-01-30

## 2022-11-30 NOTE — PROGRESS NOTES
[facility]  Physical Therapy Initial Evaluation    Name: Saima Winkler  Clinic Number: [id number]    Therapy Diagnosis: No diagnosis found.  Physician: Meggan Patterson,*    Physician Orders: PT Eval and Treat   Medical Diagnosis from Referral: Sacroiliac dysfunction  Evaluation Date: 11/30/2022  Authorization Period Expiration: 11/30/2023  Plan of Care Expiration: 2/28/2023  Visit # / Visits authorized: 1/ 1  FOTO Visit #:  1/1    Time In: ***  Time Out: ***  Total Appointment Time (timed & untimed codes): *** minutes    Precautions: {IP WOUND PRECAUTIONS OHS:34684}    Subjective   Date of onset: ***  History of current condition - Saima reports: Intermittent low back pain over the last few years with recent exacerbation following R TKA.Pt reports a history of Left SI joint pain improved with injections however has started to feel similar pain on the right side. Pain from the low back/gluteal region into the  LE. Pain does not radiate past the knee. Reports ongoing lateral numbness lower leg. C/O continued R LE weakness.     Medical History:   Past Medical History:   Diagnosis Date    Anxiety     Depression        Surgical History:   Saima Winkler  has a past surgical history that includes Hysterectomy; vaginal sling; Shoulder surgery; Knee surgery; Transforaminal epidural injection of steroid (Bilateral, 6/4/2019); Transforaminal epidural injection of steroid (Bilateral, 7/25/2019); and robotic arthroplasty, knee (Right, 9/19/2022).    Medications:   Saima has a current medication list which includes the following prescription(s): cetirizine, estradiol, gabapentin, ibuprofen, and meloxicam.    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 Minimal difficulty with transfers. Difficulty sleeping 2/2 to unable to find a comfortable position in bed.    Pain:  Current 4/10,- pt reports taking gabapentin and Ibuprofen. worst 9/10, best 3/10   Location: {RIGHT LEFT BILATERAL:08796} {LOCATION ON BODY:79172}  Description: Aching, Dull, and Sharp  Aggravating Factors: Laying aggravates the back and Standing Walking aggravates the knee, weather  Easing Factors: massage, pain medication, bowel movement-Back, ice, lying down-Better knee worse on the back, injection, TENS unit, and rest    Pts goals: Perform ADL's and work duties with out discomfort/pain    Objective     Observation: ***    Posture:    -       Posterior pelvic tilt    Gait: Slight antalgic gait on the right    Lumbar Range of Motion:    % of Normal Range Pain   Flexion 63    Extension 10 R low back p!   Left Side Bending 5 stiffness   Right Side Bending 12    Left rotation 50% limited  Stiffness   Right Rotation 25% limited       Lower Extremity Strength   Left Right   Knee extension: 4+/5 4+/5   Knee flexion: 4+/5 4-/5   Hip flexion: 4-/5 4-/5   Hip extension:  3+/5 2-/5   Hip abduction: 3+/5 3+/5   Hip adduction: 3+/5 3+/5   Ankle dorsiflexion: 4+/5 4+/5   Ankle plantarflexion: 4+/5 4+/5   Upper abdominals 3+/5    Lower abdominals 3+/5    Back extensors 3-/5      Special Tests:    Repeated Flexion {POSITIVE/NEGATIVE:53319}   Repeated Extension {POSITIVE/NEGATIVE:79670}   Prone Instability {POSITIVE/NEGATIVE:84007}   Straight Leg Raise {POSITIVE/NEGATIVE:92314}   Slump {POSITIVE/NEGATIVE:52024}   Quadrant {POSITIVE/NEGATIVE:88341}   Femoral nerve test {POSITIVE/NEGATIVE:01702}       DTR:   Left Right Comment   Patellar (L3-4) {Reflexes:57473} {Reflexes:47311}    Achilles (S1) {Reflexes:50281} {Reflexes:66841}         Joint Mobility:   Lumbar: CPA slightly restricted,   RPA  slightly restricted  LPA slightly restricted    Thoracic: restricted    Sacral Evaluation    Standing Forward Flexion: *** PSIS moves first (stuck side)  Standing Stork: *** PSIS with no posterior rotation (stuck side)  Supine to long sit: *** leg changed from (long to short = anterior rotation) / (short to long = posterior rotation)      Palpation: {NO/MINIMAL/MODERATE/SIGNIFICANT:21195} tenderness to palpation at ***    Sensation: ***    Flexibility:     90/90 SLR = R {NO/MINIMAL/MODERATE/SIGNIFICANT:32112} restriction, L {NO/MINIMAL/MODERATE/SIGNIFICANT:60474} restriction   Ely's test: R {NO/MINIMAL/MODERATE/SIGNIFICANT:46013} restriction, L {NO/MINIMAL/MODERATE/SIGNIFICANT:23511} restriction   Keli's test: R {NO/MINIMAL/MODERATE/SIGNIFICANT:14000} restriction, L {NO/MINIMAL/MODERATE/SIGNIFICANT:22599} restriction   Charles test: R {NO/MINIMAL/MODERATE/SIGNIFICANT:99294} restriction, L {NO/MINIMAL/MODERATE/SIGNIFICANT:87040} restriction    PT Evaluation Completed: Yes  Discussed Plan of Care with patient: Yes       Limitation/Restriction for FOTO  LUMBAR Survey    Therapist reviewed FOTO scores for Saima Winkler on 11/30/2022.   FOTO documents entered into [employer] - see Media section.    Limitation Score: 44%         TREATMENT   Treatment Time In: ***  Treatment Time Out: ***  Total Treatment time (time-based codes) separate from Evaluation: *** minutes    Saima received the treatments listed below:    {OTW Treatment:96727}    Home Exercises and Patient Education Provided    Education provided:   - ***    Written Home Exercises Provided: pt instructed to continue with previously provided HEP provided in this facility for R knee/ LE discomfort. Pt educated on bridges and gentle SL quad stretch.Exercises were reviewed and Saima was able to demonstrate them prior to the end of the session.  Saima demonstrated good  understanding of the education  provided.     Assessment   Saima is a 52 y.o. female referred to outpatient Physical Therapy with a medical diagnosis of Sacroiliac Dysfunction. Pt presents with reduction in strength surrounding  the hip Bilaterally, Weakness of the core and paraspinals, Minimal decline in strength Bilateral knees,     Pt prognosis is Good.   Pt will benefit from skilled outpatient Physical Therapy to address the deficits stated above and in the chart below, provide pt/family education, and to maximize pt's level of independence.     Plan of care discussed with patient: Yes  Pt's spiritual, cultural and educational needs considered and patient is agreeable to the plan of care and goals as stated below:     Anticipated Barriers for therapy: None    Medical Necessity is demonstrated by the following  History  Co-morbidities and personal factors that may impact the plan of care Co-morbidities:   Occasional raynaud's, Fibromyalgia, IBS, Hx of SI joint dysfunction Left, Hx of pro    Personal Factors:   {Personal Factors:40851}     {Desc; low/moderate/high:742908}   Examination  Body Structures and Functions, activity limitations and participation restrictions that may impact the plan of care Body Regions:   {Body Regions:23425}    Body Systems:    {Body Systems:82308}    Participation Restrictions:   ***    Activity limitations:   Learning and applying knowledge  {Learning and applying knowledge:33776}    General Tasks and Commands  {Gen tasks and commands:49703}    Communication  {Communication:78032}    Mobility  {Mobility:42926}    Self care  {Self Care:39532}    Domestic Life  {Domestic Life:28783}    Interactions/Relationships  {Interactions/Relationships:94996}    Life Areas  {Life Areas:11524}    Community and Social Life  {Community/Social Life:28957}         {Desc; low/moderate/high:879867}   Clinical Presentation {Clinical Presentation :18771} {Desc; low/moderate/high:145722}   Decision Making/ Complexity Score: {Desc;  "low/moderate/high:092660}     Goals:  Short Term Goals: *** weeks   ***    Long Term Goals: *** weeks   ***    Plan   Plan of care Certification: 11/30/2022 to ***.    Outpatient Physical Therapy {NUMBERS 1-5:27314} times weekly for {0-10:61901::"0"} weeks to include the following interventions: {TX PLAN:90179}.     Dyan Pressley PT     "

## 2022-12-02 PROBLEM — R53.1 WEAKNESS: Status: ACTIVE | Noted: 2022-12-02

## 2022-12-02 NOTE — PLAN OF CARE
[facility]  Physical Therapy Initial Evaluation     Name: Saima Winkler  Clinic Number: [id number]     Therapy Diagnosis: No diagnosis found.  Physician: Meggan Patterson,*     Physician Orders: PT Eval and Treat   Medical Diagnosis from Referral: Sacroiliac dysfunction  Evaluation Date: 11/30/2022  Authorization Period Expiration: 11/30/2023  Plan of Care Expiration: 2/28/2023  Visit # / Visits authorized: 1/ 1  FOTO Visit #:  1/1     Time In: 8:03  Time Out: 8:45  Total Appointment Time (timed & untimed codes): 40 minutes     Precautions: Standard     Subjective   History of current condition - Saima reports: Intermittent low back pain over the last few years with recent exacerbation following R TKA.Pt reports a history of Left SI joint pain improved with injections however has started to feel similar pain on the right side. Pain from the low back/gluteal region into the  LE. Pain does not radiate past the knee. Reports ongoing lateral numbness lower leg. C/O continued R LE weakness.     Medical History:        Past Medical History:   Diagnosis Date    Anxiety      Depression           Surgical History:   Saima Winkler  has a past surgical history that includes Hysterectomy; vaginal sling; Shoulder surgery; Knee surgery; Transforaminal epidural injection of steroid (Bilateral, 6/4/2019); Transforaminal epidural injection of steroid (Bilateral, 7/25/2019); and robotic arthroplasty, knee (Right, 9/19/2022).     Medications:   Saima has a current medication list which includes the following prescription(s): cetirizine, estradiol, gabapentin, ibuprofen, and meloxicam.     Allergies:   Review of patient's allergies indicates:  No Known Allergies      Imaging, none for related referral. 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 Minimal difficulty with transfers. Difficulty sleeping 2/2 to unable to find a comfortable position in bed.     Pain:  Current 4/10,- pt reports taking gabapentin and Ibuprofen. worst 9/10, best 3/10   Location: right back,buttocks and LE  Description: Aching, Dull, and Sharp  Aggravating Factors: Laying aggravates the back and Standing Walking aggravates the knee, weather  Easing Factors: massage, pain medication, bowel movement-Back, ice, lying down-Better knee worse on the back, injection, TENS unit, and rest     Pts goals: Perform ADL's and work duties with out discomfort/pain     Objective    Posture:    -       Posterior pelvic tilt     Gait: Slight antalgic gait on the right     Lumbar Range of Motion:     % of Normal Range Pain   Flexion 63     Extension 10 R low back p!   Left Side Bending 5 stiffness   Right Side Bending 12     Left rotation 50% limited  Stiffness   Right Rotation 25% limited        HIP ROM : WNL Bilaterally  Lower Extremity Strength    Left Right   Knee extension: 4+/5 4+/5   Knee flexion: 4+/5 4-/5   Hip flexion: 4-/5 4-/5   Hip extension:  3+/5 2-/5   Hip abduction: 3+/5 3+/5   Hip adduction: 3+/5 3+/5   Ankle dorsiflexion: 4+/5 4+/5   Ankle plantarflexion: 4+/5 4+/5   Upper abdominals 3+/5     Lower abdominals 3+/5     Back extensors 3-/5        Special Tests:     Repeated Flexion Negative   Repeated Extension Negative   Straight Leg Raise Negative   Slump Negative-Tingling down the back of Right LE   Femoral nerve test Negative      Righ hip scour Negative  Right JESSICA Positive     Joint Mobility:   Lumbar: CPA slightly restricted,   RPA  slightly restricted  LPA slightly restricted     Thoracic: restricted     Sacral Evaluation  Supine to long sit: R leg changed from short to slightly longer  = posterior rotation  Leg length discrepancy approx .5 inch     Compression: Negative  Distraction :Negative  Thigh Thrust: Positive    Palpation: minimal tenderness to palpation at R glute      Flexibility:                90/90 SLR = R no restriction, L no restriction              Ely's test: R minimal restriction, L no restriction              Keli's test: R minimal restriction, L no restriction              Charles test: R no restriction, L no restriction     PT Evaluation Completed: Yes  Discussed Plan of Care with patient: Yes         Limitation/Restriction for FOTO  LUMBAR Survey     Therapist reviewed FOTO scores for Saima Winkler on 11/30/2022.   FOTO documents entered into [employer] - see Media section.     Limitation Score: 44%            TREATMENT   Home Exercises and Patient Education Provided     Education provided:   - POC and goals     Written Home Exercises Provided: pt instructed to continue with previously provided HEP provided in this facility for R knee/ LE discomfort. Pt educated on bridges and gentle SL quad stretch.Exercises were reviewed and Saima was able to demonstrate them prior to the end of the session.  Saima demonstrated good  understanding of the education provided.      Assessment   Saima is a 52 y.o. female referred to outpatient Physical Therapy with a medical diagnosis of Sacroiliac Dysfunction. Pt presents with reduction in strength surrounding  the hip Bilaterally extending to the R LE, Weakness of the core and paraspinals, slight decline in Right quad flexibility. Lumbar stiffness with slight restrictions with PA glides and lateral rotations. 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   Pt prognosis is Good.   Pt will benefit from skilled outpatient Physical Therapy to address the deficits stated above and in the chart below, provide pt/family education, and to maximize pt's level of independence.      Plan of care discussed with patient: Yes  Pt's spiritual,  cultural and educational needs considered and patient is agreeable to the plan of care and goals as stated below:      Anticipated Barriers for therapy: None     Medical Necessity is demonstrated by the following  History  Co-morbidities and personal factors that may impact the plan of care Co-morbidities:   Occasional raynaud's, Fibromyalgia, IBS, Hx of SI joint dysfunction Left     Personal Factors:   no deficits       moderate   Examination  Body Structures and Functions, activity limitations and participation restrictions that may impact the plan of care Body Regions:   lower extremities  trunk     Body Systems:    gross symmetry  ROM  strength  gait  transfers  skin integrity     Participation Restrictions:   None     Activity limitations:   Learning and applying knowledge  no deficits     General Tasks and Commands  no deficits     Communication  no deficits     Mobility  no deficits     Self care  no deficits     Domestic Life  no deficits     Interactions/Relationships  no deficits     Life Areas  no deficits     Community and Social Life  community life  recreation and leisure             moderate   Clinical Presentation stable and uncomplicated low   Decision Making/ Complexity Score: low      Goals:  Long Term Goals: 6 weeks   0/10 right LE and Hip pain with return to higher level daily activities   Able to walk 1 mile without increased pain/compensation   Able to perform heavy activities at home without increased pain/compensation   FOTO limitation score improved to <=15%   Independent HEP      Plan   Plan of care Certification: 11/30/2022 to 2/28/2023.     Outpatient Physical Therapy 2 times weekly for 6 weeks to include the following interventions: Aquatic Therapy, Electrical Stimulation PRN IFC, Manual Therapy, Neuromuscular Re-ed, Therapeutic Activities, Therapeutic Exercise, Dry needling , Ultrasound and other modalities.      Dyan Pressley, PT

## 2022-12-03 NOTE — PLAN OF CARE
PT/PTA met face to face to discuss pt's treatment plan and progress towards established goals. Pt will be seen by a physical therapist minimally every 6th visit or every 30 days.    Please see Plan of Care dated 11/30/22 for goals.    Focus on core, post chain, LE (Hip and knee) , and pelvic strengthening. Manual tech for SI joint dysfunction and Leg length discrepancy. Modalities for pain.     Dyan Pressley, PT

## 2022-12-06 ENCOUNTER — DOCUMENTATION ONLY (OUTPATIENT)
Dept: REHABILITATION | Facility: HOSPITAL | Age: 52
End: 2022-12-06

## 2022-12-06 ENCOUNTER — CLINICAL SUPPORT (OUTPATIENT)
Dept: REHABILITATION | Facility: HOSPITAL | Age: 52
End: 2022-12-06
Payer: COMMERCIAL

## 2022-12-06 DIAGNOSIS — R53.1 WEAKNESS: Primary | ICD-10-CM

## 2022-12-06 PROCEDURE — 97140 MANUAL THERAPY 1/> REGIONS: CPT | Mod: PN

## 2022-12-06 PROCEDURE — 97110 THERAPEUTIC EXERCISES: CPT | Mod: PN

## 2022-12-06 NOTE — PROGRESS NOTES
[facility]  Physical Therapy  Discharge Note    Name: Saima Winkler  Clinic Number: [id number]    Therapy Diagnosis: No diagnosis found.  Physician: No ref. provider found    Physician Orders: Eval and Treat   Medical Diagnosis: Primary OA right knee, Right TKA   Evaluation Date: 10/11/2022      Date of Last visit: 11/11/2022  Total Visits Received: 13    ASSESSMENT      Saima was demonstrating functional AROM in her right knee, functional strength in her RLE; She continued to have pain at the proximal portion of her surgical incision from superior patella to distal quad; She demonstrated fascia tightness and pain in the distal quad mm as well as at the proximal portion of the incision with dimpling of the quad mm tissue from an internal stitch.   Saima suspended her Physical therapy as she was going on vacation over the thanksgiving holidays.     She returned to [facility] with new orders from Dr. Lloyd for treatment of SI jt dysfunction - recent exacerbation of a chronic problem.    Physical Therapy treatment under the orders of Dr. Eduar Whittington will be terminated at this time.  Patient will now be under the care of Dr. Lloyd and a new P.T. evaluation will be done.       Discharge reason: Other:  New problem, new referral from another physician.  Treatment for any ongoing right knee problems will be incorporated into her new treatment plan under Dr. Lloyd.     Discharge FOTO Score: 37% limitation     Goals: Progress at the time of her last scheduled visit       PLANLong Term Goals: 6 weeks   0/10 right knee pain with return to higher level daily activities   Able to walk 1 mile without increased pain/compensation > MET   Able to perform heavy activities at home without increased pain/compensation  > Ongoing   FOTO limitation score improved to <=37%  > MET - new goal to < 30%   Independent HEP       This patient is discharged from Physical Therapy      Halima Elkins, PT

## 2022-12-06 NOTE — PROGRESS NOTES
"  [facility]   Physical Therapy Treatment Note     Name: Saima Winkler  Clinic Number: [id number]    Therapy Diagnosis:   Encounter Diagnosis   Name Primary?    Weakness Yes     Physician: Meggan Patterson,*    Visit Date: 12/6/2022    Physician Orders: PT Eval and Treat   Medical Diagnosis from Referral: Sacroiliac dysfunction  Evaluation Date: 11/30/2022  Authorization Period Expiration: 11/30/2023  Plan of Care Expiration: 2/28/2023  Visit # / Visits authorized: 1/ 12  FOTO Visit #:  1/1    PTA Visit #: 0/5     Time In: 8:50  Time Out: 9:30  Total Billable Time: 40 minutes    SUBJECTIVE     Pt reports: Significant discomfort  R LE OVER the weekend after prolonged standing and activity at her family reunion.  She was compliant with home exercise program.  Response to previous treatment:Improving R SI pain  Functional change: Some days are better than other    Pain: 0/10  Location: right SI and knee    OBJECTIVE     Objective Measures updated at progress report unless specified.     Treatment     Saima received the treatments listed below:      therapeutic exercises to develop strength, endurance, ROM, flexibility, and posture for 25 minutes including:  Nustep  15 mins for repetitive knee flexion and LE reciprocal movement needed for Normalized gait pattern with increased stabilization of the pelvis and endurance.  Pelvic tilts x 15  Clams x 15  FSU on 6" step x 15  LSU on 4" step x 15  Bridges x 15  Ball squeeze x 15  Butterfly stretch x 30 sec    manual therapy techniques: Myofacial release were applied to the:Right Psoas and piriformis for 15 minutes      Patient Education and Home Exercises     Home Exercises Provided and Patient Education Provided     Education provided:   - Shoe lifts    Written Home Exercises Provided: Patient instructed to cont prior HEP. Exercises were reviewed and Saima was able to demonstrate them prior to the end of the session.  Saima demonstrated " good  understanding of the education provided. See EMR under Patient Instructions for exercises provided during therapy sessions    ASSESSMENT     Increased muscular discomfort surrounding the hip and pelvis following clams. MFR performed with relief of groin and post hip pain with improved ambulation. No adverse response to treatment.    Saima Is progressing well towards her goals.   Pt prognosis is Good.     Pt will continue to benefit from skilled outpatient physical therapy to address the deficits listed in the problem list box on initial evaluation, provide pt/family education and to maximize pt's level of independence in the home and community environment.     Pt's spiritual, cultural and educational needs considered and pt agreeable to plan of care and goals.     Anticipated barriers to physical therapy: None    Goals: Goals:  Long Term Goals: 6 weeks   0/10 right LE and Hip pain with return to higher level daily activities   Able to walk 1 mile without increased pain/compensation   Able to perform heavy activities at home without increased pain/compensation   FOTO limitation score improved to <=15%   Independent HEP     PLAN     Will continue to advance as  tolerated.    Dyan Pressley, PT

## 2022-12-09 ENCOUNTER — CLINICAL SUPPORT (OUTPATIENT)
Dept: REHABILITATION | Facility: HOSPITAL | Age: 52
End: 2022-12-09
Payer: COMMERCIAL

## 2022-12-09 DIAGNOSIS — R53.1 WEAKNESS: Primary | ICD-10-CM

## 2022-12-09 PROCEDURE — 97110 THERAPEUTIC EXERCISES: CPT | Mod: PN

## 2022-12-09 PROCEDURE — 97140 MANUAL THERAPY 1/> REGIONS: CPT | Mod: PN

## 2022-12-09 NOTE — PROGRESS NOTES
"      [facility]   Physical Therapy Treatment Note     Name: Saima Winkler  Clinic Number: [id number]    Therapy Diagnosis:   Encounter Diagnosis   Name Primary?    Weakness Yes     Physician: Meggan Patterson,*    Visit Date: 12/9/2022    Physician Orders: PT Eval and Treat   Medical Diagnosis from Referral: Sacroiliac dysfunction  Evaluation Date: 11/30/2022  Authorization Period Expiration: 11/30/2023  Plan of Care Expiration: 2/28/2023  Visit # / Visits authorized: 2/ 12  FOTO Visit #:  1/1    PTA Visit #: 0/5     Time In: 8:45  Time Out: 9:30  Total Billable Time: 45 minutes    SUBJECTIVE     Pt reports: pt reports the provided stretches have been helping.She feels good with pain mostly noted in the right groin.  She was compliant with home exercise program.  Response to previous treatment:Salvador SI joint pain  Functional change: Improved ability to walk    Pain: 2/10  Location: Right groin    OBJECTIVE     Objective Measures updated at progress report unless specified.     Treatment     Saima received the treatments listed below:      therapeutic exercises to develop strength, endurance, ROM, flexibility, and posture for 25 minutes including:  Nustep  15 mins level 5 to 6  for repetitive knee flexion and LE reciprocal movement needed for Normalized gait pattern with increased stabilization of the pelvis and endurance.  Pelvic tilts x 20  Clams x 20 YTB  FSU on 6" step x 15  LSU on 4" step x 15  Bridges x 15  Ball squeeze x 15  Butterfly stretch x 30 sec    manual therapy techniques: Myofacial release were applied to the:Right and left Psoas and piriformis for 15 minutes      Patient Education and Home Exercises     Home Exercises Provided and Patient Education Provided     Education provided:   - Psoas release with tennis ball. Added to HEP    Written Home Exercises Provided: Patient instructed to cont prior HEP. Exercises were reviewed and Saima was able to demonstrate them " prior to the end of the session.  Saima demonstrated good  understanding of the education provided. See EMR under Patient Instructions for exercises provided during therapy sessions    ASSESSMENT     Session began with MFR Megan piriformis performed with relief of groin and post hip pain with improved ambulation. Tightness R> L .pt completed all TE with out increased pain. No adverse response to treatment.    Saima Is progressing well towards her goals.   Pt prognosis is Good.     Pt will continue to benefit from skilled outpatient physical therapy to address the deficits listed in the problem list box on initial evaluation, provide pt/family education and to maximize pt's level of independence in the home and community environment.     Pt's spiritual, cultural and educational needs considered and pt agreeable to plan of care and goals.     Anticipated barriers to physical therapy: None    Goals: Goals:  Long Term Goals: 6 weeks   0/10 right LE and Hip pain with return to higher level daily activities   Able to walk 1 mile without increased pain/compensation   Able to perform heavy activities at home without increased pain/compensation   FOTO limitation score improved to <=15%   Independent HEP     PLAN     Will continue to advance as  tolerated. Consider adding lateral hip hikes.    Dyan Pressley, PT

## 2022-12-12 ENCOUNTER — CLINICAL SUPPORT (OUTPATIENT)
Dept: REHABILITATION | Facility: HOSPITAL | Age: 52
End: 2022-12-12
Payer: COMMERCIAL

## 2022-12-12 DIAGNOSIS — R53.1 WEAKNESS: Primary | ICD-10-CM

## 2022-12-12 PROCEDURE — 97110 THERAPEUTIC EXERCISES: CPT | Mod: PN,CQ

## 2022-12-12 NOTE — PROGRESS NOTES
"      [facility]   Physical Therapy Treatment Note     Name: Saima Winkler  Clinic Number: [id number]    Therapy Diagnosis:   Encounter Diagnosis   Name Primary?    Weakness Yes     Physician: Meggan Patterson,*    Visit Date: 12/12/2022    Physician Orders: PT Eval and Treat   Medical Diagnosis from Referral: Sacroiliac dysfunction  Evaluation Date: 11/30/2022  Authorization Period Expiration: 11/30/2023  Plan of Care Expiration: 2/28/2023  Visit # / Visits authorized: 3 / 12  FOTO Visit #:  1/1    PTA Visit #: 1/5     Time In: 8:00 AM  Time Out: 8:40 AM  Total Billable Time: 35 minutes    SUBJECTIVE     Pt reports: the provided stretches have been helping. She feels good. I am going back to work Wednesday, so I have to make today my last day.   She was compliant with home exercise program.  Response to previous treatment:Salvador SI joint pain  Functional change: Improved ability to walk    Pain: 0/10  Location: Right groin    OBJECTIVE     Objective Measures updated at progress report unless specified.     Treatment     Saima received the treatments listed below:      therapeutic exercises to develop strength, endurance, ROM, flexibility, and posture for 25 minutes including:  Nustep x 15 mins level 5 for repetitive knee flexion and LE reciprocal movement needed for Normalized gait pattern with increased stabilization of the pelvis and endurance.  Pelvic tilts x 20  Clams x 20 RTB  FSU on 6" step x 15  LSU on 4" step x 15  Hip Hikes x 15 ea  Bridges x 15  Ball squeeze x 2 min  Butterfly stretch x 30 sec    manual therapy techniques: Myofacial release were applied to the:Right and left Psoas and piriformis for 15 minutes      Patient Education and Home Exercises     Home Exercises Provided and Patient Education Provided     Education provided:   - Psoas release with tennis ball. Added to HEP    Written Home Exercises Provided: Patient instructed to cont prior HEP. Exercises were " reviewed and Saima was able to demonstrate them prior to the end of the session.  Saima demonstrated good  understanding of the education provided. See EMR under Patient Instructions for exercises provided during therapy sessions    ASSESSMENT     Patient is doing well; no pain/discomfort with activities; ready to return to work and D/C to independent exercise program.     Saima Is progressing well towards her goals.   Pt prognosis is Good.     Pt will continue to benefit from skilled outpatient physical therapy to address the deficits listed in the problem list box on initial evaluation, provide pt/family education and to maximize pt's level of independence in the home and community environment.     Pt's spiritual, cultural and educational needs considered and pt agreeable to plan of care and goals.     Anticipated barriers to physical therapy: None    Goals: Goals:  Long Term Goals: 6 weeks   0/10 right LE and Hip pain with return to higher level daily activities   Able to walk 1 mile without increased pain/compensation   Able to perform heavy activities at home without increased pain/compensation   FOTO limitation score improved to <=15%   Independent HEP     PLAN     Patient to return to work. Recommend D/C from skilled PT at this time.     Jonathan Favre, PTA

## 2022-12-13 ENCOUNTER — OFFICE VISIT (OUTPATIENT)
Dept: ORTHOPEDICS | Facility: CLINIC | Age: 52
End: 2022-12-13
Payer: COMMERCIAL

## 2022-12-13 ENCOUNTER — PATIENT MESSAGE (OUTPATIENT)
Dept: ORTHOPEDICS | Facility: CLINIC | Age: 52
End: 2022-12-13

## 2022-12-13 VITALS
SYSTOLIC BLOOD PRESSURE: 124 MMHG | WEIGHT: 164 LBS | DIASTOLIC BLOOD PRESSURE: 84 MMHG | HEIGHT: 67 IN | BODY MASS INDEX: 25.74 KG/M2

## 2022-12-13 DIAGNOSIS — Z96.651 STATUS POST TOTAL KNEE REPLACEMENT, RIGHT: Primary | ICD-10-CM

## 2022-12-13 PROCEDURE — 99024 POSTOP FOLLOW-UP VISIT: CPT | Mod: S$GLB,,, | Performed by: ORTHOPAEDIC SURGERY

## 2022-12-13 PROCEDURE — 1160F RVW MEDS BY RX/DR IN RCRD: CPT | Mod: CPTII,S$GLB,, | Performed by: ORTHOPAEDIC SURGERY

## 2022-12-13 PROCEDURE — 1160F PR REVIEW ALL MEDS BY PRESCRIBER/CLIN PHARMACIST DOCUMENTED: ICD-10-PCS | Mod: CPTII,S$GLB,, | Performed by: ORTHOPAEDIC SURGERY

## 2022-12-13 PROCEDURE — 3074F PR MOST RECENT SYSTOLIC BLOOD PRESSURE < 130 MM HG: ICD-10-PCS | Mod: CPTII,S$GLB,, | Performed by: ORTHOPAEDIC SURGERY

## 2022-12-13 PROCEDURE — 1159F MED LIST DOCD IN RCRD: CPT | Mod: CPTII,S$GLB,, | Performed by: ORTHOPAEDIC SURGERY

## 2022-12-13 PROCEDURE — 3079F PR MOST RECENT DIASTOLIC BLOOD PRESSURE 80-89 MM HG: ICD-10-PCS | Mod: CPTII,S$GLB,, | Performed by: ORTHOPAEDIC SURGERY

## 2022-12-13 PROCEDURE — 1159F PR MEDICATION LIST DOCUMENTED IN MEDICAL RECORD: ICD-10-PCS | Mod: CPTII,S$GLB,, | Performed by: ORTHOPAEDIC SURGERY

## 2022-12-13 PROCEDURE — 3074F SYST BP LT 130 MM HG: CPT | Mod: CPTII,S$GLB,, | Performed by: ORTHOPAEDIC SURGERY

## 2022-12-13 PROCEDURE — 3079F DIAST BP 80-89 MM HG: CPT | Mod: CPTII,S$GLB,, | Performed by: ORTHOPAEDIC SURGERY

## 2022-12-13 PROCEDURE — 99024 PR POST-OP FOLLOW-UP VISIT: ICD-10-PCS | Mod: S$GLB,,, | Performed by: ORTHOPAEDIC SURGERY

## 2022-12-13 PROCEDURE — 3008F PR BODY MASS INDEX (BMI) DOCUMENTED: ICD-10-PCS | Mod: CPTII,S$GLB,, | Performed by: ORTHOPAEDIC SURGERY

## 2022-12-13 PROCEDURE — 3008F BODY MASS INDEX DOCD: CPT | Mod: CPTII,S$GLB,, | Performed by: ORTHOPAEDIC SURGERY

## 2022-12-13 RX ORDER — ADHESIVE BANDAGE
BANDAGE TOPICAL
COMMUNITY
Start: 2022-10-01

## 2022-12-13 NOTE — LETTER
December 13, 2022      [facility]  [address]  [place] [address]  Phone: [phone]  Fax: [phone]       Patient: Saima Winkler   YOB: 1970  Date of Visit: 12/13/2022    To Whom It May Concern:    Nay Winkler  was at our office on 12/13/2022. The patient may return to work on 12/15/2022 With no restrictions. If you have any questions or concerns, or if I can be of further assistance, please do not hesitate to contact me.    Sincerely,          Eduar Whittington MD

## 2022-12-13 NOTE — PROGRESS NOTES
[facility] POST-OP NOTE    Subjective:           Chief Complaint:   Chief Complaint   Patient presents with    Right Knee - Post-op Evaluation     PO Right knee TKA 9/19/2022, doing good. PT just finished yesterday,        Past Medical History:   Diagnosis Date    Anxiety     Depression        Past Surgical History:   Procedure Laterality Date    HYSTERECTOMY      KNEE SURGERY      ROBOTIC ARTHROPLASTY, KNEE Right 9/19/2022    Procedure: ROBOTIC ARTHROPLASTY, KNEE, TOTAL;  Surgeon: Eduar Whittington MD;  Location: [facility] OR;  Service: Orthopedics;  Laterality: Right;    SHOULDER SURGERY      TRANSFORAMINAL EPIDURAL INJECTION OF STEROID Bilateral 6/4/2019    Procedure: Injection,steroid,epidural,transforaminal approach L4-5;  Surgeon: Jeyson Kent MD;  Location: [facility] OR;  Service: Pain Management;  Laterality: Bilateral;    TRANSFORAMINAL EPIDURAL INJECTION OF STEROID Bilateral 7/25/2019    Procedure: Injection,steroid,epidural,transforaminal approach;  Surgeon: Jeyson Kent MD;  Location: [facility] OR;  Service: Pain Management;  Laterality: Bilateral;  L4-5    vaginal sling         Current Outpatient Medications   Medication Sig    baclofen (LIORESAL) 10 MG tablet Take 1 tablet (10 mg total) by mouth every evening.    calcium carbonate/vitamin D3 (CALTRATE 600 + D ORAL)     cetirizine (ZYRTEC) 10 MG tablet Take 10 mg by mouth once daily.    estradioL (ESTRACE) 1 MG tablet TAKE 1 TABLET BY MOUTH EVERY DAY    gabapentin (NEURONTIN) 300 MG capsule Take 1 capsule (300 mg total) by mouth 2 (two) times daily.    magnesium hydroxide 400 mg/5 ml (MILK OF MAGNESIA) 400 mg/5 mL Susp     meloxicam (MOBIC) 15 MG tablet Take 1 tablet (15 mg total) by mouth once daily.    ibuprofen (ADVIL,MOTRIN) 200 MG tablet Take 200 mg by mouth every 6 (six) hours as needed for Pain. 800 mg in am, 200 at noon & 200 at bedtime     Current Facility-Administered Medications   Medication    LIDOcaine HCL 10 mg/ml (1%) injection 1 mL     methylPREDNISolone acetate injection 40 mg       Review of patient's allergies indicates:  No Known Allergies    Family History   Problem Relation Age of Onset    Asthma Mother     Depression Mother     Hypertension Mother     Heart disease Father     Depression Father     Hypertension Father        Social History     Socioeconomic History    Marital status:     Number of children: 3   Occupational History    Occupation: Registered Nurse      Employer: [employer]     Comment: Cancer Center   Tobacco Use    Smoking status: Former     Types: Cigarettes     Quit date: 1/1/2007     Years since quitting: 15.9    Smokeless tobacco: Never   Substance and Sexual Activity    Alcohol use: Yes    Drug use: No    Sexual activity: Yes     Partners: Male   Social History Narrative    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       History of present illness:  Saima comes in today approximally 3 months status post right total knee arthroplasty.  She is doing very well.  She has minimal pain.  She has completed her physical therapy.  She is ready to be released to return to work duties without restriction.    Review of Systems:    Musculoskeletal:  See HPI      Objective:        Physical Examination:    Vital Signs:    Vitals:    12/13/22 0857   BP: 124/84       Body mass index is 25.69 kg/m².    This a well-developed, well nourished patient in no acute distress.  They are alert and oriented and cooperative to examination.        Right knee exam:  Skin to her right knee is clean dry and intact.  There is no erythema or ecchymosis.  There are no signs or symptoms of infection.  Right knee anterior midline incision is well healed without wound dehiscence or drainage.  Right calf is soft and nontender.  Right knee active range of motion is approximately 0-110 degrees.  Right knee is stable to varus and valgus stresses while held in extension.  She can weightbear as tolerated right lower  "extremity has a nonantalgic gait.  She has a negative straight leg raise on the right.  She is well-preserved internal rotation of her right hip without pain.      Pertinent New Results:    XRAY Report / Interpretation:   Three views of the right knee today:  AP, lateral, and sunrise views.  They reveal no acute fractures or dislocations.  Patient has an anatomically placed right total knee arthroplasty without evidence of hardware failure.    Assessment/Plan:      1. Status post right total knee arthroplasty.      Patient is doing quite well.  She has completed therapy.  She will be released to return to her regular work duties without restriction beginning Thursday December 15, 2022.  She can follow up with us on an as-needed basis.    Kit An, Physician Assistant, served in the capacity as a "scribe" for this patient encounter.  A "face-to-face" encounter occurred with Dr. Eduar Whittington on this date.  The treatment plan and medical decision-making is outlined above. Patient was seen and examined with a chaperone.     This note was created using Dragon voice recognition software that occasionally misinterpreted phrases or words.        "

## 2022-12-30 ENCOUNTER — PATIENT MESSAGE (OUTPATIENT)
Dept: PSYCHIATRY | Facility: CLINIC | Age: 52
End: 2022-12-30
Payer: COMMERCIAL

## 2023-01-03 ENCOUNTER — TELEPHONE (OUTPATIENT)
Dept: ORTHOPEDICS | Facility: CLINIC | Age: 53
End: 2023-01-03

## 2023-01-12 ENCOUNTER — OFFICE VISIT (OUTPATIENT)
Dept: RHEUMATOLOGY | Facility: CLINIC | Age: 53
End: 2023-01-12
Payer: COMMERCIAL

## 2023-01-12 VITALS
HEART RATE: 89 BPM | WEIGHT: 170.19 LBS | SYSTOLIC BLOOD PRESSURE: 154 MMHG | DIASTOLIC BLOOD PRESSURE: 95 MMHG | BODY MASS INDEX: 26.71 KG/M2 | HEIGHT: 67 IN

## 2023-01-12 DIAGNOSIS — M79.7 FIBROMYALGIA: ICD-10-CM

## 2023-01-12 DIAGNOSIS — K58.1 IRRITABLE BOWEL SYNDROME WITH CONSTIPATION: ICD-10-CM

## 2023-01-12 DIAGNOSIS — G47.9 SLEEP DISORDER: Primary | ICD-10-CM

## 2023-01-12 DIAGNOSIS — M54.16 LUMBAR RADICULOPATHY: ICD-10-CM

## 2023-01-12 DIAGNOSIS — R20.2 BILATERAL LEG PARESTHESIA: ICD-10-CM

## 2023-01-12 PROCEDURE — 1159F MED LIST DOCD IN RCRD: CPT | Mod: CPTII,S$GLB,, | Performed by: INTERNAL MEDICINE

## 2023-01-12 PROCEDURE — 99205 OFFICE O/P NEW HI 60 MIN: CPT | Mod: S$GLB,,, | Performed by: INTERNAL MEDICINE

## 2023-01-12 PROCEDURE — 3080F DIAST BP >= 90 MM HG: CPT | Mod: CPTII,S$GLB,, | Performed by: INTERNAL MEDICINE

## 2023-01-12 PROCEDURE — 99999 PR PBB SHADOW E&M-EST. PATIENT-LVL IV: ICD-10-PCS | Mod: PBBFAC,,, | Performed by: INTERNAL MEDICINE

## 2023-01-12 PROCEDURE — 3008F BODY MASS INDEX DOCD: CPT | Mod: CPTII,S$GLB,, | Performed by: INTERNAL MEDICINE

## 2023-01-12 PROCEDURE — 1160F RVW MEDS BY RX/DR IN RCRD: CPT | Mod: CPTII,S$GLB,, | Performed by: INTERNAL MEDICINE

## 2023-01-12 PROCEDURE — 3080F PR MOST RECENT DIASTOLIC BLOOD PRESSURE >= 90 MM HG: ICD-10-PCS | Mod: CPTII,S$GLB,, | Performed by: INTERNAL MEDICINE

## 2023-01-12 PROCEDURE — 3008F PR BODY MASS INDEX (BMI) DOCUMENTED: ICD-10-PCS | Mod: CPTII,S$GLB,, | Performed by: INTERNAL MEDICINE

## 2023-01-12 PROCEDURE — 99205 PR OFFICE/OUTPT VISIT, NEW, LEVL V, 60-74 MIN: ICD-10-PCS | Mod: S$GLB,,, | Performed by: INTERNAL MEDICINE

## 2023-01-12 PROCEDURE — 1160F PR REVIEW ALL MEDS BY PRESCRIBER/CLIN PHARMACIST DOCUMENTED: ICD-10-PCS | Mod: CPTII,S$GLB,, | Performed by: INTERNAL MEDICINE

## 2023-01-12 PROCEDURE — 3077F PR MOST RECENT SYSTOLIC BLOOD PRESSURE >= 140 MM HG: ICD-10-PCS | Mod: CPTII,S$GLB,, | Performed by: INTERNAL MEDICINE

## 2023-01-12 PROCEDURE — 99999 PR PBB SHADOW E&M-EST. PATIENT-LVL IV: CPT | Mod: PBBFAC,,, | Performed by: INTERNAL MEDICINE

## 2023-01-12 PROCEDURE — 1159F PR MEDICATION LIST DOCUMENTED IN MEDICAL RECORD: ICD-10-PCS | Mod: CPTII,S$GLB,, | Performed by: INTERNAL MEDICINE

## 2023-01-12 PROCEDURE — 3077F SYST BP >= 140 MM HG: CPT | Mod: CPTII,S$GLB,, | Performed by: INTERNAL MEDICINE

## 2023-01-12 RX ORDER — PREGABALIN 50 MG/1
50 CAPSULE ORAL 3 TIMES DAILY
Qty: 90 CAPSULE | Refills: 6 | Status: CANCELLED | OUTPATIENT
Start: 2023-01-12 | End: 2023-07-13

## 2023-01-12 NOTE — PROGRESS NOTES
Chief Complaint   Patient presents with    Disease Management       Patient was referred by     History of presenting illness      52 year old white female with    Fibromyalgia with flares : diagnosis given by   Every single part of her body hurts  Feet,legs,shoulders,spine hurts  On gabapentin 300 mg bid- at one point this dose was working not anymore    CCP neg  SSA,SSB neg  ESR,CRP nml  RF neg  DARRELL neg      Feet neuropathy - tingling and numbness and swelling, it burns to walk   Lumbar radiculopathy     MRI LS Spine 2021  Multiplanar noncontrast imaging was performed. Comparison is  made to May 2019.     There is no evidence of lumbar fracture, subluxation, or osseous  destructive lesion. Marrow signal is mildly heterogeneous. Small  vertebral body hemangiomas are noted at L1 and L3, similar to the  prior study. Signal within the conus medullaris is within normal  limits. Multilevel disc desiccation is noted.     T12-L1: No significant abnormalities.     L1-2: Minimal broad-based disc bulging without central canal or  foraminal stenosis, unchanged.     L2-3: Mild broad-based disc bulge results in mild narrowing of the  central canal, without significant foraminal stenosis, unchanged.     L3-4: Mild broad-based disc bulge results in no significant central  canal stenosis. There is mild bilateral foraminal narrowing,  unchanged.     L4-5: There is moderate loss of intervertebral disc height. Mild  broad-based disc/osteophyte complex results in mild central canal  stenosis, unchanged. Mild facet and ligamentum flavum hypertrophy are  also noted. There is mild bilateral left-sided foraminal narrowing  without direct nerve root impingement.     L5-S1: Mild broad-based disc protrusion and small outer annular tear  are noted. There is resultant minor narrowing of the central canal. In  combination with mild facet hypertrophy, there is mild bilateral  foraminal narrowing, without direct nerve root  impingement.     IMPRESSION:  1. Mild multilevel lumbar degenerative disc and facet disease as  detailed at each individual level above. There is no evidence of  high-grade spinal stenosis or direct nerve root impingement. Findings  are similar in appearance to May 8, 2019.    Sacoriliac joint dysfunction needing injections  Never had EMG/NCS or seen a neurologist    Neck surgery-in 2007, ruptured disc sitting on the spine  S/p surgery her neck does well  She has some trigger points in the upper back muscles  In the past she had pain shooting down the arms but surgery took care of this    Shoulder surgery- glenoid tear in the right shoulder due to pushing a heavy patient- she has recovered well     Right knee osteoarthritis needing Knee replacement  Right knee meniscal tear in the past needing surgery  Left knee osteoarthritis- not as severe    CMC OA-left hand needing steroid injections    Bladder suspension    Varicose veins needing saphenous ablations    Raynaud's  Livedo     Depression    Irritable bowel syndrome  Esophageal spasms    She can fall asleep but cannot stay asleep  She takes melatonin and benadryl  She has no snoring  She doesn't wake up fresh in the mornings  Drowsy during the day    She swims,walks and 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    She cannot tolerate SSRIs, cymbalta not an option    Family history : asthma,depression,htn- mom  Heart disease,depression,htn  Mom has OA    Social history : former smoker, quit in 2007        Review of Systems   Constitutional:  Negative for fever and unexpected weight change.   HENT:  Negative for mouth sores and trouble swallowing.    Eyes:  Negative for redness.   Respiratory:  Negative for cough and shortness of breath.    Cardiovascular:  Negative for chest pain.   Gastrointestinal:  Positive for constipation and diarrhea.   Genitourinary:  Negative for dysuria and genital sores.   Skin:  Negative for rash.   Neurological:  Positive for headaches.    Hematological:  Bruises/bleeds easily.       Areas of white rash on the chest and ears- eating processed foods can do this to her  No skin rashes,malar rash,photosensitivity   No telangiectasias   No calcinosis   No psoriasis   No patchy alopecia   No oral and nasal ulcers   No dry eyes and dry mouth   No pleurisy or any cardiopulmonary complaints   No dysphagia,diplopia and dysphonia and muscle weakness   No n/v/d/c   No acid reflux+   Has raynaud's+   No digital ulcers   No cytopenias   No renal issues   No blood clots   No fever,chills,night sweats,weight loss and loss of appetite   1 miscarriage at 12th week pregnancy and then she has had healthy pregnancies  No new onset headaches   No recurrent conjunctivitis or uveitis or scleritis or episcleritis   No chronic or bloody diarrhea with no u colitis or crohn's /inflammatory bowel disease - hemorrhoids and that can bleed  No vaginal or  urethral  d/c/STDs/no ulcers   No unexplained lymphadenopathy,parotitis   No seizures,strokes,psychosis  No sclerodactyly  No puffy hands  No perioral tightness           Physical Exam   Constitutional: She is oriented to person, place, and time. No distress.   HENT:   Head: Normocephalic.   Mouth/Throat: Oropharynx is clear and moist.   Eyes: Pupils are equal, round, and reactive to light. Conjunctivae are normal. Right eye exhibits no discharge. Left eye exhibits no discharge. No scleral icterus.   Neck: No thyromegaly present.   Cardiovascular: Normal rate, regular rhythm and normal heart sounds.   Pulmonary/Chest: Effort normal and breath sounds normal. No stridor.   Abdominal: Soft. Bowel sounds are normal.   Musculoskeletal:         General: Normal range of motion.      Cervical back: Normal range of motion.   Lymphadenopathy:     She has no cervical adenopathy.   Neurological: She is alert and oriented to person, place, and time.   Skin: Skin is warm. No rash noted. She is not diaphoretic.   Psychiatric: Affect and  judgment normal.     Widespread pain index  Note the areas which the patient has had pain over the last week:                   Shoulder-girdle, left               Shoulder-girdle, right                         Upper arm left                       Upper arm right                         Lower arm left                       Lower arm right    Hip (buttock, trochanter) left  Hip (buttock, trochanter) right                           Upper leg, left                         Upper leg, right                           Lower leg, left                         Lower leg, right                                     Jaw, left                                   Jaw, right                                        Chest                                  Abdomen                               Upper back                              Lower back                                        Neck  Score will be from 0-19: 15/19                                         Symptom severity score  Fatigue 2.5  Waking Unrefreshed  3  Cognitive Symptoms  3   0 = no problem, 1=slight or mild problem 2= moderate; considerable problems often present and/or at a moderate level, 3 = severe, pervasive, continuous, life disturbing problem  For each of the 3 symptoms, indicate the level of severity over the past week using the Scale.  The symptom severity score is the sum of the severity of the 3 symptoms (fatigue, waking unrefreshed, and cognitive symptoms) plus the number of the following symptoms occurring during the previous 6 months:   Headaches 1  Pain or cramps in the lower abdomen 1  Depression 1  The final score is between 0 and 12 :11.5/12                                          Criteria  Patient has fibromyalgia if the following 3 conditions are met:  1.  Widespread pain index greater than or equal to 7 and symptom severity score greater than or equal to 5 or widespread pain index between 3- 6, and symptom severity score greater than or equal to 9.    2.   Symptoms have been present in a similar level for at least 3 months  3.  The patient does not have a disorder that would otherwise sufficiently explain the pain      Assessment     52 year old female diagnosed to have    Fibromyalgia in [id number] by a rheumatologist- when she had presented with severe pain in every single part of the body not just limited to the joints    Of note she has     Lumbar spine degenerative arthritis  Sacroiliac joint dysfunction needing injections  Herniated disc in the neck needing surgery with significant resolution of radicular symptoms in the arms  Right knee osteoarthritis following right knee meniscal repair needing right knee replacement  Left knee osteoarthritis  Right shoulder glenoid tear needing surgery,with resolution of symptoms  b/l TMJ  Left CMC osteoarthritis    She also has symptoms of leg paresthesias - etiology of which is not known    Rheumatologic work up has been unremarkable    She has symptoms of fatigue, sleep disturbances, memory issues and brain fog with cognitive disturbances, depression, irritable bowel syndrome       Other significant symptoms and events-  Bladder suspension,due to prolapse-by having big babies  Varicose veins needing saphenous ablations  Raynaud's  Livedo     Today-    Widespread pain index 15/19  Symptom severity score 11.5/12    Gabapentin 300 mg bid worked only for a brief period  She cannot tolerate cymbalta,elavil    1. Sleep disorder    2. Lumbar radiculopathy    3. Irritable bowel syndrome with constipation    4. Fibromyalgia    5. Bilateral leg paresthesia        Reviewed labs/xrays  Reviewed medications    Ordered labs /xrays    I have independently reviewed her imaging studies       New problem     Plan      We will d/c gabapentin  She already feels drowsy   She also worries that it is affecting her memory    We will try lyrica 50 mg tid    Water aerobics,oni chi,yoga    Therapeutic massages-helps her-suggested she can  continue    Moderate intensity water aerobics vs land based aerobics    Sleep clinic evaluation    IBS management    APLAS labs- she will bring it next time    EMG/NCS B/L LE      Raynaud's management-  Avoid cold temperatures  Avoid rapid shifts of temperatures  Avoid cold/damp breezes  Wear layers of loose fitting clothes over the torso,preferably wear clothes made of cotton/wool  Wear a hat and cover face/ears with a scarf  Wear good shoes and boots that fit well and dont injure the skin  Wear heavy protective socks  Wear gloves/mittens which are warm  Always set thermostat at a good temperature  Always keep a sweater or jacket  Use flannel sheets,warm blankets,electric blankets,preheated beds  Avoid touching cold water  Wear gloves to reach into freezers  Use insulated containers,gloves or napkins to hold cold drinks and food  Rinse vegetables with warm water,protect hands while washing dishes  Use disposable chemical heat packs for extra heat  Protect the skin using lotion containing lanolin on hands and feet  Wash with mild creamy soap  Examine for finger and toe tip ulcers  Nail care very important   Avoid stress  Dont smoke  If you have an episode :  Swing them around as if you are going to throw a soft ball  Warm your fingers by placing them under armpits  Wiggle your fingers and toes  Move and walk around   Run warm water until normal skin color returns      More than 60 minutes spent taking care of the patient     Saima was seen today for disease management.    Diagnoses and all orders for this visit:    Sleep disorder  -     Ambulatory referral/consult to Sleep Disorders; Future    Lumbar radiculopathy    Irritable bowel syndrome with constipation    Fibromyalgia    Bilateral leg paresthesia  -     EMG W/ ULTRASOUND AND NERVE CONDUCTION TEST 2 Extremities; Future    Other orders  The following orders have not been finalized:  -     Cancel: pregabalin (LYRICA) 50 MG capsule

## 2023-01-12 NOTE — PROGRESS NOTES
Rapid3 Question Responses and Scores 1/12/2023   MDHAQ Score 0.3   Psychologic Score 4.4   Pain Score 7   When you awakened in the morning OVER THE LAST WEEK, did you feel stiff? Yes   If Yes, please indicate the number of hours until you are as limber as you will be for the day 1   Fatigue Score 6   Global Health Score 6   RAPID3 Score 4.67         Answers submitted by the patient for this visit:  Rheumatology Questionnaire (Submitted on 1/12/2023)  fever: No  eye redness: No  mouth sores: No  headaches: Yes  shortness of breath: No  chest pain: No  trouble swallowing: No  diarrhea: Yes  constipation: Yes  unexpected weight change: No  genital sore: No  dysuria: No  During the last 3 days, have you had a skin rash?: No  Bruises or bleeds easily: Yes  cough: No

## 2023-01-13 ENCOUNTER — PATIENT MESSAGE (OUTPATIENT)
Dept: RHEUMATOLOGY | Facility: CLINIC | Age: 53
End: 2023-01-13
Payer: COMMERCIAL

## 2023-01-13 RX ORDER — PREGABALIN 50 MG/1
50 CAPSULE ORAL 3 TIMES DAILY
Qty: 90 CAPSULE | Refills: 6 | Status: SHIPPED | OUTPATIENT
Start: 2023-01-13 | End: 2023-02-10

## 2023-01-16 PROBLEM — M25.561 ACUTE POSTOPERATIVE PAIN OF RIGHT KNEE: Status: RESOLVED | Noted: 2022-10-11 | Resolved: 2023-01-16

## 2023-01-16 PROBLEM — G89.18 ACUTE POSTOPERATIVE PAIN OF RIGHT KNEE: Status: RESOLVED | Noted: 2022-10-11 | Resolved: 2023-01-16

## 2023-01-25 DIAGNOSIS — Z96.651 STATUS POST TOTAL KNEE REPLACEMENT, RIGHT: ICD-10-CM

## 2023-01-25 DIAGNOSIS — M17.11 PRIMARY OSTEOARTHRITIS OF RIGHT KNEE: ICD-10-CM

## 2023-01-25 RX ORDER — GABAPENTIN 300 MG/1
CAPSULE ORAL
Qty: 60 CAPSULE | Refills: 2 | OUTPATIENT
Start: 2023-01-25

## 2023-01-26 ENCOUNTER — LAB VISIT (OUTPATIENT)
Dept: LAB | Facility: HOSPITAL | Age: 53
End: 2023-01-26
Attending: INTERNAL MEDICINE
Payer: COMMERCIAL

## 2023-01-26 DIAGNOSIS — Z00.00 ANNUAL PHYSICAL EXAM: ICD-10-CM

## 2023-01-26 LAB
BASOPHILS # BLD AUTO: 0.07 K/UL (ref 0–0.2)
BASOPHILS NFR BLD: 1.1 % (ref 0–1.9)
CHOLEST SERPL-MCNC: 195 MG/DL (ref 120–199)
CHOLEST/HDLC SERPL: 1.6 {RATIO} (ref 2–5)
DIFFERENTIAL METHOD: ABNORMAL
EOSINOPHIL # BLD AUTO: 0.2 K/UL (ref 0–0.5)
EOSINOPHIL NFR BLD: 3.3 % (ref 0–8)
ERYTHROCYTE [DISTWIDTH] IN BLOOD BY AUTOMATED COUNT: 12.6 % (ref 11.5–14.5)
ESTIMATED AVG GLUCOSE: 108 MG/DL (ref 68–131)
HBA1C MFR BLD: 5.4 % (ref 4.5–6.2)
HCT VFR BLD AUTO: 37.6 % (ref 37–48.5)
HDLC SERPL-MCNC: 123 MG/DL (ref 40–75)
HDLC SERPL: 63.1 % (ref 20–50)
HGB BLD-MCNC: 12.7 G/DL (ref 12–16)
IMM GRANULOCYTES # BLD AUTO: 0.02 K/UL (ref 0–0.04)
IMM GRANULOCYTES NFR BLD AUTO: 0.3 % (ref 0–0.5)
LDLC SERPL CALC-MCNC: 62.4 MG/DL (ref 63–159)
LYMPHOCYTES # BLD AUTO: 3.1 K/UL (ref 1–4.8)
LYMPHOCYTES NFR BLD: 49.3 % (ref 18–48)
MCH RBC QN AUTO: 30.7 PG (ref 27–31)
MCHC RBC AUTO-ENTMCNC: 33.8 G/DL (ref 32–36)
MCV RBC AUTO: 91 FL (ref 82–98)
MONOCYTES # BLD AUTO: 0.7 K/UL (ref 0.3–1)
MONOCYTES NFR BLD: 10.7 % (ref 4–15)
NEUTROPHILS # BLD AUTO: 2.3 K/UL (ref 1.8–7.7)
NEUTROPHILS NFR BLD: 35.3 % (ref 38–73)
NONHDLC SERPL-MCNC: 72 MG/DL
NRBC BLD-RTO: 0 /100 WBC
PLATELET # BLD AUTO: 273 K/UL (ref 150–450)
PMV BLD AUTO: 9.3 FL (ref 9.2–12.9)
RBC # BLD AUTO: 4.14 M/UL (ref 4–5.4)
TRIGL SERPL-MCNC: 48 MG/DL (ref 30–150)
TSH SERPL DL<=0.005 MIU/L-ACNC: 1.03 UIU/ML (ref 0.34–5.6)
WBC # BLD AUTO: 6.37 K/UL (ref 3.9–12.7)

## 2023-01-26 PROCEDURE — 83036 HEMOGLOBIN GLYCOSYLATED A1C: CPT | Performed by: INTERNAL MEDICINE

## 2023-01-26 PROCEDURE — 84443 ASSAY THYROID STIM HORMONE: CPT | Performed by: INTERNAL MEDICINE

## 2023-01-26 PROCEDURE — 36415 COLL VENOUS BLD VENIPUNCTURE: CPT | Performed by: INTERNAL MEDICINE

## 2023-01-26 PROCEDURE — 80061 LIPID PANEL: CPT | Performed by: INTERNAL MEDICINE

## 2023-01-26 PROCEDURE — 85025 COMPLETE CBC W/AUTO DIFF WBC: CPT | Performed by: INTERNAL MEDICINE

## 2023-01-30 ENCOUNTER — OFFICE VISIT (OUTPATIENT)
Dept: FAMILY MEDICINE | Facility: CLINIC | Age: 53
End: 2023-01-30
Payer: COMMERCIAL

## 2023-01-30 ENCOUNTER — PATIENT MESSAGE (OUTPATIENT)
Dept: FAMILY MEDICINE | Facility: CLINIC | Age: 53
End: 2023-01-30

## 2023-01-30 ENCOUNTER — PATIENT MESSAGE (OUTPATIENT)
Dept: RHEUMATOLOGY | Facility: CLINIC | Age: 53
End: 2023-01-30
Payer: COMMERCIAL

## 2023-01-30 VITALS
SYSTOLIC BLOOD PRESSURE: 136 MMHG | BODY MASS INDEX: 25.43 KG/M2 | HEIGHT: 67 IN | WEIGHT: 162 LBS | DIASTOLIC BLOOD PRESSURE: 96 MMHG | HEART RATE: 87 BPM

## 2023-01-30 DIAGNOSIS — D72.820 LYMPHOCYTOSIS: ICD-10-CM

## 2023-01-30 DIAGNOSIS — Z96.651 STATUS POST TOTAL KNEE REPLACEMENT, RIGHT: ICD-10-CM

## 2023-01-30 DIAGNOSIS — Z23 NEED FOR INFLUENZA VACCINATION: ICD-10-CM

## 2023-01-30 DIAGNOSIS — M62.838 MUSCLE SPASM: ICD-10-CM

## 2023-01-30 DIAGNOSIS — M19.90 ARTHRITIS: Primary | ICD-10-CM

## 2023-01-30 DIAGNOSIS — M79.7 FIBROMYALGIA: ICD-10-CM

## 2023-01-30 DIAGNOSIS — E55.9 VITAMIN D DEFICIENCY: ICD-10-CM

## 2023-01-30 DIAGNOSIS — Z00.00 ANNUAL PHYSICAL EXAM: Primary | ICD-10-CM

## 2023-01-30 PROCEDURE — 1159F MED LIST DOCD IN RCRD: CPT | Mod: CPTII,S$GLB,, | Performed by: INTERNAL MEDICINE

## 2023-01-30 PROCEDURE — 90686 IIV4 VACC NO PRSV 0.5 ML IM: CPT | Mod: S$GLB,,, | Performed by: INTERNAL MEDICINE

## 2023-01-30 PROCEDURE — 3044F PR MOST RECENT HEMOGLOBIN A1C LEVEL <7.0%: ICD-10-PCS | Mod: CPTII,S$GLB,, | Performed by: INTERNAL MEDICINE

## 2023-01-30 PROCEDURE — 3075F PR MOST RECENT SYSTOLIC BLOOD PRESS GE 130-139MM HG: ICD-10-PCS | Mod: CPTII,S$GLB,, | Performed by: INTERNAL MEDICINE

## 2023-01-30 PROCEDURE — 90471 FLU VACCINE (QUAD) GREATER THAN OR EQUAL TO 3YO PRESERVATIVE FREE IM: ICD-10-PCS | Mod: S$GLB,,, | Performed by: INTERNAL MEDICINE

## 2023-01-30 PROCEDURE — 99396 PR PREVENTIVE VISIT,EST,40-64: ICD-10-PCS | Mod: 25,S$GLB,, | Performed by: INTERNAL MEDICINE

## 2023-01-30 PROCEDURE — 3008F PR BODY MASS INDEX (BMI) DOCUMENTED: ICD-10-PCS | Mod: CPTII,S$GLB,, | Performed by: INTERNAL MEDICINE

## 2023-01-30 PROCEDURE — 99396 PREV VISIT EST AGE 40-64: CPT | Mod: 25,S$GLB,, | Performed by: INTERNAL MEDICINE

## 2023-01-30 PROCEDURE — 1160F RVW MEDS BY RX/DR IN RCRD: CPT | Mod: CPTII,S$GLB,, | Performed by: INTERNAL MEDICINE

## 2023-01-30 PROCEDURE — 1159F PR MEDICATION LIST DOCUMENTED IN MEDICAL RECORD: ICD-10-PCS | Mod: CPTII,S$GLB,, | Performed by: INTERNAL MEDICINE

## 2023-01-30 PROCEDURE — 3075F SYST BP GE 130 - 139MM HG: CPT | Mod: CPTII,S$GLB,, | Performed by: INTERNAL MEDICINE

## 2023-01-30 PROCEDURE — 3080F PR MOST RECENT DIASTOLIC BLOOD PRESSURE >= 90 MM HG: ICD-10-PCS | Mod: CPTII,S$GLB,, | Performed by: INTERNAL MEDICINE

## 2023-01-30 PROCEDURE — 90686 FLU VACCINE (QUAD) GREATER THAN OR EQUAL TO 3YO PRESERVATIVE FREE IM: ICD-10-PCS | Mod: S$GLB,,, | Performed by: INTERNAL MEDICINE

## 2023-01-30 PROCEDURE — 1160F PR REVIEW ALL MEDS BY PRESCRIBER/CLIN PHARMACIST DOCUMENTED: ICD-10-PCS | Mod: CPTII,S$GLB,, | Performed by: INTERNAL MEDICINE

## 2023-01-30 PROCEDURE — 3080F DIAST BP >= 90 MM HG: CPT | Mod: CPTII,S$GLB,, | Performed by: INTERNAL MEDICINE

## 2023-01-30 PROCEDURE — 3008F BODY MASS INDEX DOCD: CPT | Mod: CPTII,S$GLB,, | Performed by: INTERNAL MEDICINE

## 2023-01-30 PROCEDURE — 3044F HG A1C LEVEL LT 7.0%: CPT | Mod: CPTII,S$GLB,, | Performed by: INTERNAL MEDICINE

## 2023-01-30 PROCEDURE — 90471 IMMUNIZATION ADMIN: CPT | Mod: S$GLB,,, | Performed by: INTERNAL MEDICINE

## 2023-01-30 RX ORDER — BACLOFEN 10 MG/1
10 TABLET ORAL NIGHTLY
Qty: 30 TABLET | Refills: 6 | Status: SHIPPED | OUTPATIENT
Start: 2023-01-30 | End: 2023-08-15 | Stop reason: SDUPTHER

## 2023-01-30 RX ORDER — MELOXICAM 15 MG/1
15 TABLET ORAL DAILY
Qty: 30 TABLET | Refills: 5 | Status: SHIPPED | OUTPATIENT
Start: 2023-01-30 | End: 2023-08-15 | Stop reason: SDUPTHER

## 2023-01-30 NOTE — PROGRESS NOTES
Subjective:       Patient ID: Saima Winkler is a 52 y.o. female.    Chief Complaint: Annual Exam and Follow-up    Miss Saima Winkler ATILIO. is a  52-year-old  female who comes for annual physical.        Her underlying medical history is as below:-    1.-hot flashes and being currently on HRT.  She has a staggered and delayed appointment with gynecologist and would like to have 90 days of prescription for estradiol.  She does plan to wean off this medication slowly and steadily over a period of time.  This helps her with hot flashes.    2.-chronic constipation and previously she was prescribed Linzess which did seem to help her.  Unfortunately it seem to cause him more diarrhea than benefit.  She has stopped taking it and she is currently stable on over-the-counter supplements and sometimes magnesium supplements which keeps her acceptably regular.    3.-complains of chronic knee pain on the right side especially around the kneecap.  She had a meniscus surgery in past.  She has been told that she has arthritis and cartilage problems and may need some surgical intervention in future.  Currently on Lyrica also.  The knee surgery postoperative was somewhat rough with a prolonged recovery period.  Kindly she seems to be getting around.  4.-recently for some reason she checked her cortisol levels from home kit and she found that the levels were low.  Perhaps some fatigue but nothing great.  No low blood pressures.  No fainting spells.    5.-sinus allergies and problems for which she takes cetirizine over-the-counter.  6.-long-term use of estrogen.  7.-diagnosis of fibromyalgia.  Dr. Brad Hung has recently relocated and retired.  She has established with Dr. Rusty Chandler.  She has been prescribed Lyrica and that has been apparently a game changer.  Her pain is much better at this point.    Social history indicates that she is  .  This is her 2nd marriage.    No recent foreign  "travel.    She has had at least 4 doses of COVID vaccine.  I have encouraged her to continue with COVID precautions and consider regular immunizations.    She works as a registered nurse at [employer].  She is a former smoker having quit in .  Social alcohol use.  She has 3 children. 33, 28 22    Preventive care issues discussed include shingles vaccine, booster dosage of COVID vaccine and influenza vaccination.  She did get 1 vaccine in past at [facility] and she is pending getting the booster dosage.  If she does get it, she should forward us the confirmation of same.  She did get the flu shot in our office today.    Social determinants of health care have been reviewed.    Family history pertinent- father is  at the age of 65 and he had COPD and CHF.  Mother is spunky and alive at 82 and still working.          Past Medical History:   Diagnosis Date    Anxiety     Depression      Social History     Socioeconomic History    Marital status:      Spouse name: Stacy Garcia (Ken)    Number of children: 3   Occupational History    Occupation: Registered Nurse      Employer: [employer]     Comment: [employer]   Tobacco Use    Smoking status: Former     Types: Cigarettes     Quit date: 2007     Years since quittin.0    Smokeless tobacco: Never   Substance and Sexual Activity    Alcohol use: Yes    Drug use: No    Sexual activity: Yes     Partners: Male   Social History Narrative    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  She is a registered nurse.  She has 3 children age 33, 28and 22     Social Determinants of Health     Financial Resource Strain: Low Risk     Difficulty of Paying Living Expenses: Not very hard   Food Insecurity: No Food Insecurity    Worried About Running Out of Food in the Last Year: Never true    Ran Out of Food in the Last Year: Never true   Transportation Needs: No Transportation Needs    Lack of Transportation (Medical): No    " Lack of Transportation (Non-Medical): No   Physical Activity: Insufficiently Active    Days of Exercise per Week: 5 days    Minutes of Exercise per Session: 20 min   Stress: Stress Concern Present    Feeling of Stress : To some extent   Social Connections: Socially Integrated    Frequency of Communication with Friends and Family: More than three times a week    Frequency of Social Gatherings with Friends and Family: Twice a week    Attends [employer] Services: More than 4 times per year    Active Member of Clubs or Organizations: Yes    Attends Club or Organization Meetings: 1 to 4 times per year    Marital Status:    Housing Stability: Low Risk     Unable to Pay for Housing in the Last Year: No    Number of Places Lived in the Last Year: 1    Unstable Housing in the Last Year: No     Past Surgical History:   Procedure Laterality Date    HYSTERECTOMY      KNEE SURGERY      ROBOTIC ARTHROPLASTY, KNEE Right 9/19/2022    Procedure: ROBOTIC ARTHROPLASTY, KNEE, TOTAL;  Surgeon: Eduar Whittington MD;  Location: [facility];  Service: Orthopedics;  Laterality: Right;    SHOULDER SURGERY      TRANSFORAMINAL EPIDURAL INJECTION OF STEROID Bilateral 6/4/2019    Procedure: Injection,steroid,epidural,transforaminal approach L4-5;  Surgeon: Jeyson Kent MD;  [facility];  Service: Pain Management;  Laterality: Bilateral;    TRANSFORAMINAL EPIDURAL INJECTION OF STEROID Bilateral 7/25/2019    Procedure: Injection,steroid,epidural,transforaminal approach;  Surgeon: Jeyson Kent MD;  Location: [facility];  Service: Pain Management;  Laterality: Bilateral;  L4-5    vaginal sling       Family History   Problem Relation Age of Onset    Asthma Mother     Depression Mother     Hypertension Mother     Heart disease Father     Depression Father     Hypertension Father        Review of Systems   Constitutional:  Positive for activity change (Because of knee pain.) and fatigue (Mild fatigue if at all.). Negative for chills, diaphoresis, fever  and unexpected weight change.        2 X Covid infections in past    HENT:  Negative for congestion, hearing loss, rhinorrhea and trouble swallowing.    Eyes:  Negative for discharge, redness and visual disturbance.   Respiratory:  Negative for apnea, cough, chest tightness, wheezing and stridor.    Cardiovascular:  Negative for chest pain, palpitations and leg swelling.   Gastrointestinal:  Positive for constipation (Better with magnesium supplements and stool softeners). Negative for abdominal distention, abdominal pain, blood in stool, diarrhea and vomiting.        She takes magnesium supplements which do help.  Linzess apparently caused her dehydration at the dosage she was taking.  She stopped using it.   Endocrine: Negative for cold intolerance, polydipsia and polyuria.        Patient does get hot flashes and menopausal symptoms.  Currently on estradiol 1 mg and would consider weaning it off in future.  Mild fatigue.  She had checked home kit for cortisol level which was apparently low.   Genitourinary:  Negative for difficulty urinating, dyspareunia, dysuria, hematuria and menstrual problem.   Musculoskeletal:  Positive for arthralgias and joint swelling. Negative for neck pain.        Chronic right knee pain and she had a total knee replacement.  After 4 months of postop discomfort she is finally seeing some recovery and relief.    She also has symptoms of fibromyalgia and is being treated with Lyrica and Celebrex which seems to help her.  She is following up with Dr. Rusty Mckenzie   Skin:  Positive for color change. Negative for rash and wound.        Patient has pointed out pictures in which her fingers look blanched and discolored suggestive of Raynaud's phenomenon.  Pictures of leg show some libido type of pattern.  Currently in office patient is doing okay.  The finger pictures were taken not December 23rd which was a ice freezing day.  She does state that her fingers get discolored whenever she  "puts them in the freezer also.   Neurological:  Positive for headaches. Negative for seizures, syncope, weakness and numbness.        Intermittent headaches.   Psychiatric/Behavioral:  Negative for agitation, confusion and dysphoric mood. The patient is not nervous/anxious.         Stress and anxiety is mostly a function of her pain and discomfort which seems to put her in a spiral down.  Hopefully future will be better.       Livido reticularis thigh    Raynauds fingers    Raynauds fingers  Objective:      Blood pressure (!) 136/96, pulse 87, height 5' 7" (1.702 m), weight 73.5 kg (162 lb). Body mass index is 25.37 kg/m².  Physical Exam  Exam conducted with a chaperone present (Cinthia CHASE).   Constitutional:       General: She is not in acute distress.     Appearance: Normal appearance. She is not ill-appearing, toxic-appearing or diaphoretic.      Comments: BMI is 25.37   HENT:      Right Ear: Hearing, tympanic membrane and ear canal normal.      Left Ear: Hearing, tympanic membrane and ear canal normal.      Ears:      Comments: Patient's hearing is grossly intact to ordinary conversation and finger snapping in the years.  Eyes:      General: No scleral icterus.     Comments: Patient's vision is grossly intact to 1 in letters and color vision.   Cardiovascular:      Rate and Rhythm: Normal rate and regular rhythm.      Pulses: Normal pulses.   Pulmonary:      Effort: No respiratory distress.      Breath sounds: No stridor.   Abdominal:      General: There is no distension.      Palpations: There is no mass.      Tenderness: There is no abdominal tenderness.   Musculoskeletal:         General: No swelling.      Right lower leg: No edema.      Left lower leg: No edema.        Legs:       Comments: Changes of osteoarthritis in the knees.  No swelling in the calf muscles.  No tenderness in the calf muscles.  João sign is negative.   Skin:     Coloration: Skin is not jaundiced or pale.      Findings: No lesion.      " Comments: Mild skin discoloration changes.  Additional-she had gone to a tanning salon and the tan seems to be falling off at this point.   Neurological:      Mental Status: She is alert. Mental status is at baseline.      Cranial Nerves: No cranial nerve deficit.      Sensory: No sensory deficit.   Psychiatric:         Behavior: Behavior normal.      Comments: Perhaps slightly anhedonic and anxious.         Assessment:       1. Annual physical exam    2. Need for influenza vaccination           Lab Visit on 01/26/2023   Component Date Value Ref Range Status    TSH 01/26/2023 1.030  0.340 - 5.600 uIU/mL Final    WBC 01/26/2023 6.37  3.90 - 12.70 K/uL Final    RBC 01/26/2023 4.14  4.00 - 5.40 M/uL Final    Hemoglobin 01/26/2023 12.7  12.0 - 16.0 g/dL Final    Hematocrit 01/26/2023 37.6  37.0 - 48.5 % Final    MCV 01/26/2023 91  82 - 98 fL Final    MCH 01/26/2023 30.7  27.0 - 31.0 pg Final    MCHC 01/26/2023 33.8  32.0 - 36.0 g/dL Final    RDW 01/26/2023 12.6  11.5 - 14.5 % Final    Platelets 01/26/2023 273  150 - 450 K/uL Final    MPV 01/26/2023 9.3  9.2 - 12.9 fL Final    Immature Granulocytes 01/26/2023 0.3  0.0 - 0.5 % Final    Gran # (ANC) 01/26/2023 2.3  1.8 - 7.7 K/uL Final    Immature Grans (Abs) 01/26/2023 0.02  0.00 - 0.04 K/uL Final    Lymph # 01/26/2023 3.1  1.0 - 4.8 K/uL Final    Mono # 01/26/2023 0.7  0.3 - 1.0 K/uL Final    Eos # 01/26/2023 0.2  0.0 - 0.5 K/uL Final    Baso # 01/26/2023 0.07  0.00 - 0.20 K/uL Final    nRBC 01/26/2023 0  0 /100 WBC Final    Gran % 01/26/2023 35.3 (L)  38.0 - 73.0 % Final    Lymph % 01/26/2023 49.3 (H)  18.0 - 48.0 % Final    Mono % 01/26/2023 10.7  4.0 - 15.0 % Final    Eosinophil % 01/26/2023 3.3  0.0 - 8.0 % Final    Basophil % 01/26/2023 1.1  0.0 - 1.9 % Final    Differential Method 01/26/2023 Automated   Final    Hemoglobin A1C 01/26/2023 5.4  4.5 - 6.2 % Final    Estimated Avg Glucose 01/26/2023 108  68 - 131 mg/dL Final    Cholesterol 01/26/2023 195  120 - 199  mg/dL Final    Triglycerides 01/26/2023 48  30 - 150 mg/dL Final    HDL 01/26/2023 123 (H)  40 - 75 mg/dL Final    LDL Cholesterol 01/26/2023 62.4 (L)  63.0 - 159.0 mg/dL Final    HDL/Cholesterol Ratio 01/26/2023 63.1 (H)  20.0 - 50.0 % Final    Total Cholesterol/HDL Ratio 01/26/2023 1.6 (L)  2.0 - 5.0 Final    Non-HDL Cholesterol 01/26/2023 72  mg/dL Final         Plan:           Annual physical exam  Comments:  Physical examination is unremarkable except for medical issues mentioned in the body of the report.  Lipid panel, thyroid, blood counts and A1c levels good    Need for influenza vaccination  -     Influenza - Quadrivalent (PF)      Ms. Landaverde's medical examination for annual physical has been done.  Except for the medical issues mentioned in past including perhaps an undifferentiated connective tissue disorder manifesting with Raynaud's and libido and multiple issues of arthritis, no other major issue identified at this point.      Her lipid panel, A1c levels, blood counts are in acceptable range.  The blood counts show slight reversal of neutrophil and lymphocyte ratio.  This could be Hallmark of some autoimmune condition and will keep a close eye on that.      Constipation is fairly stable currently with magnesium or milk of magnesium supplements.  Linzess did not do too good on her and in fact made her go to the bathroom frequently.      Lyrica seems to have made a big difference in her overall tolerance of pain.      She has established with a new rheumatologist Dr. Gallagher in [place].      She should get a regular dental and eye examination and also update on gynecological checkup mammograms as needed.      She did get 3 vaccinations and booster for COVID.  Continue with COVID precautions.      She did get 1 shingles vaccine and she should complete the series whenever opportunity arises.  Forward us the records for that.      I would like her to continue her appointment with her  rheumatologist to further discuss her potential rheumatological issues.      Keep hydrated.  Little bit of sunshine exposure unless until it bothers her would be good.    Give a order for vitamin-D separately and also blood count again separately.    Send a prescription of baclofen separately.  Also meloxicam.    Follow-up in 6 months.     Current Outpatient Medications:     baclofen (LIORESAL) 10 MG tablet, Take 1 tablet (10 mg total) by mouth every evening., Disp: 30 tablet, Rfl: 6    calcium carbonate/vitamin D3 (CALTRATE 600 + D ORAL), , Disp: , Rfl:     cetirizine (ZYRTEC) 10 MG tablet, Take 10 mg by mouth once daily., Disp: , Rfl:     estradioL (ESTRACE) 1 MG tablet, TAKE 1 TABLET BY MOUTH EVERY DAY, Disp: 90 tablet, Rfl: 0    magnesium hydroxide 400 mg/5 ml (MILK OF MAGNESIA) 400 mg/5 mL Susp, , Disp: , Rfl:     meloxicam (MOBIC) 15 MG tablet, TAKE ONE TABLET BY MOUTH EVERY DAY THANK YOU!, Disp: 30 tablet, Rfl: 2    pregabalin (LYRICA) 50 MG capsule, Take 1 capsule (50 mg total) by mouth 3 (three) times daily., Disp: 90 capsule, Rfl: 6    Current Facility-Administered Medications:     LIDOcaine HCL 10 mg/ml (1%) injection 1 mL, 1 mL, Other, 1 time in Clinic/HOD, Meggan Patterson DO    methylPREDNISolone acetate injection 40 mg, 40 mg, Intra-Lesional, 1 time in Clinic/HOD, Meggan Patterson DO

## 2023-01-31 NOTE — PROGRESS NOTES
Give a order for vitamin-D separately and also blood count again separately.    Send a prescription of baclofen separately.  Also meloxicam.  I have sent the labs including CBC and vitamin-D to [facility].  Please do them in about 2-3 months time.  Repeating a CBC at this point when we have done it recently will not be helpful.      I have also sent a prescription for baclofen and meloxicam to your [facility] pharmacy.      Dr. Chanel GOODRICH

## 2023-02-05 ENCOUNTER — PATIENT MESSAGE (OUTPATIENT)
Dept: FAMILY MEDICINE | Facility: CLINIC | Age: 53
End: 2023-02-05

## 2023-03-13 ENCOUNTER — OFFICE VISIT (OUTPATIENT)
Dept: URGENT CARE | Facility: CLINIC | Age: 53
End: 2023-03-13
Payer: COMMERCIAL

## 2023-03-13 VITALS
SYSTOLIC BLOOD PRESSURE: 126 MMHG | DIASTOLIC BLOOD PRESSURE: 78 MMHG | BODY MASS INDEX: 25.43 KG/M2 | TEMPERATURE: 99 F | HEART RATE: 78 BPM | HEIGHT: 67 IN | WEIGHT: 162 LBS | OXYGEN SATURATION: 98 %

## 2023-03-13 DIAGNOSIS — J32.9 BACTERIAL SINUSITIS: Primary | ICD-10-CM

## 2023-03-13 DIAGNOSIS — B96.89 BACTERIAL SINUSITIS: Primary | ICD-10-CM

## 2023-03-13 PROCEDURE — 99202 PR OFFICE/OUTPT VISIT, NEW, LEVL II, 15-29 MIN: ICD-10-PCS | Mod: S$GLB,,, | Performed by: NURSE PRACTITIONER

## 2023-03-13 PROCEDURE — 99202 OFFICE O/P NEW SF 15 MIN: CPT | Mod: S$GLB,,, | Performed by: NURSE PRACTITIONER

## 2023-03-13 RX ORDER — PREDNISONE 10 MG/1
10 TABLET ORAL DAILY
Qty: 4 TABLET | Refills: 0 | Status: SHIPPED | OUTPATIENT
Start: 2023-03-13 | End: 2023-03-17

## 2023-03-13 RX ORDER — PREGABALIN 50 MG/1
50 CAPSULE ORAL 3 TIMES DAILY
COMMUNITY
Start: 2023-02-15 | End: 2023-05-25 | Stop reason: SDUPTHER

## 2023-03-13 RX ORDER — AZITHROMYCIN 250 MG/1
TABLET, FILM COATED ORAL
Qty: 6 TABLET | Refills: 0 | Status: SHIPPED | OUTPATIENT
Start: 2023-03-13 | End: 2023-05-25 | Stop reason: HOSPADM

## 2023-03-13 NOTE — PROGRESS NOTES
"Subjective:       Patient ID: Saima Winkler is a 52 y.o. female.    Vitals:  height is 5' 7" (1.702 m) and weight is 73.5 kg (162 lb). Her oral temperature is 98.7 °F (37.1 °C). Her blood pressure is 126/78 and her pulse is 78. Her oxygen saturation is 98%.     Chief Complaint: Sinus Problem (Sinus pressure, post nasal drip, started Saturday.) and Sore Throat (Started Saturday, c/o post nasal drip.)    This is a 52 y.o. female who presents today with a chief complaint of started Saturday with sinus pressure, congestion, post nasal drip and sore throat.  Patient presents with:  Sinus Problem: Sinus pressure, post nasal drip, started Saturday.  Sore Throat: Started Saturday, c/o post nasal drip.         Sinus Problem  This is a new problem. The current episode started in the past 7 days. The problem has been gradually worsening since onset. There has been no fever. Her pain is at a severity of 4/10. The pain is mild. Associated symptoms include congestion, headaches, sinus pressure and a sore throat. Treatments tried: zyrtec and flonase. The treatment provided mild relief.   Sore Throat   Associated symptoms include congestion and headaches.     HENT:  Positive for congestion, sinus pressure and sore throat.    Neurological:  Positive for headaches.     Objective:      Physical Exam   Constitutional: She is oriented to person, place, and time. normal  HENT:   Head: Normocephalic and atraumatic.   Ears:   Right Ear: Tympanic membrane, external ear and ear canal normal.   Left Ear: Tympanic membrane, external ear and ear canal normal.   Nose: Congestion present. Right sinus exhibits maxillary sinus tenderness. Left sinus exhibits maxillary sinus tenderness.   Mouth/Throat: Mucous membranes are moist. Oropharynx is clear.   Eyes: Conjunctivae are normal. Pupils are equal, round, and reactive to light. Extraocular movement intact   Neck: Neck supple.   Cardiovascular: Normal rate, regular rhythm, normal heart sounds " and normal pulses.   Pulmonary/Chest: Effort normal and breath sounds normal.   Abdominal: Normal appearance.   Musculoskeletal: Normal range of motion.         General: Normal range of motion.   Neurological: She is alert and oriented to person, place, and time.   Skin: Skin is warm and dry.   Psychiatric: Her behavior is normal. Mood normal.   Vitals reviewed.      Assessment:       1. Bacterial sinusitis          Plan:         Bacterial sinusitis  -     azithromycin (Z-BRITNI) 250 MG tablet; Take 2 tablets by mouth on day 1; Take 1 tablet by mouth on days 2-5  Dispense: 6 tablet; Refill: 0  -     predniSONE (DELTASONE) 10 MG tablet; Take 1 tablet (10 mg total) by mouth once daily. for 4 days  Dispense: 4 tablet; Refill: 0           Medical Decision Making:   Differential Diagnosis:   URI  Allergic Rhinitis

## 2023-03-20 DIAGNOSIS — M17.11 PRIMARY OSTEOARTHRITIS OF RIGHT KNEE: ICD-10-CM

## 2023-03-20 DIAGNOSIS — Z96.651 STATUS POST TOTAL KNEE REPLACEMENT, RIGHT: ICD-10-CM

## 2023-03-20 RX ORDER — PREGABALIN 50 MG/1
50 CAPSULE ORAL 3 TIMES DAILY
Qty: 90 CAPSULE | Refills: 2 | Status: SHIPPED | OUTPATIENT
Start: 2023-03-20 | End: 2023-04-19

## 2023-03-20 NOTE — TELEPHONE ENCOUNTER
Only taking lyrica, not gabapentin. See below. Order is pended for you to sign. She is taking 50 mg TID.       Sorry.  I told my pharmacy that I no longer needed gabapentin.  Im on lyrica and no longer taking gabapentin        You  Saima BERGER Devininder 43 minutes ago (8:48 AM)     LP  Good morning. We received a request for refill on gabapentin. In your medication history, we have you taking lyrica and gabapentin. Only one of these should be taken. Please clarify you are not taking one or the other and we can get the gabapentin refilled for you.

## 2023-05-07 DIAGNOSIS — N95.1 HOT FLASHES DUE TO MENOPAUSE: Chronic | ICD-10-CM

## 2023-05-08 RX ORDER — ESTRADIOL 1 MG/1
TABLET ORAL
Qty: 90 TABLET | Refills: 0 | Status: SHIPPED | OUTPATIENT
Start: 2023-05-08 | End: 2023-08-15 | Stop reason: SDUPTHER

## 2023-05-25 ENCOUNTER — OFFICE VISIT (OUTPATIENT)
Dept: CARDIOLOGY | Facility: CLINIC | Age: 53
End: 2023-05-25
Payer: COMMERCIAL

## 2023-05-25 VITALS
BODY MASS INDEX: 26.11 KG/M2 | WEIGHT: 166.38 LBS | DIASTOLIC BLOOD PRESSURE: 85 MMHG | HEIGHT: 67 IN | OXYGEN SATURATION: 98 % | SYSTOLIC BLOOD PRESSURE: 130 MMHG | HEART RATE: 67 BPM

## 2023-05-25 DIAGNOSIS — I49.3 PVC (PREMATURE VENTRICULAR CONTRACTION): ICD-10-CM

## 2023-05-25 DIAGNOSIS — F51.01 PRIMARY INSOMNIA: ICD-10-CM

## 2023-05-25 DIAGNOSIS — Z76.89 ENCOUNTER TO ESTABLISH CARE: ICD-10-CM

## 2023-05-25 DIAGNOSIS — Z91.89 CARDIOVASCULAR EVENT RISK: ICD-10-CM

## 2023-05-25 DIAGNOSIS — R06.09 DOE (DYSPNEA ON EXERTION): Primary | ICD-10-CM

## 2023-05-25 DIAGNOSIS — Z82.0 FAMILY HISTORY OF SLEEP APNEA: ICD-10-CM

## 2023-05-25 DIAGNOSIS — Z79.1 NSAID LONG-TERM USE: ICD-10-CM

## 2023-05-25 DIAGNOSIS — F10.10 ENGAGES IN BINGE CONSUMPTION OF ALCOHOL: ICD-10-CM

## 2023-05-25 DIAGNOSIS — M15.9 PRIMARY OSTEOARTHRITIS INVOLVING MULTIPLE JOINTS: ICD-10-CM

## 2023-05-25 DIAGNOSIS — Z87.891 HISTORY OF SMOKING FOR MORE THAN 10 YEARS: ICD-10-CM

## 2023-05-25 PROBLEM — M15.0 PRIMARY OSTEOARTHRITIS INVOLVING MULTIPLE JOINTS: Status: ACTIVE | Noted: 2023-05-25

## 2023-05-25 PROCEDURE — 99205 OFFICE O/P NEW HI 60 MIN: CPT | Mod: 25,S$GLB,, | Performed by: INTERNAL MEDICINE

## 2023-05-25 PROCEDURE — 3008F PR BODY MASS INDEX (BMI) DOCUMENTED: ICD-10-PCS | Mod: S$GLB,,, | Performed by: INTERNAL MEDICINE

## 2023-05-25 PROCEDURE — 1159F PR MEDICATION LIST DOCUMENTED IN MEDICAL RECORD: ICD-10-PCS | Mod: S$GLB,,, | Performed by: INTERNAL MEDICINE

## 2023-05-25 PROCEDURE — 3008F BODY MASS INDEX DOCD: CPT | Mod: S$GLB,,, | Performed by: INTERNAL MEDICINE

## 2023-05-25 PROCEDURE — 99205 PR OFFICE/OUTPT VISIT, NEW, LEVL V, 60-74 MIN: ICD-10-PCS | Mod: 25,S$GLB,, | Performed by: INTERNAL MEDICINE

## 2023-05-25 PROCEDURE — 3075F PR MOST RECENT SYSTOLIC BLOOD PRESS GE 130-139MM HG: ICD-10-PCS | Mod: S$GLB,,, | Performed by: INTERNAL MEDICINE

## 2023-05-25 PROCEDURE — 99999 PR PBB SHADOW E&M-EST. PATIENT-LVL III: CPT | Mod: PBBFAC,,, | Performed by: INTERNAL MEDICINE

## 2023-05-25 PROCEDURE — 1160F PR REVIEW ALL MEDS BY PRESCRIBER/CLIN PHARMACIST DOCUMENTED: ICD-10-PCS | Mod: S$GLB,,, | Performed by: INTERNAL MEDICINE

## 2023-05-25 PROCEDURE — 3079F DIAST BP 80-89 MM HG: CPT | Mod: S$GLB,,, | Performed by: INTERNAL MEDICINE

## 2023-05-25 PROCEDURE — 3044F HG A1C LEVEL LT 7.0%: CPT | Mod: S$GLB,,, | Performed by: INTERNAL MEDICINE

## 2023-05-25 PROCEDURE — 93000 ELECTROCARDIOGRAM COMPLETE: CPT | Mod: S$GLB,,, | Performed by: INTERNAL MEDICINE

## 2023-05-25 PROCEDURE — 1159F MED LIST DOCD IN RCRD: CPT | Mod: S$GLB,,, | Performed by: INTERNAL MEDICINE

## 2023-05-25 PROCEDURE — 99999 PR PBB SHADOW E&M-EST. PATIENT-LVL III: ICD-10-PCS | Mod: PBBFAC,,, | Performed by: INTERNAL MEDICINE

## 2023-05-25 PROCEDURE — 3079F PR MOST RECENT DIASTOLIC BLOOD PRESSURE 80-89 MM HG: ICD-10-PCS | Mod: S$GLB,,, | Performed by: INTERNAL MEDICINE

## 2023-05-25 PROCEDURE — 1160F RVW MEDS BY RX/DR IN RCRD: CPT | Mod: S$GLB,,, | Performed by: INTERNAL MEDICINE

## 2023-05-25 PROCEDURE — 3044F PR MOST RECENT HEMOGLOBIN A1C LEVEL <7.0%: ICD-10-PCS | Mod: S$GLB,,, | Performed by: INTERNAL MEDICINE

## 2023-05-25 PROCEDURE — 93000 EKG 12-LEAD: ICD-10-PCS | Mod: S$GLB,,, | Performed by: INTERNAL MEDICINE

## 2023-05-25 PROCEDURE — 3075F SYST BP GE 130 - 139MM HG: CPT | Mod: S$GLB,,, | Performed by: INTERNAL MEDICINE

## 2023-05-25 RX ORDER — PHENTERMINE HYDROCHLORIDE 37.5 MG/1
37.5 TABLET ORAL EVERY MORNING
COMMUNITY
Start: 2023-04-13 | End: 2023-05-25 | Stop reason: HOSPADM

## 2023-05-25 RX ORDER — PREGABALIN 50 MG/1
50 CAPSULE ORAL 3 TIMES DAILY
COMMUNITY
Start: 2023-05-24 | End: 2023-10-07 | Stop reason: SDUPTHER

## 2023-05-25 NOTE — PROGRESS NOTES
Subjective:    Patient ID:  Saima Winkler is a 52 y.o. female who presents for evaluation of No chief complaint on file.  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     Health literacy: high   Vaccinations: up-to-date, completed COVID, infection 9/2022, no residual  Activities: on feet all day, 12K steps daily, some walking, some limitation with LESTER, step climbing  Nicotine: 10 years, half ppd, quit 2007  Alcohol: max 6 glasses of wine, once a month.  Illicit drugs: none  Cardiac symptoms: LESTER  Home BP: do not check  Medication compliance: yes, no cardiac meds  Diet: regular, use salt  Caffeine: 1-2  cpd  Labs:   Lab Results   Component Value Date    TSH 1.030 01/26/2023        Lab Results   Component Value Date    HGBA1C 5.4 01/26/2023       Lab Results   Component Value Date    WBC 6.37 01/26/2023    HGB 12.7 01/26/2023    HCT 37.6 01/26/2023    MCV 91 01/26/2023     01/26/2023       CMP  Sodium   Date Value Ref Range Status   09/07/2022 137 136 - 145 mmol/L Final     Potassium   Date Value Ref Range Status   09/07/2022 5.1 3.5 - 5.1 mmol/L Final     Chloride   Date Value Ref Range Status   09/07/2022 103 95 - 110 mmol/L Final     CO2   Date Value Ref Range Status   09/07/2022 25 23 - 29 mmol/L Final     Glucose   Date Value Ref Range Status   09/07/2022 101 70 - 110 mg/dL Final     BUN   Date Value Ref Range Status   09/07/2022 20 6 - 20 mg/dL Final     Creatinine   Date Value Ref Range Status   09/07/2022 0.7 0.5 - 1.4 mg/dL Final     Calcium   Date Value Ref Range Status   09/07/2022 9.3 8.7 - 10.5 mg/dL Final     Total Protein   Date Value Ref Range Status   09/07/2022 6.9 6.0 - 8.4 g/dL Final     Albumin   Date Value Ref Range Status   09/07/2022 4.0 3.5 - 5.2 g/dL Final     Total Bilirubin   Date Value Ref  Range Status   09/07/2022 0.5 0.1 - 1.0 mg/dL Final     Comment:     For infants and newborns, interpretation of results should be based  on gestational age, weight and in agreement with clinical  observations.    Premature Infant recommended reference ranges:  Up to 24 hours.............<8.0 mg/dL  Up to 48 hours............<12.0 mg/dL  3-5 days..................<15.0 mg/dL  6-29 days.................<15.0 mg/dL       Alkaline Phosphatase   Date Value Ref Range Status   09/07/2022 52 (L) 55 - 135 U/L Final     AST   Date Value Ref Range Status   09/07/2022 22 10 - 40 U/L Final     ALT   Date Value Ref Range Status   09/07/2022 18 10 - 44 U/L Final     Anion Gap   Date Value Ref Range Status   09/07/2022 9 8 - 16 mmol/L Final     eGFR if    Date Value Ref Range Status   01/23/2020 124 > OR = 60 mL/min/1.73m2 Final     eGFR if non    Date Value Ref Range Status   01/23/2020 107 > OR = 60 mL/min/1.73m2 Final     @labrcntip(troponini)@  No results found for: BNP}   Lab Results   Component Value Date    CHOL 195 01/26/2023     Lab Results   Component Value Date     (H) 01/26/2023     Lab Results   Component Value Date    LDLCALC 62.4 (L) 01/26/2023     Lab Results   Component Value Date    TRIG 48 01/26/2023     Lab Results   Component Value Date    CHOLHDL 63.1 (H) 01/26/2023      Last Echo: none  Last stress test: none  Cardiovascular angiogram: none  ECG: NSR, rate 63, normal  Fundoscopic exam: within the past year, negative for retinopathy    WF came on own due to ECG noted RBBB at time of TKR. No CV symptoms. Some family history with father dying at age 65 with SCD, had CHF, brother with SSS requiring PPM at age 43. Overall very low ASCVD 10 event risk of < 1%.       Review of Systems   Constitutional: Negative for diaphoresis, fever, malaise/fatigue, night sweats and weight gain.   HENT:  Negative for nosebleeds and tinnitus.    Eyes:  Negative for visual disturbance.    Cardiovascular:  Positive for dyspnea on exertion and irregular heartbeat. Negative for chest pain, claudication, cyanosis, leg swelling, near-syncope, orthopnea, palpitations and paroxysmal nocturnal dyspnea.   Respiratory:  Negative for cough, shortness of breath, sleep disturbances due to breathing, snoring and wheezing.         [place] score 6, awaken tired with insomnia, father and brother both have RUPA   Endocrine: Negative for polydipsia and polyuria.   Hematologic/Lymphatic: Bruises/bleeds easily.   Skin:  Positive for flushing. Negative for color change, nail changes, poor wound healing and suspicious lesions.   Musculoskeletal:  Positive for arthritis, back pain, joint pain, joint swelling, muscle cramps, myalgias, neck pain and stiffness. Negative for falls, gout and muscle weakness.   Gastrointestinal:  Positive for bloating, constipation, flatus and hemorrhoids. Negative for heartburn, hematemesis, hematochezia, melena and nausea.   Neurological:  Positive for difficulty with concentration, headaches and paresthesias. Negative for disturbances in coordination, excessive daytime sleepiness, dizziness, focal weakness, light-headedness, loss of balance, numbness, vertigo and weakness.   Psychiatric/Behavioral:  Positive for depression. Negative for substance abuse. The patient has insomnia and is nervous/anxious.       Objective:    Physical Exam  Constitutional:       Appearance: She is well-developed.      Comments: RA O2 sat 98%   HENT:      Head: Normocephalic.   Eyes:      Conjunctiva/sclera: Conjunctivae normal.      Pupils: Pupils are equal, round, and reactive to light.   Neck:      Thyroid: No thyromegaly.      Vascular: No JVD.   Cardiovascular:      Rate and Rhythm: Normal rate and regular rhythm.      Pulses: Intact distal pulses.           Carotid pulses are 1+ on the right side and 1+ on the left side.       Radial pulses are 1+ on the right side and 1+ on the left side.        Dorsalis  "pedis pulses are 1+ on the right side and 1+ on the left side.        Posterior tibial pulses are 1+ on the right side and 1+ on the left side.      Heart sounds: Normal heart sounds. No murmur heard.    No friction rub. No gallop.   Pulmonary:      Effort: Pulmonary effort is normal.      Breath sounds: Normal breath sounds. No rales.   Chest:      Chest wall: No tenderness.   Abdominal:      General: Bowel sounds are normal.      Palpations: Abdomen is soft.      Tenderness: There is no abdominal tenderness.      Comments: Waist 34"   Musculoskeletal:         General: Normal range of motion.      Cervical back: Normal range of motion and neck supple.   Lymphadenopathy:      Cervical: No cervical adenopathy.   Skin:     General: Skin is warm and dry.      Findings: No rash.   Neurological:      Mental Status: She is alert and oriented to person, place, and time.         Assessment:       1. LESTER (dyspnea on exertion), 2022, with step climbing    2. Encounter to establish care    3. History of smoking for more than 10 years, max half ppd, quit 2007    4. Engages in binge consumption of alcohol    5. NSAID long-term use, Mobic daily since TKR 9/2022    6. Cardiovascular event risk, ASCVD 10-year risk 0.6%, 2022    7. Family history of sleep apnea    8. PVC (premature ventricular contraction)    9. Primary osteoarthritis involving multiple joints    10. Primary insomnia         Plan:       Diagnoses and all orders for this visit:    LESTER (dyspnea on exertion), 2022, with step climbing  -     IN OFFICE EKG 12-LEAD (to Muse)    Encounter to establish care    History of smoking for more than 10 years, max half ppd, quit 2007    Engages in binge consumption of alcohol    NSAID long-term use, Mobic daily since TKR 9/2022    Cardiovascular event risk, ASCVD 10-year risk 0.6%, 2022    Family history of sleep apnea    PVC (premature ventricular contraction)    Primary osteoarthritis involving multiple joints    Primary " "insomnia     - All medical issues reviewed, continue current Rx.  - Very low ASCVD risk, follow up only as needed. Any testing is optional  - CBT, cognitive behavior therapy for sleep disorder. Info on RUPA  - Refer to "Chronic Pain" article in Consumer Report 6/2019 issue.  - Avoid use of NSAID if possible, Tylenol is safer. Can use up to 3000 mg per day, use early with onset of even mild pains.   - CV status and medications reviewed, patient acknowledge good understanding.  - Recommend healthy living: moderate alcohol, healthy diet and regular exercise aiming for fitness, restorative sleep and weight control  - Discussed healthy alcohol daily limit of 0.5 oz of pure alcohol in any 24 hours (roughly one 12-oz beers, 5 oz of wine (8%-12% alcohol), or 0.75 oz (half a shot) of liquor (80 proof)), can not save up.   - Need good exercise program, 4 key elements: 1. Aerobic (walking, swimming, dancing, jogging, biking, etc, 2. Muscle strengthening / resistance exercise, need to do 2-3 times weekly, 3. Stretching daily, good stretch takes a whole  total minute. 4. Balance exercise daily.   - Instruction for Mediterranean diet and heart healthy exercise given.  - Highly recommend 30-60 minutes of exercise / activities daily, can have Sunday off, with 2-3 sessions of muscle strengthening weekly. A  would be very helpful.  - Consider HIIT, at least 10 minutes daily.     Total time spend including review of record prior to face-to-face visit is 60 minutes. Greater than 50% of the time was spent in counseling and coordination of care. The above assessment and plan have been discussed at length. Referring provider's note reviewed. Labs and procedure over the last 6 months reviewed. Problem List updated. Asked to bring in all active medications / pills bottles with next visit. Will send note to referring / PCP.                "

## 2023-05-25 NOTE — PATIENT INSTRUCTIONS
Recommended Mediterranean dietEating Heart-Healthy Food: Using the DASH Plan  Eating for your heart doesnt have to be hard or boring. You just need to know how to make healthier choices. The DASH eating plan has been developed to help you do just that. DASH stands for Dietary Approaches to Stop Hypertension. It is a plan that has been proven to be healthier for your heart and to lower your risk for high blood pressure. It can also help lower your risk for cancer, heart disease, osteoporosis, and diabetes.  Choosing from Each Food Group  Choose foods from each of the food groups below each day. Try to get the recommended number of servings for each food group. The serving numbers are based on a diet of 2,000 calories a day. Talk to your doctor if youre unsure about your calorie needs.  Grains   Servings: 7-8 a day  A serving is:  1 slice bread  1 ounce dry cereal  half a cup cooked rice or pasta  Best choices: Whole grains and any grains high in fiber.  Vegetables   Servings: 4-5 a day  A serving is:  1 cup raw leafy vegetable  Half a cup cooked vegetable  Three-quarter cup vegetable juice  Best choices: Fresh or frozen vegetable prepared without too much added salt or fat.    Fruits   Servings: 4-5 a day  A serving is:  Three-quarter cup fruit juice  1 medium fruit  One-quarter cup dried fruit  One-half cup fresh, frozen, or canned fruit  Best choices: A variety of fresh fruits of different colors. Whole fruits are a much better choice than fruit juices.  Low-fat or Fat Free Dairy   Servings: 2-3 a day  A serving is:  8 ounces milk  1 cup yogurt  One and a half ounces cheese  Best choices: Skim or 1% milk, low-fat or fat free yogurt or buttermilk, and low-fat cheeses.       Meat, Poultry, Fish   Servings: 2 or fewer a day  A serving is:  3 ounces cooked meat, poultry, or fish  Best choices: Lean meats and fish. Trim away visible fat. Broil, roast, or boil instead of frying. Remove skin from poultry before eating.   Nuts, Seeds, Beans   Servings: 4-5 a week  A serving is:  One third cup nuts (or one and a half ounces)  2 tablespoons sunflower seeds  Half a cup cooked beans  Best choices: Dry roasted nuts with no salt added, lentils, kidney beans, garbanzo beans, and whole haley beans.    Fats and Oils   Servings: 2 a day  A serving is:  1 teaspoon vegetable oil  1 teaspoon soft margarine  1 tablespoon low-fat mayonnaise  1 teaspoon regular mayonnaise  2 tablespoons light salad dressing  1 tablespoon regular salad dressing  Best choices: Monounsaturated and polyunsaturated fats such as olive, canola, or safflower oil.  Sweets   Servings: 5 a week or fewer  A serving is:  1 tablespoon sugar, maple syrup, or honey  1 tablespoon jam or jelly  1 half-ounce jelly beans (about 15)  8 ounces lemonade  Best choices: Dried fruit can be a satisfying sweet. Choose low-fat sweets when possible. And watch your serving sizes!       Aerobic Exercise for a Healthy Heart  Exercise is a lot more than an energy booster and a stress reliever. It also strengthens your heart muscle, lowers your blood pressure and blood cholesterol, and burns calories.      Remember, some activity is better than none.     Choose an Aerobic Activity  Choose a nonstop activity that makes your heart and lungs work harder than they do when you rest or walk normally. This aerobic exercise can improve the way your heart and other muscles use oxygen. Make it fun by exercising with a friend and choosing an activity you enjoy. Here are some ideas:  Walking  Swimming  Bicycling  Stair climbing  Dancing  Jogging  Exercise Regularly  If you havent been exercising regularly,  get your doctors okay first. Then start slowly.  Here are some tips:  Begin exercising 3 times a week for 5-10 minutes at a time.  When you feel comfortable, add a few minutes each week.  Slowly build up to exercising 3-4 times each week for 20-40 minutes. Aim for a total of 150 or more minutes a  week.  Be sure to carry your nitroglycerin with you when you exercise.  If you get angina when youre exercising, stop what youre doing, take your nitroglycerin, and call your doctor.  © 8089-7613 [employer], [address] [place] [address], [place] [address]. All rights reserved. This information is not intended as a substitute for professional medical care. Always follow your healthcare professional's instructions.

## 2023-07-13 DIAGNOSIS — Z12.31 ENCOUNTER FOR SCREENING MAMMOGRAM FOR MALIGNANT NEOPLASM OF BREAST: Primary | ICD-10-CM

## 2023-07-19 ENCOUNTER — HOSPITAL ENCOUNTER (OUTPATIENT)
Dept: RADIOLOGY | Facility: HOSPITAL | Age: 53
Discharge: HOME | End: 2023-07-19
Attending: INTERNAL MEDICINE
Payer: COMMERCIAL

## 2023-07-19 DIAGNOSIS — Z12.31 ENCOUNTER FOR SCREENING MAMMOGRAM FOR MALIGNANT NEOPLASM OF BREAST: ICD-10-CM

## 2023-07-19 PROCEDURE — 77067 SCR MAMMO BI INCL CAD: CPT | Mod: TC,PO

## 2023-07-24 ENCOUNTER — TELEPHONE (OUTPATIENT)
Dept: NEUROSURGERY | Facility: CLINIC | Age: 53
End: 2023-07-24
Payer: COMMERCIAL

## 2023-07-24 NOTE — TELEPHONE ENCOUNTER
Spoke with patient to explain that MRI would be required prior to appt with Neurosurgery. Offered to schedule with Dr. Vance, patient declined reporting that she as been feeling better and will call for any needs in the future.

## 2023-08-09 ENCOUNTER — OFFICE VISIT (OUTPATIENT)
Dept: URGENT CARE | Facility: CLINIC | Age: 53
End: 2023-08-09
Payer: COMMERCIAL

## 2023-08-09 VITALS
DIASTOLIC BLOOD PRESSURE: 89 MMHG | OXYGEN SATURATION: 100 % | SYSTOLIC BLOOD PRESSURE: 129 MMHG | RESPIRATION RATE: 18 BRPM | HEART RATE: 66 BPM | TEMPERATURE: 98 F | BODY MASS INDEX: 25.9 KG/M2 | WEIGHT: 165 LBS | HEIGHT: 67 IN

## 2023-08-09 DIAGNOSIS — J02.9 SORE THROAT: ICD-10-CM

## 2023-08-09 DIAGNOSIS — R05.9 COUGH, UNSPECIFIED TYPE: ICD-10-CM

## 2023-08-09 DIAGNOSIS — R50.9 FEVER, UNSPECIFIED FEVER CAUSE: ICD-10-CM

## 2023-08-09 DIAGNOSIS — B97.89 VIRAL RESPIRATORY ILLNESS: ICD-10-CM

## 2023-08-09 DIAGNOSIS — R09.81 NASAL CONGESTION: Primary | ICD-10-CM

## 2023-08-09 DIAGNOSIS — J98.8 VIRAL RESPIRATORY ILLNESS: ICD-10-CM

## 2023-08-09 LAB
CTP QC/QA: YES
CTP QC/QA: YES
MOLECULAR STREP A: NEGATIVE
SARS-COV-2 AG RESP QL IA.RAPID: NEGATIVE

## 2023-08-09 PROCEDURE — 99213 OFFICE O/P EST LOW 20 MIN: CPT | Mod: S$GLB,,,

## 2023-08-09 PROCEDURE — 87811 SARS CORONAVIRUS 2 ANTIGEN POCT, MANUAL READ: ICD-10-PCS | Mod: QW,S$GLB,,

## 2023-08-09 PROCEDURE — 99213 PR OFFICE/OUTPT VISIT, EST, LEVL III, 20-29 MIN: ICD-10-PCS | Mod: S$GLB,,,

## 2023-08-09 PROCEDURE — 87811 SARS-COV-2 COVID19 W/OPTIC: CPT | Mod: QW,S$GLB,,

## 2023-08-09 PROCEDURE — 87651 POCT STREP A MOLECULAR: ICD-10-PCS | Mod: QW,,,

## 2023-08-09 PROCEDURE — 87651 STREP A DNA AMP PROBE: CPT | Mod: QW,,,

## 2023-08-09 RX ORDER — PROMETHAZINE HYDROCHLORIDE AND DEXTROMETHORPHAN HYDROBROMIDE 6.25; 15 MG/5ML; MG/5ML
5 SYRUP ORAL NIGHTLY PRN
Qty: 118 ML | Refills: 0 | Status: SHIPPED | OUTPATIENT
Start: 2023-08-09 | End: 2023-08-15

## 2023-08-09 RX ORDER — PREDNISONE 10 MG/1
TABLET ORAL
Qty: 8 TABLET | Refills: 0 | Status: SHIPPED | OUTPATIENT
Start: 2023-08-09 | End: 2023-08-15

## 2023-08-09 NOTE — PROGRESS NOTES
"Subjective:       Patient ID: Saima Winkler is a 53 y.o. female.    Vitals:  height is 5' 7" (1.702 m) and weight is 74.8 kg (165 lb). Her oral temperature is 97.8 °F (36.6 °C). Her blood pressure is 129/89 and her pulse is 66. Her respiration is 18 and oxygen saturation is 100%.     Chief Complaint: Sore Throat (X 3 days with sore throat, nasal congestion, body aches, fatigue, headache.)    This is a 53 y.o. female who presents today with a chief complaint of   Patient presents with:  Sore Throat: X 3 days with sore throat, nasal congestion, body aches,X 3 days with sore throat, nasal congestion, body aches, fatigue, headache. fatigue, headache.         Sore Throat   This is a new problem. The current episode started in the past 7 days. The problem has been unchanged. The pain is at a severity of 5/10. The pain is moderate. Associated symptoms include congestion, coughing, ear pain and headaches. She has tried acetaminophen (zyrtec, flonsase) for the symptoms. The treatment provided mild relief.       Constitution: Negative.   HENT:  Positive for ear pain, congestion, sinus pressure and sore throat.    Neck: neck negative.   Cardiovascular: Negative.    Eyes: Negative.    Respiratory:  Positive for cough.    Gastrointestinal: Negative.    Endocrine: negative.   Genitourinary: Negative.    Musculoskeletal: Negative.    Skin: Negative.    Allergic/Immunologic: Negative.    Neurological:  Positive for headaches.   Hematologic/Lymphatic: Negative.    Psychiatric/Behavioral: Negative.             Objective:      Physical Exam   Constitutional: She is oriented to person, place, and time. She is cooperative. She is easily aroused. awake  HENT:   Head: Normocephalic and atraumatic.   Ears:   Right Ear: Tympanic membrane, external ear and ear canal normal.   Left Ear: Tympanic membrane, external ear and ear canal normal.   Nose: Congestion present. Right sinus exhibits maxillary sinus tenderness and frontal sinus " tenderness. Left sinus exhibits maxillary sinus tenderness and frontal sinus tenderness.   Mouth/Throat: Posterior oropharyngeal erythema present.   Eyes: Conjunctivae are normal. Pupils are equal, round, and reactive to light.   Neck: Neck supple.   Cardiovascular: Normal rate, regular rhythm, normal heart sounds and normal pulses.   Pulmonary/Chest: Effort normal and breath sounds normal.   Abdominal: Normal appearance.   Musculoskeletal: Normal range of motion.         General: Normal range of motion.   Neurological: no focal deficit. She is alert, oriented to person, place, and time, easily aroused and at baseline.   Skin: Skin is warm. Capillary refill takes less than 2 seconds.   Psychiatric: Her behavior is normal. Mood, judgment and thought content normal.   Nursing note and vitals reviewed.        Past medical history and current medications reviewed.       Assessment:     Results for orders placed or performed in visit on 08/09/23   SARS Coronavirus 2 Antigen, POCT Manual Read   Result Value Ref Range    SARS Coronavirus 2 Antigen Negative Negative     Acceptable Yes    POCT Strep A, Molecular   Result Value Ref Range    Molecular Strep A, POC Negative Negative     Acceptable Yes              1. Nasal congestion    2. Fever, unspecified fever cause    3. Sore throat    4. Cough, unspecified type    5. Viral respiratory illness              Plan:         Nasal congestion  -     SARS Coronavirus 2 Antigen, POCT Manual Read  -     POCT Strep A, Molecular  -     promethazine-dextromethorphan (PROMETHAZINE-DM) 6.25-15 mg/5 mL Syrp; Take 5 mLs by mouth nightly as needed (cough).  Dispense: 118 mL; Refill: 0  -     predniSONE (DELTASONE) 10 MG tablet; Take 20 mg on day one then take 10 mg on day 2-7  Dispense: 8 tablet; Refill: 0    Fever, unspecified fever cause  -     SARS Coronavirus 2 Antigen, POCT Manual Read  -     POCT Strep A, Molecular  -     promethazine-dextromethorphan  (PROMETHAZINE-DM) 6.25-15 mg/5 mL Syrp; Take 5 mLs by mouth nightly as needed (cough).  Dispense: 118 mL; Refill: 0  -     predniSONE (DELTASONE) 10 MG tablet; Take 20 mg on day one then take 10 mg on day 2-7  Dispense: 8 tablet; Refill: 0    Sore throat  -     promethazine-dextromethorphan (PROMETHAZINE-DM) 6.25-15 mg/5 mL Syrp; Take 5 mLs by mouth nightly as needed (cough).  Dispense: 118 mL; Refill: 0  -     predniSONE (DELTASONE) 10 MG tablet; Take 20 mg on day one then take 10 mg on day 2-7  Dispense: 8 tablet; Refill: 0    Cough, unspecified type  -     promethazine-dextromethorphan (PROMETHAZINE-DM) 6.25-15 mg/5 mL Syrp; Take 5 mLs by mouth nightly as needed (cough).  Dispense: 118 mL; Refill: 0  -     predniSONE (DELTASONE) 10 MG tablet; Take 20 mg on day one then take 10 mg on day 2-7  Dispense: 8 tablet; Refill: 0    Viral respiratory illness  -     promethazine-dextromethorphan (PROMETHAZINE-DM) 6.25-15 mg/5 mL Syrp; Take 5 mLs by mouth nightly as needed (cough).  Dispense: 118 mL; Refill: 0  -     predniSONE (DELTASONE) 10 MG tablet; Take 20 mg on day one then take 10 mg on day 2-7  Dispense: 8 tablet; Refill: 0             Patient Instructions   Please return here or go to the Emergency Department for any concerns or worsening of condition.  Please drink plenty of fluids.  Please get plenty of rest.  If you were prescribed antibiotics, please take them to completion.  If you were given wait & see antibiotics, please wait 5-7 days before taking them, and only take them if your symptoms have worsened or not improved.  If you do begin taking the antibiotics, please take them to completion.  If you were given a steroid shot in the clinic and have also been given a prescription for a steroid such as Prednisone or a Medrol Dose Pack, please begin taking them tomorrow.  If you do not have Hypertension or any history of palpitations, it is ok to take over the counter Sudafed or Mucinex D or Allegra-D or  Claritin-D or Zyrtec-D.  If you do take one of the above, it is ok to combine that with plain over the counter Mucinex or Allegra or Claritin or Zyrtec.  If for example you are taking Zyrtec -D, you can combine that with Mucinex, but not Mucinex-D.  If you are taking Mucinex-D, you can combine that with plain Allegra or Claritin or Zyrtec.   If you do have Hypertension or palpitations, it is safe to take Coricidin HBP for relief of sinus symptoms.  If not allergic, please take over the counter Tylenol (Acetaminophen) and/or Motrin (Ibuprofen) as directed for control of pain and/or fever.  Please follow up with your primary care doctor or specialist as needed.    If you  smoke, please stop smoking.         OVER THE COUNTER RECOMMENDATIONS/SUGGESTIONS.  ·         Make sure to stay well hydrated.  ·         Use Nasal Saline to mechanically move any post nasal drip from your eustachian tube or from the back of your throat.  ·         Use warm saltwater gargles to ease your throat pain. Warm saltwater gargles as needed for sore throat-  1/2 tsp salt to 1 cup warm water, gargle as desired.  ·         Use an antihistamine such as Claritin, Zyrtec or Allegra to dry you out.  ·         Use pseudoephedrine (behind the counter) to decongest. Pseudoephedrine  30 mg up to 240 mg /day. It can raise your blood pressure and give you palpitations.  ·         Use Mucinex (guaifenisin) to break up mucous up to 2400mg/day to loosen any mucous.  ·         The Mucinex DM pill has a cough suppressant that can be sedating. It can be used at night to stop the tickle at the back of your throat.  ·         You can use Mucinex D (it has guaifenesin and a high dose of pseudoephedrine) in the mornings to help decongest.  ·         Use Afrin (oxymetazoline) in each nare for no longer than 3 days, as it is addictive. It can also dry out your mucous membranes and cause elevated blood pressure. This is especially useful if you are flying.  ·          Use Flonase 1-2 sprays/nostril per day. It is a local acting steroid nasal spray, if you develop a bloody nose, stop using the medication immediately.  ·         Sometimes Nyquil at night is beneficial to help you get some rest, however it is sedating, and it does have an antihistamine, and Tylenol.  ·         Honey is a natural cough suppressant that can be used.  ·         Tylenol up to 4,000 mg a day is safe for short periods and can be used for body aches, pain, and fever. However, in high doses and prolonged use it can cause liver irritation.  ·         Ibuprofen is a non-steroidal anti-inflammatory that can be used for body aches, pain, and fever. However, it can also cause stomach irritation if overused.

## 2023-08-09 NOTE — PATIENT INSTRUCTIONS
Please return here or go to the Emergency Department for any concerns or worsening of condition.  Please drink plenty of fluids.  Please get plenty of rest.  If you were prescribed antibiotics, please take them to completion.  If you were given wait & see antibiotics, please wait 5-7 days before taking them, and only take them if your symptoms have worsened or not improved.  If you do begin taking the antibiotics, please take them to completion.  If you were given a steroid shot in the clinic and have also been given a prescription for a steroid such as Prednisone or a Medrol Dose Pack, please begin taking them tomorrow.  If you do not have Hypertension or any history of palpitations, it is ok to take over the counter Sudafed or Mucinex D or Allegra-D or Claritin-D or Zyrtec-D.  If you do take one of the above, it is ok to combine that with plain over the counter Mucinex or Allegra or Claritin or Zyrtec.  If for example you are taking Zyrtec -D, you can combine that with Mucinex, but not Mucinex-D.  If you are taking Mucinex-D, you can combine that with plain Allegra or Claritin or Zyrtec.   If you do have Hypertension or palpitations, it is safe to take Coricidin HBP for relief of sinus symptoms.  If not allergic, please take over the counter Tylenol (Acetaminophen) and/or Motrin (Ibuprofen) as directed for control of pain and/or fever.  Please follow up with your primary care doctor or specialist as needed.    If you  smoke, please stop smoking.         OVER THE COUNTER RECOMMENDATIONS/SUGGESTIONS.  ·         Make sure to stay well hydrated.  ·         Use Nasal Saline to mechanically move any post nasal drip from your eustachian tube or from the back of your throat.  ·         Use warm saltwater gargles to ease your throat pain. Warm saltwater gargles as needed for sore throat-  1/2 tsp salt to 1 cup warm water, gargle as desired.  ·         Use an antihistamine such as Claritin, Zyrtec or Allegra to dry you  out.  ·         Use pseudoephedrine (behind the counter) to decongest. Pseudoephedrine  30 mg up to 240 mg /day. It can raise your blood pressure and give you palpitations.  ·         Use Mucinex (guaifenisin) to break up mucous up to 2400mg/day to loosen any mucous.  ·         The Mucinex DM pill has a cough suppressant that can be sedating. It can be used at night to stop the tickle at the back of your throat.  ·         You can use Mucinex D (it has guaifenesin and a high dose of pseudoephedrine) in the mornings to help decongest.  ·         Use Afrin (oxymetazoline) in each nare for no longer than 3 days, as it is addictive. It can also dry out your mucous membranes and cause elevated blood pressure. This is especially useful if you are flying.  ·         Use Flonase 1-2 sprays/nostril per day. It is a local acting steroid nasal spray, if you develop a bloody nose, stop using the medication immediately.  ·         Sometimes Nyquil at night is beneficial to help you get some rest, however it is sedating, and it does have an antihistamine, and Tylenol.  ·         Honey is a natural cough suppressant that can be used.  ·         Tylenol up to 4,000 mg a day is safe for short periods and can be used for body aches, pain, and fever. However, in high doses and prolonged use it can cause liver irritation.  ·         Ibuprofen is a non-steroidal anti-inflammatory that can be used for body aches, pain, and fever. However, it can also cause stomach irritation if overused.

## 2023-08-14 ENCOUNTER — TELEPHONE (OUTPATIENT)
Dept: FAMILY MEDICINE | Facility: CLINIC | Age: 53
End: 2023-08-14

## 2023-08-14 ENCOUNTER — LAB VISIT (OUTPATIENT)
Dept: LAB | Facility: HOSPITAL | Age: 53
End: 2023-08-14
Attending: INTERNAL MEDICINE
Payer: COMMERCIAL

## 2023-08-14 DIAGNOSIS — E55.9 VITAMIN D DEFICIENCY: ICD-10-CM

## 2023-08-14 DIAGNOSIS — D72.820 LYMPHOCYTOSIS: ICD-10-CM

## 2023-08-14 LAB
25(OH)D3+25(OH)D2 SERPL-MCNC: 44 NG/ML (ref 30–96)
BASOPHILS # BLD AUTO: 0.08 K/UL (ref 0–0.2)
BASOPHILS NFR BLD: 0.8 % (ref 0–1.9)
DIFFERENTIAL METHOD: ABNORMAL
EOSINOPHIL # BLD AUTO: 0.2 K/UL (ref 0–0.5)
EOSINOPHIL NFR BLD: 1.8 % (ref 0–8)
ERYTHROCYTE [DISTWIDTH] IN BLOOD BY AUTOMATED COUNT: 12.4 % (ref 11.5–14.5)
HCT VFR BLD AUTO: 38.5 % (ref 37–48.5)
HGB BLD-MCNC: 13 G/DL (ref 12–16)
IMM GRANULOCYTES # BLD AUTO: 0.08 K/UL (ref 0–0.04)
IMM GRANULOCYTES NFR BLD AUTO: 0.8 % (ref 0–0.5)
LYMPHOCYTES # BLD AUTO: 3.3 K/UL (ref 1–4.8)
LYMPHOCYTES NFR BLD: 32.8 % (ref 18–48)
MCH RBC QN AUTO: 31.4 PG (ref 27–31)
MCHC RBC AUTO-ENTMCNC: 33.8 G/DL (ref 32–36)
MCV RBC AUTO: 93 FL (ref 82–98)
MONOCYTES # BLD AUTO: 0.7 K/UL (ref 0.3–1)
MONOCYTES NFR BLD: 7.2 % (ref 4–15)
NEUTROPHILS # BLD AUTO: 5.6 K/UL (ref 1.8–7.7)
NEUTROPHILS NFR BLD: 56.6 % (ref 38–73)
NRBC BLD-RTO: 0 /100 WBC
PLATELET # BLD AUTO: 282 K/UL (ref 150–450)
PMV BLD AUTO: 8.5 FL (ref 9.2–12.9)
RBC # BLD AUTO: 4.14 M/UL (ref 4–5.4)
WBC # BLD AUTO: 9.92 K/UL (ref 3.9–12.7)

## 2023-08-14 PROCEDURE — 82306 VITAMIN D 25 HYDROXY: CPT | Performed by: INTERNAL MEDICINE

## 2023-08-14 PROCEDURE — 85025 COMPLETE CBC W/AUTO DIFF WBC: CPT | Performed by: INTERNAL MEDICINE

## 2023-08-14 PROCEDURE — 36415 COLL VENOUS BLD VENIPUNCTURE: CPT | Performed by: INTERNAL MEDICINE

## 2023-08-14 NOTE — PROGRESS NOTES
Subjective:       Patient ID: Saima Winkler is a 53 y.o. female.    Chief Complaint: Arthritis, Follow-up, and Back Pain    Miss Saima Winkler ATILIO. is a  53-year-old  female who comes for 6 months interim follow-up.      Today she is here for arthritis and fibromyalgia.  Currently she is also on meloxicam.  She has been following up with rheumatologist in [place] and has been prescribed Lyrica which was very helpful for her.        Her underlying medical history is as below:-    1.-hot flashes and being currently on HRT.  Estradiol 1 mg  2.-chronic constipation and previously she was prescribed Linzess which did seem to help her.  Unfortunately it seem to cause him more diarrhea than benefit.  She has stopped taking it and she is currently stable on over-the-counter supplements and sometimes magnesium supplements which keeps her acceptably regular.  3.-status post right-sided total knee arthroplasty and gradual recovery now.  She is expected to be back to complete normal in another few months after completion of 1 year after the surgery.  4.-in past for some reason she had checked her cortisol levels from home kit and she found that the levels were low.  Perhaps some fatigue but nothing great.  No low blood pressures.  No fainting spells.  5.-sinus allergies and problems for which she takes cetirizine over-the-counter.  6. -diagnosis of fibromyalgia.  Dr. Brad Hung has recently relocated and retired.  She has established with Dr. Rusty Chandler.  She has been prescribed Lyrica and that has been apparently a game changer.  Her pain is much better at this point.    Social history indicates that she is  .  This is her 2nd marriage.      She has had at least 4 doses of COVID vaccine.  I have encouraged her to continue with COVID precautions and consider regular immunizations.    She works as a registered nurse at [employer].  She is a former smoker having quit in 2007.  Social  "alcohol use.  She has 3 children. 33, 28 22      Family history pertinent- father is  at the age of 65 and he had COPD and CHF.  Mother is spunky and alive at 82 and still working.        Coming back to issues of arthritis, she would like to continue with her meloxicam and she understands that long-term complications would include GI problems or kidney problems.  She also takes baclofen for back spasm.  Estradiol for hormone supplementation.  She understands risks of cardiac or vascular conditions with estradiol.    She continues to hurt in the shoulder regions.  She does recall that she used to be very hyper flexible in past and would demonstrate this to her friends.  Thus far she has no diagnosis of Mustapha-Danlos syndrome or hyperflexibility syndrome.  He does not recall anyone in family who had similar type of problem.    One of her sister has been diagnosed with psoriatic arthritis.  Patient herself does not have diagnosis of psoriasis.  As a part of rheumatological workup she had been checked for lupus, rheumatoid factors and they were all negative.            Past Medical History:   Diagnosis Date    Anxiety     Depression      Social History     Socioeconomic History    Marital status:      Spouse name: Stacy Garcia (Ken)    Number of children: 3   Occupational History    Occupation: Registered Nurse      Employer: [employer]     Comment: [employer]   Tobacco Use    Smoking status: Former     Current packs/day: 0.00     Types: Cigarettes     Quit date: 2007     Years since quittin.6    Smokeless tobacco: Never   Substance and Sexual Activity    Alcohol use: Yes    Drug use: No    Sexual activity: Yes     Partners: Male   Social History Narrative    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  She is a registered nurse.  She has 3 children age 33, 28and 22     Social Determinants of Health     Financial Resource Strain: Low Risk  (2023)    Overall " Financial Resource Strain (CARDIA)     Difficulty of Paying Living Expenses: Not very hard   Food Insecurity: No Food Insecurity (1/30/2023)    Hunger Vital Sign     Worried About Running Out of Food in the Last Year: Never true     Ran Out of Food in the Last Year: Never true   Transportation Needs: No Transportation Needs (1/30/2023)    PRAPARE - Transportation     Lack of Transportation (Medical): No     Lack of Transportation (Non-Medical): No   Physical Activity: Insufficiently Active (1/30/2023)    Exercise Vital Sign     Days of Exercise per Week: 5 days     Minutes of Exercise per Session: 20 min   Stress: Stress Concern Present (1/30/2023)    [place] [employer] - Occupational Stress Questionnaire     Feeling of Stress : To some extent   Social Connections: Socially Integrated (1/30/2023)    Social Connection and Isolation Panel [NHANES]     Frequency of Communication with Friends and Family: More than three times a week     Frequency of Social Gatherings with Friends and Family: Twice a week     Attends Rastafarian Services: More than 4 times per year     Active Member of Clubs or Organizations: Yes     Attends Club or Organization Meetings: 1 to 4 times per year     Marital Status:    Housing Stability: Low Risk  (1/30/2023)    Housing Stability Vital Sign     Unable to Pay for Housing in the Last Year: No     Number of Places Lived in the Last Year: 1     Unstable Housing in the Last Year: No     Past Surgical History:   Procedure Laterality Date    HYSTERECTOMY      KNEE SURGERY      ROBOTIC ARTHROPLASTY, KNEE Right 9/19/2022    Procedure: ROBOTIC ARTHROPLASTY, KNEE, TOTAL;  Surgeon: Eduar Whittington MD;  Location: [facility] OR;  Service: Orthopedics;  Laterality: Right;    SHOULDER SURGERY      TRANSFORAMINAL EPIDURAL INJECTION OF STEROID Bilateral 6/4/2019    Procedure: Injection,steroid,epidural,transforaminal approach L4-5;  Surgeon: Jeyson Kent MD;  Location: [facility];  Service:  Pain Management;  Laterality: Bilateral;    TRANSFORAMINAL EPIDURAL INJECTION OF STEROID Bilateral 7/25/2019    Procedure: Injection,steroid,epidural,transforaminal approach;  Surgeon: Jeyson Kent MD;  Location: [facility] OR;  Service: Pain Management;  Laterality: Bilateral;  L4-5    vaginal sling       Family History   Problem Relation Age of Onset    Asthma Mother     Depression Mother     Hypertension Mother     Heart disease Father     Depression Father     Hypertension Father        Review of Systems   Constitutional:  Positive for fatigue (Mild fatigue if at all.). Negative for activity change (Was initially limited because of knee pain.  Better now), chills, diaphoresis, fever and unexpected weight change.        2 X Covid infections in past    HENT:  Negative for congestion, hearing loss, rhinorrhea and trouble swallowing.    Eyes:  Negative for discharge, redness and visual disturbance.   Respiratory:  Negative for apnea, cough, chest tightness, wheezing and stridor.    Cardiovascular:  Negative for chest pain, palpitations and leg swelling.        One of the EKGs in past had read incomplete right bundle-branch block and repeat EKG done by her cardiologist did not read out through the computer the same type of finding.  Basically I reviewed the EKG and they look unchanged and I am not convinced about the significance of this finding.  If at all it is true.   Gastrointestinal:  Positive for constipation (Better with magnesium supplements and stool softeners). Negative for abdominal distention, abdominal pain, blood in stool, diarrhea and vomiting.        She takes magnesium supplements which do help.  Linzess apparently caused her dehydration at the dosage she was taking.  She stopped using it.   Endocrine: Negative for cold intolerance, polydipsia and polyuria.        Patient does get hot flashes and menopausal symptoms.  Currently on estradiol 1 mg and would consider weaning it off in future.  Mild fatigue.   "She had checked home kit for cortisol level which was apparently low.   Genitourinary:  Negative for difficulty urinating, dyspareunia, dysuria, hematuria and menstrual problem.   Musculoskeletal:  Positive for arthralgias. Negative for joint swelling and neck pain.        Chronic right knee pain and she had a total knee replacement.  After 4 months of postop discomfort she is finally seeing some recovery and relief.    She also has symptoms of fibromyalgia and is being treated with Lyrica and Celebrex which seems to help her.  She is following up with Dr. Rusty Mckenzie   Skin:  Negative for color change, rash and wound.        Again have been reviewed in past pictures in which her fingers look blanched and discolored suggestive of Raynaud's phenomenon.  Pictures of leg show some livido type of pattern.  Currently in office patient is doing okay.  The finger pictures were taken not December 23rd 2022 which was a ice freezing day.  She does state that her fingers get discolored whenever she puts them in the freezer also.   Neurological:  Negative for dizziness, seizures, syncope, weakness, numbness and headaches.        Intermittent headaches.   Psychiatric/Behavioral:  Negative for agitation, confusion and dysphoric mood. The patient is not nervous/anxious.         After her knee surgery and improvement in pain, stress and anxiety seems to be better.           Objective:      Blood pressure 137/87, pulse 80, height 5' 7" (1.702 m), weight 75.3 kg (166 lb). Body mass index is 26 kg/m².  Physical Exam  Constitutional:       General: She is not in acute distress.     Appearance: Normal appearance. She is not ill-appearing, toxic-appearing or diaphoretic.      Comments: BMI is 25.37   HENT:      Right Ear: Hearing, tympanic membrane and ear canal normal.      Left Ear: Hearing, tympanic membrane and ear canal normal.      Ears:      Comments: Patient's hearing is grossly intact to ordinary conversation and finger " snapping in the years.     Mouth/Throat:      Comments: Clicking felt in jaw.  Eyes:      General: No scleral icterus.     Comments: Patient's vision is grossly intact to 1 in letters and color vision.   Cardiovascular:      Rate and Rhythm: Normal rate and regular rhythm.      Pulses: Normal pulses.   Pulmonary:      Effort: No respiratory distress.      Breath sounds: No stridor.   Abdominal:      General: There is no distension.      Palpations: There is no mass.      Tenderness: There is no abdominal tenderness.   Musculoskeletal:         General: No swelling.      Right lower leg: No edema.      Left lower leg: No edema.        Legs:       Comments: Evidence of surgery on the right knee.  Range of motion complete.  There is no significant hyper flexibility demonstrated in most of the joints.  I could not pinch significant amount of skin.  She is able to hyper externally rotate her shoulder joints however.  There is some clicking.   Skin:     Coloration: Skin is not jaundiced or pale.      Findings: No lesion.      Comments: Mild skin discoloration changes.  Additional-she had gone to a tanning salon and the tan seems to be falling off at this point.   Neurological:      Mental Status: She is alert. Mental status is at baseline.      Cranial Nerves: No cranial nerve deficit.      Sensory: No sensory deficit.   Psychiatric:         Behavior: Behavior normal.      Comments: More euthymic today as compared to past.           Assessment:             Arthritis  -     meloxicam (MOBIC) 15 MG tablet; Take 1 tablet (15 mg total) by mouth once daily.  Dispense: 30 tablet; Refill: 5    Fibromyalgia  -     baclofen (LIORESAL) 10 MG tablet; Take 1 tablet (10 mg total) by mouth every evening.  Dispense: 30 tablet; Refill: 5    Status post total knee replacement, right  -     meloxicam (MOBIC) 15 MG tablet; Take 1 tablet (15 mg total) by mouth once daily.  Dispense: 30 tablet; Refill: 5    Hot flashes due to  menopause  Comments:  Patient is currently continuing on estradiol 1 mg. She may be slowly planning to wean off the medication in future.  Orders:  -     estradioL (ESTRACE) 1 MG tablet; Take 1 tablet (1 mg total) by mouth once daily.  Dispense: 90 tablet; Refill: 1    Muscle spasm  -     baclofen (LIORESAL) 10 MG tablet; Take 1 tablet (10 mg total) by mouth every evening.  Dispense: 30 tablet; Refill: 5    Abnormal EKG  Comments:  Abnormal EKG was noted in past with incomplete right bundle-branch block which was not read by the computer in repeat EKG.  Probably not much consequence.       Test Reason : R06.09,     Vent. Rate : 063 BPM     Atrial Rate : 063 BPM      P-R Int : 170 ms          QRS Dur : 102 ms       QT Int : 444 ms       P-R-T Axes : 065 050 072 degrees      QTc Int : 454 ms     Normal sinus rhythm   Normal ECG   When compared with ECG of 07-SEP-2022 12:13,   No significant change was found   Confirmed by Esdras Glass MD ([id number]) on 5/26/2023 2:23:51 PM     Referred By: [email or web address]    SELF           Confirmed By:Esdras Glass MD      Specimen Collected: 05/25/23 16:02 Last Resulted: 05/26/23 14:23        Lab Visit on 08/14/2023   Component Date Value Ref Range Status    Vit D, 25-Hydroxy 08/14/2023 44  30 - 96 ng/mL Final    WBC 08/14/2023 9.92  3.90 - 12.70 K/uL Final    RBC 08/14/2023 4.14  4.00 - 5.40 M/uL Final    Hemoglobin 08/14/2023 13.0  12.0 - 16.0 g/dL Final    Hematocrit 08/14/2023 38.5  37.0 - 48.5 % Final    MCV 08/14/2023 93  82 - 98 fL Final    MCH 08/14/2023 31.4 (H)  27.0 - 31.0 pg Final    MCHC 08/14/2023 33.8  32.0 - 36.0 g/dL Final    RDW 08/14/2023 12.4  11.5 - 14.5 % Final    Platelets 08/14/2023 282  150 - 450 K/uL Final    MPV 08/14/2023 8.5 (L)  9.2 - 12.9 fL Final    Immature Granulocytes 08/14/2023 0.8 (H)  0.0 - 0.5 % Final    Gran # (ANC) 08/14/2023 5.6  1.8 - 7.7 K/uL Final    Immature Grans (Abs) 08/14/2023 0.08 (H)  0.00 - 0.04 K/uL Final    Lymph # 08/14/2023 3.3  1.0 - 4.8 K/uL  Final    Mono # 08/14/2023 0.7  0.3 - 1.0 K/uL Final    Eos # 08/14/2023 0.2  0.0 - 0.5 K/uL Final    Baso # 08/14/2023 0.08  0.00 - 0.20 K/uL Final    nRBC 08/14/2023 0  0 /100 WBC Final    Gran % 08/14/2023 56.6  38.0 - 73.0 % Final    Lymph % 08/14/2023 32.8  18.0 - 48.0 % Final    Mono % 08/14/2023 7.2  4.0 - 15.0 % Final    Eosinophil % 08/14/2023 1.8  0.0 - 8.0 % Final    Basophil % 08/14/2023 0.8  0.0 - 1.9 % Final    Differential Method 08/14/2023 Automated   Final   Office Visit on 08/09/2023   Component Date Value Ref Range Status    SARS Coronavirus 2 Antigen 08/09/2023 Negative  Negative Final     Acceptable 08/09/2023 Yes   Final    Molecular Strep A, POC 08/09/2023 Negative  Negative Final     Acceptable 08/09/2023 Yes   Final         Plan:           Arthritis  -     meloxicam (MOBIC) 15 MG tablet; Take 1 tablet (15 mg total) by mouth once daily.  Dispense: 30 tablet; Refill: 5    Fibromyalgia  -     baclofen (LIORESAL) 10 MG tablet; Take 1 tablet (10 mg total) by mouth every evening.  Dispense: 30 tablet; Refill: 5    Status post total knee replacement, right  -     meloxicam (MOBIC) 15 MG tablet; Take 1 tablet (15 mg total) by mouth once daily.  Dispense: 30 tablet; Refill: 5    Hot flashes due to menopause  Comments:  Patient is currently continuing on estradiol 1 mg. She may be slowly planning to wean off the medication in future.  Orders:  -     estradioL (ESTRACE) 1 MG tablet; Take 1 tablet (1 mg total) by mouth once daily.  Dispense: 90 tablet; Refill: 1    Muscle spasm  -     baclofen (LIORESAL) 10 MG tablet; Take 1 tablet (10 mg total) by mouth every evening.  Dispense: 30 tablet; Refill: 5    Abnormal EKG  Comments:  Abnormal EKG was noted in past with incomplete right bundle-branch block which was not read by the computer in repeat EKG.  Probably not much consequence.        Ms. Landaverde had the surgery for the right knee.  She is gradually recuperating and  recovering with regular activity and in 1 year time it is expected that she will back to her normal activity.      Shoulder and other joint pains do still bother her and afflict her.  She has been worked up extensively for rheumatological causes including screening for rheumatoid arthritis and lupus and most of the tests have been negative.  At this point she has been generally considered to have fibromyalgia and is being treated accordingly.    Overall Ms. Landaverde seems to be doing okay with the work in life in situation.    Long-term use of meloxicam as an anti-inflammatory medication which gives her relief has been discussed again.  He does take Lyrica also and I am not sure if a combination of this helps her or individually 1 of the component might be helping her more.    Long-term effects of meloxicam could affect kidneys and GI tract.    There was a consideration for other causes of arthritis slight Mustapha-Danlos syndrome because of some mention about hyper flexibility but I am not convinced.  This is a genetic test and may merit further consideration in future if symptoms or findings point out towards it.      Preventive care issues discussed.      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      Follow-up in 6 months time for annual physical.    Preventive care issues of tetanus vaccination, colorectal screening are done.  He is also updated on mammogram.    Spent jamie 30 minutes with patient which involved review of pts medical conditions, labs, medications and with 50% of time face-to-face discussion about medical problems, management and any applicable changes.    Current Outpatient Medications:     calcium carbonate/vitamin D3 (CALTRATE 600 + D ORAL), , Disp: , Rfl:     cetirizine (ZYRTEC) 10 MG tablet, Take 10 mg by mouth once daily., Disp: , Rfl:     magnesium hydroxide 400 mg/5 ml (MILK OF MAGNESIA) 400 mg/5 mL Susp, , Disp: , Rfl:     pregabalin (LYRICA) 50 MG capsule, Take 50 mg by mouth  3 (three) times daily., Disp: , Rfl:     baclofen (LIORESAL) 10 MG tablet, Take 1 tablet (10 mg total) by mouth every evening., Disp: 30 tablet, Rfl: 5    estradioL (ESTRACE) 1 MG tablet, Take 1 tablet (1 mg total) by mouth once daily., Disp: 90 tablet, Rfl: 1    meloxicam (MOBIC) 15 MG tablet, Take 1 tablet (15 mg total) by mouth once daily., Disp: 30 tablet, Rfl: 5

## 2023-08-14 NOTE — TELEPHONE ENCOUNTER
----- Message from Gerardo Buchanan MD sent at 8/14/2023  3:50 PM CDT -----  The results are within acceptable range.  Please keep regular follow up.

## 2023-08-15 ENCOUNTER — PATIENT MESSAGE (OUTPATIENT)
Dept: FAMILY MEDICINE | Facility: CLINIC | Age: 53
End: 2023-08-15

## 2023-08-15 ENCOUNTER — OFFICE VISIT (OUTPATIENT)
Dept: FAMILY MEDICINE | Facility: CLINIC | Age: 53
End: 2023-08-15
Payer: COMMERCIAL

## 2023-08-15 VITALS
BODY MASS INDEX: 26.06 KG/M2 | HEIGHT: 67 IN | WEIGHT: 166 LBS | HEART RATE: 80 BPM | SYSTOLIC BLOOD PRESSURE: 137 MMHG | DIASTOLIC BLOOD PRESSURE: 87 MMHG

## 2023-08-15 DIAGNOSIS — M62.838 MUSCLE SPASM: ICD-10-CM

## 2023-08-15 DIAGNOSIS — R94.31 ABNORMAL EKG: ICD-10-CM

## 2023-08-15 DIAGNOSIS — N95.1 HOT FLASHES DUE TO MENOPAUSE: Chronic | ICD-10-CM

## 2023-08-15 DIAGNOSIS — M79.7 FIBROMYALGIA: ICD-10-CM

## 2023-08-15 DIAGNOSIS — M19.90 ARTHRITIS: Primary | ICD-10-CM

## 2023-08-15 DIAGNOSIS — Z96.651 STATUS POST TOTAL KNEE REPLACEMENT, RIGHT: ICD-10-CM

## 2023-08-15 PROCEDURE — 1160F RVW MEDS BY RX/DR IN RCRD: CPT | Mod: CPTII,S$GLB,, | Performed by: INTERNAL MEDICINE

## 2023-08-15 PROCEDURE — 1160F PR REVIEW ALL MEDS BY PRESCRIBER/CLIN PHARMACIST DOCUMENTED: ICD-10-PCS | Mod: CPTII,S$GLB,, | Performed by: INTERNAL MEDICINE

## 2023-08-15 PROCEDURE — 3075F PR MOST RECENT SYSTOLIC BLOOD PRESS GE 130-139MM HG: ICD-10-PCS | Mod: CPTII,S$GLB,, | Performed by: INTERNAL MEDICINE

## 2023-08-15 PROCEDURE — 3075F SYST BP GE 130 - 139MM HG: CPT | Mod: CPTII,S$GLB,, | Performed by: INTERNAL MEDICINE

## 2023-08-15 PROCEDURE — 99214 PR OFFICE/OUTPT VISIT, EST, LEVL IV, 30-39 MIN: ICD-10-PCS | Mod: S$GLB,,, | Performed by: INTERNAL MEDICINE

## 2023-08-15 PROCEDURE — 3079F PR MOST RECENT DIASTOLIC BLOOD PRESSURE 80-89 MM HG: ICD-10-PCS | Mod: CPTII,S$GLB,, | Performed by: INTERNAL MEDICINE

## 2023-08-15 PROCEDURE — 3008F PR BODY MASS INDEX (BMI) DOCUMENTED: ICD-10-PCS | Mod: CPTII,S$GLB,, | Performed by: INTERNAL MEDICINE

## 2023-08-15 PROCEDURE — 3079F DIAST BP 80-89 MM HG: CPT | Mod: CPTII,S$GLB,, | Performed by: INTERNAL MEDICINE

## 2023-08-15 PROCEDURE — 3008F BODY MASS INDEX DOCD: CPT | Mod: CPTII,S$GLB,, | Performed by: INTERNAL MEDICINE

## 2023-08-15 PROCEDURE — 3044F HG A1C LEVEL LT 7.0%: CPT | Mod: CPTII,S$GLB,, | Performed by: INTERNAL MEDICINE

## 2023-08-15 PROCEDURE — 1159F MED LIST DOCD IN RCRD: CPT | Mod: CPTII,S$GLB,, | Performed by: INTERNAL MEDICINE

## 2023-08-15 PROCEDURE — 1159F PR MEDICATION LIST DOCUMENTED IN MEDICAL RECORD: ICD-10-PCS | Mod: CPTII,S$GLB,, | Performed by: INTERNAL MEDICINE

## 2023-08-15 PROCEDURE — 3044F PR MOST RECENT HEMOGLOBIN A1C LEVEL <7.0%: ICD-10-PCS | Mod: CPTII,S$GLB,, | Performed by: INTERNAL MEDICINE

## 2023-08-15 PROCEDURE — 99214 OFFICE O/P EST MOD 30 MIN: CPT | Mod: S$GLB,,, | Performed by: INTERNAL MEDICINE

## 2023-08-15 RX ORDER — ESTRADIOL 1 MG/1
1 TABLET ORAL DAILY
Qty: 90 TABLET | Refills: 1 | Status: SHIPPED | OUTPATIENT
Start: 2023-08-15 | End: 2024-03-22

## 2023-08-15 RX ORDER — BACLOFEN 10 MG/1
10 TABLET ORAL NIGHTLY
Qty: 30 TABLET | Refills: 5 | Status: SHIPPED | OUTPATIENT
Start: 2023-08-15 | End: 2024-03-28

## 2023-08-15 RX ORDER — MELOXICAM 15 MG/1
15 TABLET ORAL DAILY
Qty: 30 TABLET | Refills: 5 | Status: SHIPPED | OUTPATIENT
Start: 2023-08-15 | End: 2024-03-28

## 2023-09-18 ENCOUNTER — HOSPITAL ENCOUNTER (EMERGENCY)
Facility: HOSPITAL | Age: 53
Discharge: HOME | End: 2023-09-18
Attending: EMERGENCY MEDICINE
Payer: COMMERCIAL

## 2023-09-18 DIAGNOSIS — R05.9 COUGH: ICD-10-CM

## 2023-09-18 DIAGNOSIS — R07.89 CHEST TIGHTNESS: ICD-10-CM

## 2023-09-18 DIAGNOSIS — B34.9 ACUTE VIRAL SYNDROME: Primary | ICD-10-CM

## 2023-09-18 LAB
ALBUMIN SERPL BCP-MCNC: 3.8 G/DL (ref 3.5–5.2)
ALP SERPL-CCNC: 51 U/L (ref 55–135)
ALT SERPL W/O P-5'-P-CCNC: 14 U/L (ref 10–44)
ANION GAP SERPL CALC-SCNC: 10 MMOL/L (ref 8–16)
AST SERPL-CCNC: 15 U/L (ref 10–40)
BASOPHILS # BLD AUTO: 0.04 K/UL (ref 0–0.2)
BASOPHILS NFR BLD: 0.3 % (ref 0–1.9)
BILIRUB SERPL-MCNC: 0.6 MG/DL (ref 0.1–1)
BILIRUB UR QL STRIP: NEGATIVE
BUN SERPL-MCNC: 11 MG/DL (ref 6–20)
CALCIUM SERPL-MCNC: 9.1 MG/DL (ref 8.7–10.5)
CHLORIDE SERPL-SCNC: 99 MMOL/L (ref 95–110)
CLARITY UR: CLEAR
CO2 SERPL-SCNC: 25 MMOL/L (ref 23–29)
COLOR UR: YELLOW
CREAT SERPL-MCNC: 0.8 MG/DL (ref 0.5–1.4)
D DIMER PPP IA.FEU-MCNC: 0.3 MG/L FEU
DIFFERENTIAL METHOD: ABNORMAL
EOSINOPHIL # BLD AUTO: 0 K/UL (ref 0–0.5)
EOSINOPHIL NFR BLD: 0.3 % (ref 0–8)
ERYTHROCYTE [DISTWIDTH] IN BLOOD BY AUTOMATED COUNT: 11.8 % (ref 11.5–14.5)
EST. GFR  (NO RACE VARIABLE): >60 ML/MIN/1.73 M^2
GLUCOSE SERPL-MCNC: 125 MG/DL (ref 70–110)
GLUCOSE UR QL STRIP: NEGATIVE
GROUP A STREP, MOLECULAR: NEGATIVE
HCT VFR BLD AUTO: 34.9 % (ref 37–48.5)
HGB BLD-MCNC: 12.3 G/DL (ref 12–16)
HGB UR QL STRIP: NEGATIVE
IMM GRANULOCYTES # BLD AUTO: 0.05 K/UL (ref 0–0.04)
IMM GRANULOCYTES NFR BLD AUTO: 0.4 % (ref 0–0.5)
INFLUENZA A, MOLECULAR: NEGATIVE
INFLUENZA B, MOLECULAR: NEGATIVE
KETONES UR QL STRIP: NEGATIVE
LACTATE SERPL-SCNC: 1.2 MMOL/L (ref 0.5–2.2)
LEUKOCYTE ESTERASE UR QL STRIP: NEGATIVE
LYMPHOCYTES # BLD AUTO: 1.3 K/UL (ref 1–4.8)
LYMPHOCYTES NFR BLD: 9.9 % (ref 18–48)
MCH RBC QN AUTO: 31.9 PG (ref 27–31)
MCHC RBC AUTO-ENTMCNC: 35.2 G/DL (ref 32–36)
MCV RBC AUTO: 90 FL (ref 82–98)
MONOCYTES # BLD AUTO: 0.8 K/UL (ref 0.3–1)
MONOCYTES NFR BLD: 6.3 % (ref 4–15)
NEUTROPHILS # BLD AUTO: 10.7 K/UL (ref 1.8–7.7)
NEUTROPHILS NFR BLD: 82.8 % (ref 38–73)
NITRITE UR QL STRIP: NEGATIVE
NRBC BLD-RTO: 0 /100 WBC
PH UR STRIP: 8 [PH] (ref 5–8)
PLATELET # BLD AUTO: 178 K/UL (ref 150–450)
PMV BLD AUTO: 9.1 FL (ref 9.2–12.9)
POTASSIUM SERPL-SCNC: 4 MMOL/L (ref 3.5–5.1)
PROT SERPL-MCNC: 6.7 G/DL (ref 6–8.4)
PROT UR QL STRIP: NEGATIVE
RBC # BLD AUTO: 3.86 M/UL (ref 4–5.4)
SARS-COV-2 RDRP RESP QL NAA+PROBE: NEGATIVE
SODIUM SERPL-SCNC: 134 MMOL/L (ref 136–145)
SP GR UR STRIP: 1.01 (ref 1–1.03)
SPECIMEN SOURCE: NORMAL
URN SPEC COLLECT METH UR: NORMAL
UROBILINOGEN UR STRIP-ACNC: NEGATIVE EU/DL
WBC # BLD AUTO: 12.92 K/UL (ref 3.9–12.7)

## 2023-09-18 PROCEDURE — 87651 STREP A DNA AMP PROBE: CPT | Performed by: NURSE PRACTITIONER

## 2023-09-18 PROCEDURE — 93010 EKG 12-LEAD: ICD-10-PCS | Mod: ,,, | Performed by: INTERNAL MEDICINE

## 2023-09-18 PROCEDURE — 87502 INFLUENZA DNA AMP PROBE: CPT | Performed by: NURSE PRACTITIONER

## 2023-09-18 PROCEDURE — 85025 COMPLETE CBC W/AUTO DIFF WBC: CPT | Performed by: EMERGENCY MEDICINE

## 2023-09-18 PROCEDURE — 87040 BLOOD CULTURE FOR BACTERIA: CPT | Mod: 59 | Performed by: EMERGENCY MEDICINE

## 2023-09-18 PROCEDURE — 80053 COMPREHEN METABOLIC PANEL: CPT | Performed by: EMERGENCY MEDICINE

## 2023-09-18 PROCEDURE — 93010 ELECTROCARDIOGRAM REPORT: CPT | Mod: ,,, | Performed by: INTERNAL MEDICINE

## 2023-09-18 PROCEDURE — 83605 ASSAY OF LACTIC ACID: CPT | Performed by: EMERGENCY MEDICINE

## 2023-09-18 PROCEDURE — 99285 EMERGENCY DEPT VISIT HI MDM: CPT | Mod: 25

## 2023-09-18 PROCEDURE — U0002 COVID-19 LAB TEST NON-CDC: HCPCS | Performed by: NURSE PRACTITIONER

## 2023-09-18 PROCEDURE — 25000003 PHARM REV CODE 250: Performed by: EMERGENCY MEDICINE

## 2023-09-18 PROCEDURE — 93005 ELECTROCARDIOGRAM TRACING: CPT

## 2023-09-18 PROCEDURE — 71046 X-RAY EXAM CHEST 2 VIEWS: CPT | Mod: TC

## 2023-09-18 PROCEDURE — 36415 COLL VENOUS BLD VENIPUNCTURE: CPT | Performed by: EMERGENCY MEDICINE

## 2023-09-18 PROCEDURE — 81003 URINALYSIS AUTO W/O SCOPE: CPT | Performed by: EMERGENCY MEDICINE

## 2023-09-18 PROCEDURE — 85379 FIBRIN DEGRADATION QUANT: CPT | Performed by: EMERGENCY MEDICINE

## 2023-09-18 PROCEDURE — 96360 HYDRATION IV INFUSION INIT: CPT

## 2023-09-18 PROCEDURE — 71046 X-RAY EXAM CHEST 2 VIEWS: CPT | Mod: 26,,, | Performed by: RADIOLOGY

## 2023-09-18 PROCEDURE — 71046 XR CHEST PA AND LATERAL: ICD-10-PCS | Mod: 26,,, | Performed by: RADIOLOGY

## 2023-09-18 RX ORDER — IBUPROFEN 400 MG/1
800 TABLET ORAL
Status: COMPLETED | OUTPATIENT
Start: 2023-09-18 | End: 2023-09-18

## 2023-09-18 RX ORDER — ACETAMINOPHEN 500 MG
1000 TABLET ORAL
Status: COMPLETED | OUTPATIENT
Start: 2023-09-18 | End: 2023-09-18

## 2023-09-18 RX ADMIN — SODIUM CHLORIDE 1000 ML: 9 INJECTION, SOLUTION INTRAVENOUS at 09:09

## 2023-09-18 RX ADMIN — ACETAMINOPHEN 1000 MG: 500 TABLET ORAL at 11:09

## 2023-09-18 RX ADMIN — IBUPROFEN 800 MG: 400 TABLET, FILM COATED ORAL at 11:09

## 2023-09-18 NOTE — Clinical Note
"Saima"Alexandro Winkler was seen and treated in our emergency department on 9/18/2023.  She may return to work on 09/25/2023.       If you have any questions or concerns, please don't hesitate to call.      Marciano Lomxa Jr., MD"

## 2023-09-19 ENCOUNTER — OFFICE VISIT (OUTPATIENT)
Dept: URGENT CARE | Facility: CLINIC | Age: 53
End: 2023-09-19
Payer: COMMERCIAL

## 2023-09-19 ENCOUNTER — PATIENT MESSAGE (OUTPATIENT)
Dept: FAMILY MEDICINE | Facility: CLINIC | Age: 53
End: 2023-09-19

## 2023-09-19 ENCOUNTER — PATIENT OUTREACH (OUTPATIENT)
Dept: ADMINISTRATIVE | Facility: HOSPITAL | Age: 53
End: 2023-09-19
Payer: COMMERCIAL

## 2023-09-19 ENCOUNTER — PATIENT MESSAGE (OUTPATIENT)
Dept: CARDIOLOGY | Facility: CLINIC | Age: 53
End: 2023-09-19
Payer: COMMERCIAL

## 2023-09-19 VITALS
RESPIRATION RATE: 17 BRPM | TEMPERATURE: 100 F | WEIGHT: 166 LBS | BODY MASS INDEX: 26.06 KG/M2 | DIASTOLIC BLOOD PRESSURE: 78 MMHG | HEART RATE: 97 BPM | HEIGHT: 67 IN | OXYGEN SATURATION: 96 % | SYSTOLIC BLOOD PRESSURE: 129 MMHG

## 2023-09-19 VITALS
SYSTOLIC BLOOD PRESSURE: 155 MMHG | HEART RATE: 115 BPM | BODY MASS INDEX: 25.9 KG/M2 | WEIGHT: 165 LBS | TEMPERATURE: 101 F | DIASTOLIC BLOOD PRESSURE: 92 MMHG | HEIGHT: 67 IN | OXYGEN SATURATION: 98 %

## 2023-09-19 DIAGNOSIS — J40 BRONCHITIS: Primary | ICD-10-CM

## 2023-09-19 DIAGNOSIS — R05.1 ACUTE COUGH: ICD-10-CM

## 2023-09-19 LAB
CTP QC/QA: YES
HETEROPH AB SER QL: NEGATIVE
MOLECULAR STREP A: NEGATIVE
POC MOLECULAR INFLUENZA A AGN: NEGATIVE
POC MOLECULAR INFLUENZA B AGN: NEGATIVE
RSV RAPID ANTIGEN: NEGATIVE
SARS-COV-2 AG RESP QL IA.RAPID: NEGATIVE

## 2023-09-19 PROCEDURE — 86308 POCT INFECTIOUS MONONUCLEOSIS: ICD-10-PCS | Mod: QW,,, | Performed by: NURSE PRACTITIONER

## 2023-09-19 PROCEDURE — 87811 SARS-COV-2 COVID19 W/OPTIC: CPT | Mod: QW,S$GLB,, | Performed by: NURSE PRACTITIONER

## 2023-09-19 PROCEDURE — 99213 PR OFFICE/OUTPT VISIT, EST, LEVL III, 20-29 MIN: ICD-10-PCS | Mod: S$GLB,,, | Performed by: NURSE PRACTITIONER

## 2023-09-19 PROCEDURE — 87807 RSV ASSAY W/OPTIC: CPT | Mod: QW,,, | Performed by: NURSE PRACTITIONER

## 2023-09-19 PROCEDURE — 87651 STREP A DNA AMP PROBE: CPT | Mod: QW,,, | Performed by: NURSE PRACTITIONER

## 2023-09-19 PROCEDURE — 87502 INFLUENZA DNA AMP PROBE: CPT | Mod: QW,,, | Performed by: NURSE PRACTITIONER

## 2023-09-19 PROCEDURE — 86308 HETEROPHILE ANTIBODY SCREEN: CPT | Mod: QW,,, | Performed by: NURSE PRACTITIONER

## 2023-09-19 PROCEDURE — 87502 POCT INFLUENZA A/B MOLECULAR: ICD-10-PCS | Mod: QW,,, | Performed by: NURSE PRACTITIONER

## 2023-09-19 PROCEDURE — 87651 POCT STREP A MOLECULAR: ICD-10-PCS | Mod: QW,,, | Performed by: NURSE PRACTITIONER

## 2023-09-19 PROCEDURE — 99213 OFFICE O/P EST LOW 20 MIN: CPT | Mod: S$GLB,,, | Performed by: NURSE PRACTITIONER

## 2023-09-19 PROCEDURE — 87811 SARS CORONAVIRUS 2 ANTIGEN POCT, MANUAL READ: ICD-10-PCS | Mod: QW,S$GLB,, | Performed by: NURSE PRACTITIONER

## 2023-09-19 PROCEDURE — 87807 POCT RESPIRATORY SYNCYTIAL VIRUS: ICD-10-PCS | Mod: QW,,, | Performed by: NURSE PRACTITIONER

## 2023-09-19 RX ORDER — PREDNISONE 10 MG/1
10 TABLET ORAL DAILY
Qty: 4 TABLET | Refills: 0 | Status: SHIPPED | OUTPATIENT
Start: 2023-09-19 | End: 2023-09-23

## 2023-09-19 RX ORDER — PROMETHAZINE HYDROCHLORIDE AND DEXTROMETHORPHAN HYDROBROMIDE 6.25; 15 MG/5ML; MG/5ML
5 SYRUP ORAL EVERY 4 HOURS PRN
Qty: 118 ML | Refills: 0 | Status: SHIPPED | OUTPATIENT
Start: 2023-09-19 | End: 2023-09-29

## 2023-09-19 RX ORDER — AMOXICILLIN 500 MG/1
500 CAPSULE ORAL EVERY 8 HOURS
Qty: 30 CAPSULE | Refills: 0 | Status: SHIPPED | OUTPATIENT
Start: 2023-09-19 | End: 2023-09-29

## 2023-09-19 RX ORDER — ALBUTEROL SULFATE 90 UG/1
2 AEROSOL, METERED RESPIRATORY (INHALATION) EVERY 6 HOURS PRN
Qty: 1 G | Refills: 0 | Status: SHIPPED | OUTPATIENT
Start: 2023-09-19 | End: 2024-03-28 | Stop reason: SDUPTHER

## 2023-09-19 NOTE — ED PROVIDER NOTES
Encounter Date: 2023       History     Chief Complaint   Patient presents with    COVID-19 Concerns     Pt. C/o cough/congestion, fever, and body aches since Saturday. Temp is 103.1 in triage.     Pov to ED with family. Patient complains of coughing, congestion, body aches, fever, chills onset yesterday. Worse today. 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 Denies tobacco use. Denies chest pain or shortness of breath. Denies abdomen pain, denies N/V/D. No other complaints. She had one gram of tylenol at 1545 and aleve at 1630.    The history is provided by the patient.     Review of patient's allergies indicates:  No Known Allergies  Past Medical History:   Diagnosis Date    Anxiety     Depression      Past Surgical History:   Procedure Laterality Date    HYSTERECTOMY      KNEE SURGERY      ROBOTIC ARTHROPLASTY, KNEE Right 2022    Procedure: ROBOTIC ARTHROPLASTY, KNEE, TOTAL;  Surgeon: Eduar Whittington MD;  Location: [facility] OR;  Service: Orthopedics;  Laterality: Right;    SHOULDER SURGERY      TRANSFORAMINAL EPIDURAL INJECTION OF STEROID Bilateral 2019    Procedure: Injection,steroid,epidural,transforaminal approach L4-5;  Surgeon: Jeyson Kent MD;  Location: [facility] OR;  Service: Pain Management;  Laterality: Bilateral;    TRANSFORAMINAL EPIDURAL INJECTION OF STEROID Bilateral 2019    Procedure: Injection,steroid,epidural,transforaminal approach;  Surgeon: Jeyson Kent MD;  Location: [facility] OR;  Service: Pain Management;  Laterality: Bilateral;  L4-5    vaginal sling       Family History   Problem Relation Age of Onset    Asthma Mother     Depression Mother     Hypertension Mother     Heart disease Father     Depression Father     Hypertension Father      Social History     Tobacco Use    Smoking status: Former     Current packs/day: 0.00     Types: Cigarettes     Quit date: 2007     Years since quittin.7    Smokeless tobacco: Never   Substance Use Topics    Alcohol use: Yes     Drug use: No     Review of Systems   Constitutional:  Positive for chills, fatigue and fever. Negative for appetite change.   HENT:  Negative for ear pain and sore throat.    Respiratory:  Positive for cough. Negative for shortness of breath.    Cardiovascular:  Negative for chest pain.   Gastrointestinal:  Negative for abdominal pain, diarrhea, nausea and vomiting.   Musculoskeletal:  Positive for myalgias.   All other systems reviewed and are negative.      Physical Exam     Initial Vitals [09/18/23 1935]   BP Pulse Resp Temp SpO2   (!) 167/110 (!) 133 20 (!) 103.1 °F (39.5 °C) 97 %      MAP       --         Physical Exam    Nursing note and vitals reviewed.  Constitutional: She appears well-developed and well-nourished. No distress.   HENT:   Head: Normocephalic and atraumatic.   Mouth/Throat: Oropharynx is clear and moist.   Eyes: EOM are normal. Pupils are equal, round, and reactive to light.   Neck:   Normal range of motion.  Cardiovascular:  Regular rhythm.           Tachycardia   Pulmonary/Chest: Breath sounds normal. No respiratory distress.   Abdominal: Abdomen is soft. There is no abdominal tenderness.   Musculoskeletal:         General: Normal range of motion.      Cervical back: Normal range of motion.     Neurological: She is alert and oriented to person, place, and time. She has normal strength. GCS score is 15. GCS eye subscore is 4. GCS verbal subscore is 5. GCS motor subscore is 6.   Skin: Capillary refill takes 2 to 3 seconds.   Hot, dry   Psychiatric: She has a normal mood and affect. Thought content normal.         ED Course   Procedures  Labs Reviewed   CBC W/ AUTO DIFFERENTIAL - Abnormal; Notable for the following components:       Result Value    WBC 12.92 (*)     RBC 3.86 (*)     Hematocrit 34.9 (*)     MCH 31.9 (*)     MPV 9.1 (*)     Gran # (ANC) 10.7 (*)     Immature Grans (Abs) 0.05 (*)     Gran % 82.8 (*)     Lymph % 9.9 (*)     All other components within normal limits    COMPREHENSIVE METABOLIC PANEL - Abnormal; Notable for the following components:    Sodium 134 (*)     Glucose 125 (*)     Alkaline Phosphatase 51 (*)     All other components within normal limits   INFLUENZA A & B BY MOLECULAR   GROUP A STREP, MOLECULAR   CULTURE, BLOOD   CULTURE, BLOOD   SARS-COV-2 RNA AMPLIFICATION, QUAL    Narrative:     Is the patient symptomatic?->Yes   LACTIC ACID, PLASMA   URINALYSIS, REFLEX TO URINE CULTURE    Narrative:     Preferred Collection Type->Urine, Clean Catch  Specimen Source->Urine   D DIMER, QUANTITATIVE   D DIMER, QUANTITATIVE     EKG Readings: (Independently Interpreted)   Rhythm: Sinus Tachycardia. Heart Rate: 108. Ectopy: No Ectopy. Conduction: Normal. ST Segments: Normal ST Segments. T Waves: Normal. Axis: Left Axis Deviation. Clinical Impression: Sinus Tachycardia Other Impression: iRBBB       Imaging Results              X-Ray Chest PA And Lateral (Final result)  Result time 09/18/23 21:24:05      Final result by Aden Jerez MD (09/18/23 21:24:05)                   Impression:      No focal consolidation.      Electronically signed by: Aden Jerez  Date:    09/18/2023  Time:    21:24               Narrative:    EXAMINATION:  XR CHEST PA AND LATERAL    CLINICAL HISTORY:  Cough, unspecified    TECHNIQUE:  PA and lateral views of the chest were performed.    COMPARISON:  09/07/2022    FINDINGS:  Lungs are clear.  No focal consolidation, pleural fluid, or pneumothorax.  Normal heart size.                                    X-Rays:   Independently Interpreted Readings:   Other Readings:  EXAMINATION:  XR CHEST PA AND LATERAL     CLINICAL HISTORY:  Cough, unspecified     TECHNIQUE:  PA and lateral views of the chest were performed.     COMPARISON:  09/07/2022     FINDINGS:  Lungs are clear.  No focal consolidation, pleural fluid, or pneumothorax.  Normal heart size.     Impression:     No focal consolidation.    Medications   acetaminophen tablet 1,000 mg  (has no administration in time range)   ibuprofen tablet 800 mg (has no administration in time range)   sodium chloride 0.9% bolus 1,000 mL 1,000 mL (1,000 mLs Intravenous New Bag 9/18/23 2128)     Medical Decision Making  Negative covid, flu, strep. No acute consolidation on chest x-ray. End of shift report to Dr Lomax. Discussed with Dr Lomax    Pt presented with fever, chills, gen weakness, myalgias and cough. Benign exam. Seen initially by NP and I took over due to not improving with conservative treatment. CBC showed mild leukocytosis. Blood Cxs and lactate sent. Lactate was neg. CMP unremarkable. She did have some tightness in her chest and she was mildly tachy so Ddimer sent and was neg. CXR neg. EKG normal other than rate. UA neg. Swabs ordered by NP were neg. Pt took tylenol and motrin for fever and was given 1L NS for volume depletion and she felt back to baseline. False neg Covid swab suspected. Pt advised to f/u with her PCP in the next week for recheck.     Amount and/or Complexity of Data Reviewed  Labs: ordered. Decision-making details documented in ED Course.  Radiology: ordered. Decision-making details documented in ED Course.  ECG/medicine tests: ordered and independent interpretation performed. Decision-making details documented in ED Course.               ED Course as of 09/18/23 2301   Mon Sep 18, 2023   2154 Pt reported some chest pain, EKG performed. Pain resolved when she removed her bra.  [DC]      ED Course User Index  [DC] Marciano Lomax Jr., MD                    Clinical Impression:   Final diagnoses:  [R05.9] Cough  [R07.89] Chest tightness  [B34.9] Acute viral syndrome (Primary)        ED Disposition Condition    Discharge Stable          ED Prescriptions    None       Follow-up Information       Follow up With Specialties Details Why Contact Info    Gerardo Buchanan MD Internal Medicine Schedule an appointment as soon as possible for a visit   [address]  [address]  [place]  [address]  [phone]               Marciano Lomax Jr., MD  09/18/23 4623

## 2023-09-19 NOTE — PROGRESS NOTES
"Subjective:       Patient ID: Saima Winkler is a 53 y.o. female.    Vitals:  height is 5' 7" (1.702 m) and weight is 74.8 kg (165 lb). Her oral temperature is 100.8 °F (38.2 °C) (abnormal). Her blood pressure is 155/92 (abnormal) and her pulse is 115 (abnormal). Her oxygen saturation is 98%.     Chief Complaint: Fever (X 3 days with fever, fever 101.9, up to 104, went to [facility].  Did covid, flu, strep, all negative.  Did feel better after fluids. )    This is a 53 y.o. female who presents today with a chief complaint of  X 3 days with fever, fever 101.9, up to 104, went to [facility].  Did covid, flu, strep, all negative.  Did feel better after fluids.  Told to follow up is still running a fever.  She is now 100.8, she took 1 gram of tylenol x 2 hours ago.           Fever   This is a new problem. The current episode started in the past 7 days. The maximum temperature noted was 103 to 103.9 F. The temperature was taken using an oral thermometer. Associated symptoms include chest pain, congestion, coughing and headaches. She has tried acetaminophen and NSAIDs (meloxicam) for the symptoms. The treatment provided mild relief.       Constitution: Positive for fever.   HENT:  Positive for congestion.    Cardiovascular:  Positive for chest pain.   Respiratory:  Positive for cough.    Neurological:  Positive for headaches.           Objective:      Physical Exam   Constitutional: She is oriented to person, place, and time. normal  HENT:   Head: Normocephalic and atraumatic.   Ears:   Right Ear: Tympanic membrane, external ear and ear canal normal.   Left Ear: Tympanic membrane, external ear and ear canal normal.   Nose: Congestion present.   Mouth/Throat: Mucous membranes are moist. Oropharynx is clear.   Eyes: Conjunctivae are normal. Extraocular movement intact   Cardiovascular: Normal rate, regular rhythm, normal heart sounds and normal pulses.   Pulmonary/Chest: Effort normal and breath sounds normal. "   Abdominal: Normal appearance.   Musculoskeletal: Normal range of motion.         General: Normal range of motion.   Neurological: She is alert and oriented to person, place, and time.   Skin: Skin is warm and dry.   Psychiatric: Her behavior is normal.   Vitals reviewed.        Past medical history and current medications reviewed.     Results for orders placed or performed in visit on 09/19/23   SARS Coronavirus 2 Antigen, POCT Manual Read   Result Value Ref Range    SARS Coronavirus 2 Antigen Negative Negative     Acceptable Yes    POCT Influenza A/B MOLECULAR   Result Value Ref Range    POC Molecular Influenza A Ag Negative Negative, Not Reported    POC Molecular Influenza B Ag Negative Negative, Not Reported     Acceptable Yes    POCT Strep A, Molecular   Result Value Ref Range    Molecular Strep A, POC Negative Negative     Acceptable Yes    POCT respiratory syncytial virus   Result Value Ref Range    RSV Rapid Ag Negative Negative     Acceptable Yes    POCT Infectious mononucleosis antibody   Result Value Ref Range    Monospot Negative Negative     Acceptable Yes         Assessment:           1. Bronchitis    2. Acute cough              Plan:     Rest. Meds as prescribed. Follow up as needed.    Bronchitis  -     amoxicillin (AMOXIL) 500 MG capsule; Take 1 capsule (500 mg total) by mouth every 8 (eight) hours. for 10 days  Dispense: 30 capsule; Refill: 0  -     albuterol (PROAIR HFA) 90 mcg/actuation inhaler; Inhale 2 puffs into the lungs every 6 (six) hours as needed for Wheezing. Rescue  Dispense: 1 g; Refill: 0  -     predniSONE (DELTASONE) 10 MG tablet; Take 1 tablet (10 mg total) by mouth once daily. for 4 days  Dispense: 4 tablet; Refill: 0  -     promethazine-dextromethorphan (PROMETHAZINE-DM) 6.25-15 mg/5 mL Syrp; Take 5 mLs by mouth every 4 (four) hours as needed (cough).  Dispense: 118 mL; Refill: 0    Acute cough  -      SARS Coronavirus 2 Antigen, POCT Manual Read  -     POCT Influenza A/B MOLECULAR  -     POCT Strep A, Molecular  -     POCT respiratory syncytial virus  -     POCT Infectious mononucleosis antibody             There are no Patient Instructions on file for this visit.

## 2023-09-19 NOTE — LETTER
September 19, 2023      [place] [facility]  [facility], [address]  [place] [address]  Phone: [phone]  Fax: [phone]       Patient: Saima Winkler   YOB: 1970  Date of Visit: 09/19/2023    To Whom It May Concern:    Nay Winkler  was at [facility] on 09/19/2023. The patient may return to work/school on 09/25/2023 with no restrictions. If you have any questions or concerns, or if I can be of further assistance, please do not hesitate to contact me.    Sincerely,    Katie Zapien MA

## 2023-09-24 LAB
BACTERIA BLD CULT: NORMAL
BACTERIA BLD CULT: NORMAL

## 2023-10-07 RX ORDER — PREGABALIN 50 MG/1
50 CAPSULE ORAL 3 TIMES DAILY
Qty: 90 CAPSULE | Refills: 0 | Status: SHIPPED | OUTPATIENT
Start: 2023-10-07 | End: 2023-12-19 | Stop reason: SDUPTHER

## 2023-10-16 ENCOUNTER — OFFICE VISIT (OUTPATIENT)
Dept: URGENT CARE | Facility: CLINIC | Age: 53
End: 2023-10-16
Payer: COMMERCIAL

## 2023-10-16 VITALS
SYSTOLIC BLOOD PRESSURE: 158 MMHG | DIASTOLIC BLOOD PRESSURE: 92 MMHG | WEIGHT: 165 LBS | TEMPERATURE: 98 F | RESPIRATION RATE: 19 BRPM | HEART RATE: 86 BPM | OXYGEN SATURATION: 98 % | BODY MASS INDEX: 25.9 KG/M2 | HEIGHT: 67 IN

## 2023-10-16 DIAGNOSIS — R09.81 NASAL CONGESTION: ICD-10-CM

## 2023-10-16 DIAGNOSIS — R05.9 COUGH, UNSPECIFIED TYPE: ICD-10-CM

## 2023-10-16 DIAGNOSIS — U07.1 COVID-19: Primary | ICD-10-CM

## 2023-10-16 LAB
CTP QC/QA: YES
SARS-COV-2 AG RESP QL IA.RAPID: POSITIVE

## 2023-10-16 PROCEDURE — 99213 OFFICE O/P EST LOW 20 MIN: CPT | Mod: S$GLB,,,

## 2023-10-16 PROCEDURE — 87811 SARS CORONAVIRUS 2 ANTIGEN POCT, MANUAL READ: ICD-10-PCS | Mod: QW,S$GLB,,

## 2023-10-16 PROCEDURE — 99213 PR OFFICE/OUTPT VISIT, EST, LEVL III, 20-29 MIN: ICD-10-PCS | Mod: S$GLB,,,

## 2023-10-16 PROCEDURE — 87811 SARS-COV-2 COVID19 W/OPTIC: CPT | Mod: QW,S$GLB,,

## 2023-10-16 RX ORDER — PROMETHAZINE HYDROCHLORIDE AND DEXTROMETHORPHAN HYDROBROMIDE 6.25; 15 MG/5ML; MG/5ML
5 SYRUP ORAL EVERY 8 HOURS PRN
Qty: 118 ML | Refills: 0 | Status: SHIPPED | OUTPATIENT
Start: 2023-10-16 | End: 2024-01-24

## 2023-10-16 RX ORDER — FLUTICASONE PROPIONATE 50 MCG
1 SPRAY, SUSPENSION (ML) NASAL DAILY
COMMUNITY
End: 2024-03-24

## 2023-10-16 NOTE — LETTER
October 16, 2023      [place] [facility]  [facility], [address]  [place] [address]  Phone: [phone]  Fax: [phone]       Patient: Saima Winkler   YOB: 1970  Date of Visit: 10/16/2023    To Whom It May Concern:    Nay Winkler  was at [facility] on 10/16/2023. The patient may return to work/school on 10/20/2023 with no restrictions. If you have any questions or concerns, or if I can be of further assistance, please do not hesitate to contact me.    Sincerely,    Annette Alonso, NP

## 2023-10-16 NOTE — PROGRESS NOTES
"Subjective:       Patient ID: Saima Winkler is a 53 y.o. female.    Vitals:  height is 5' 7" (1.702 m) and weight is 74.8 kg (165 lb). Her oral temperature is 98.2 °F (36.8 °C). Her blood pressure is 158/92 (abnormal) and her pulse is 86. Her respiration is 19 and oxygen saturation is 98%.     Chief Complaint: positive home covid test (Positive home covid test. Needs to be retested for work)    This is a 53 y.o. female who presents today with a chief complaint of Positive home covid test. Needs to be retested for work  Patient presents with:  positive home covid test: Positive home covid test. Needs to be retested for work           Constitution: Negative.   HENT:  Positive for congestion and sore throat.    Neck: neck negative.   Cardiovascular: Negative.    Eyes: Negative.    Respiratory:  Positive for cough.    Gastrointestinal: Negative.    Endocrine: negative.   Genitourinary: Negative.    Musculoskeletal:  Positive for muscle ache.   Skin: Negative.    Allergic/Immunologic: Negative.    Neurological: Negative.    Hematologic/Lymphatic: Negative.    Psychiatric/Behavioral: Negative.             Objective:      Physical Exam   Constitutional: She is oriented to person, place, and time.   HENT:   Head: Normocephalic and atraumatic.   Nose: Congestion present.   Mouth/Throat: Posterior oropharyngeal erythema present.   Eyes: Conjunctivae are normal. Pupils are equal, round, and reactive to light.   Neck: Neck supple.   Cardiovascular: Normal rate, regular rhythm, normal heart sounds and normal pulses.   Pulmonary/Chest: Effort normal and breath sounds normal.   Abdominal: Normal appearance.   Musculoskeletal: Normal range of motion.         General: Normal range of motion.   Neurological: no focal deficit. She is alert, oriented to person, place, and time and at baseline.   Skin: Skin is warm.   Psychiatric: Her behavior is normal. Mood, judgment and thought content normal.   Nursing note and vitals " reviewed.        Past medical history and current medications reviewed.       Assessment:     Results for orders placed or performed in visit on 10/16/23   SARS Coronavirus 2 Antigen, POCT Manual Read   Result Value Ref Range    SARS Coronavirus 2 Antigen Positive (A) Negative     Acceptable Yes            1. COVID-19    2. Nasal congestion    3. Cough, unspecified type              Plan:         COVID-19  -     promethazine-dextromethorphan (PROMETHAZINE-DM) 6.25-15 mg/5 mL Syrp; Take 5 mLs by mouth every 8 (eight) hours as needed (cough).  Dispense: 118 mL; Refill: 0    Nasal congestion  -     SARS Coronavirus 2 Antigen, POCT Manual Read  -     promethazine-dextromethorphan (PROMETHAZINE-DM) 6.25-15 mg/5 mL Syrp; Take 5 mLs by mouth every 8 (eight) hours as needed (cough).  Dispense: 118 mL; Refill: 0    Cough, unspecified type  -     SARS Coronavirus 2 Antigen, POCT Manual Read  -     promethazine-dextromethorphan (PROMETHAZINE-DM) 6.25-15 mg/5 mL Syrp; Take 5 mLs by mouth every 8 (eight) hours as needed (cough).  Dispense: 118 mL; Refill: 0             There are no Patient Instructions on file for this visit.

## 2023-10-18 ENCOUNTER — TELEPHONE (OUTPATIENT)
Dept: URGENT CARE | Facility: CLINIC | Age: 53
End: 2023-10-18
Payer: COMMERCIAL

## 2023-11-15 ENCOUNTER — OFFICE VISIT (OUTPATIENT)
Dept: PHYSICAL MEDICINE AND REHAB | Facility: CLINIC | Age: 53
End: 2023-11-15
Payer: COMMERCIAL

## 2023-11-15 VITALS
HEART RATE: 76 BPM | WEIGHT: 165 LBS | DIASTOLIC BLOOD PRESSURE: 85 MMHG | HEIGHT: 67 IN | SYSTOLIC BLOOD PRESSURE: 130 MMHG | BODY MASS INDEX: 25.9 KG/M2

## 2023-11-15 DIAGNOSIS — M53.3 SACROILIAC DYSFUNCTION: Primary | ICD-10-CM

## 2023-11-15 DIAGNOSIS — M53.3 SACROILIAC JOINT DYSFUNCTION OF LEFT SIDE: ICD-10-CM

## 2023-11-15 PROCEDURE — 27096 PR INJECTION,SACROILIAC JOINT: ICD-10-PCS | Mod: LT,S$GLB,, | Performed by: STUDENT IN AN ORGANIZED HEALTH CARE EDUCATION/TRAINING PROGRAM

## 2023-11-15 PROCEDURE — 3079F PR MOST RECENT DIASTOLIC BLOOD PRESSURE 80-89 MM HG: ICD-10-PCS | Mod: CPTII,S$GLB,, | Performed by: STUDENT IN AN ORGANIZED HEALTH CARE EDUCATION/TRAINING PROGRAM

## 2023-11-15 PROCEDURE — 3008F PR BODY MASS INDEX (BMI) DOCUMENTED: ICD-10-PCS | Mod: CPTII,S$GLB,, | Performed by: STUDENT IN AN ORGANIZED HEALTH CARE EDUCATION/TRAINING PROGRAM

## 2023-11-15 PROCEDURE — 99999 PR PBB SHADOW E&M-EST. PATIENT-LVL III: CPT | Mod: PBBFAC,,, | Performed by: STUDENT IN AN ORGANIZED HEALTH CARE EDUCATION/TRAINING PROGRAM

## 2023-11-15 PROCEDURE — 99999 PR PBB SHADOW E&M-EST. PATIENT-LVL III: ICD-10-PCS | Mod: PBBFAC,,, | Performed by: STUDENT IN AN ORGANIZED HEALTH CARE EDUCATION/TRAINING PROGRAM

## 2023-11-15 PROCEDURE — 3044F PR MOST RECENT HEMOGLOBIN A1C LEVEL <7.0%: ICD-10-PCS | Mod: CPTII,S$GLB,, | Performed by: STUDENT IN AN ORGANIZED HEALTH CARE EDUCATION/TRAINING PROGRAM

## 2023-11-15 PROCEDURE — 3044F HG A1C LEVEL LT 7.0%: CPT | Mod: CPTII,S$GLB,, | Performed by: STUDENT IN AN ORGANIZED HEALTH CARE EDUCATION/TRAINING PROGRAM

## 2023-11-15 PROCEDURE — 3008F BODY MASS INDEX DOCD: CPT | Mod: CPTII,S$GLB,, | Performed by: STUDENT IN AN ORGANIZED HEALTH CARE EDUCATION/TRAINING PROGRAM

## 2023-11-15 PROCEDURE — 27096 INJECT SACROILIAC JOINT: CPT | Mod: LT,S$GLB,, | Performed by: STUDENT IN AN ORGANIZED HEALTH CARE EDUCATION/TRAINING PROGRAM

## 2023-11-15 PROCEDURE — 1159F PR MEDICATION LIST DOCUMENTED IN MEDICAL RECORD: ICD-10-PCS | Mod: CPTII,S$GLB,, | Performed by: STUDENT IN AN ORGANIZED HEALTH CARE EDUCATION/TRAINING PROGRAM

## 2023-11-15 PROCEDURE — 3075F PR MOST RECENT SYSTOLIC BLOOD PRESS GE 130-139MM HG: ICD-10-PCS | Mod: CPTII,S$GLB,, | Performed by: STUDENT IN AN ORGANIZED HEALTH CARE EDUCATION/TRAINING PROGRAM

## 2023-11-15 PROCEDURE — 3079F DIAST BP 80-89 MM HG: CPT | Mod: CPTII,S$GLB,, | Performed by: STUDENT IN AN ORGANIZED HEALTH CARE EDUCATION/TRAINING PROGRAM

## 2023-11-15 PROCEDURE — 1159F MED LIST DOCD IN RCRD: CPT | Mod: CPTII,S$GLB,, | Performed by: STUDENT IN AN ORGANIZED HEALTH CARE EDUCATION/TRAINING PROGRAM

## 2023-11-15 PROCEDURE — 99215 PR OFFICE/OUTPT VISIT, EST, LEVL V, 40-54 MIN: ICD-10-PCS | Mod: 25,S$GLB,, | Performed by: STUDENT IN AN ORGANIZED HEALTH CARE EDUCATION/TRAINING PROGRAM

## 2023-11-15 PROCEDURE — 3075F SYST BP GE 130 - 139MM HG: CPT | Mod: CPTII,S$GLB,, | Performed by: STUDENT IN AN ORGANIZED HEALTH CARE EDUCATION/TRAINING PROGRAM

## 2023-11-15 PROCEDURE — 99215 OFFICE O/P EST HI 40 MIN: CPT | Mod: 25,S$GLB,, | Performed by: STUDENT IN AN ORGANIZED HEALTH CARE EDUCATION/TRAINING PROGRAM

## 2023-11-15 RX ORDER — TRIAMCINOLONE ACETONIDE 40 MG/ML
40 INJECTION, SUSPENSION INTRA-ARTICULAR; INTRAMUSCULAR
Status: DISCONTINUED | OUTPATIENT
Start: 2023-11-15 | End: 2023-11-15 | Stop reason: HOSPADM

## 2023-11-15 RX ADMIN — TRIAMCINOLONE ACETONIDE 40 MG: 40 INJECTION, SUSPENSION INTRA-ARTICULAR; INTRAMUSCULAR at 02:11

## 2023-11-15 NOTE — PROGRESS NOTES
PHYSICAL MEDICINE AND REHABILITATION  New Patient Consult:    Subjective:   Chief Complaint:    Chief Complaint   Patient presents with    Back Pain     SIJ flare up x 1.5 weeks, pain radiates to the lower back and hips       HPI: Saima Winkler is a 53 y.o. female with  has a past medical history of Anxiety and Depression. She was sent to me for consultation for Back Pain (SIJ flare up x 1.5 weeks, pain radiates to the lower back and hips)   Today,  she presents to me as a new patient.  She was previously a patient of Dr. Gus Jacobo as well as Dr. Meggan Patterson.  At her last visit with Dr. Patterson she underwent a right GTB injection with ultrasound guidance.  Prior to this on 05/31/2022, she underwent a left SI joint injection under ultrasound guidance.  With complaints of left-sided SI joint pain for the past 1.5 weeks.  She is requesting an injection similar to 1 in the past.  She denies any changes in character or distribution of the pain.  The last injection that she underwent with steroid was nearly a year ago.  She was recently diagnosed with a respiratory infection over a month ago and reports complete resolution of symptoms.  She takes baclofen, Lyrica and uses heat which provide on sustained relief recently.  She describes the pain as aching, tight and deep.  The pain is made worse with sitting and standing.  Pain improves with bending.  She has been to therapy in the past without sustained relief.    Review of Systems  Joint stiffness/swelling    Imaging/Diagnostic Studies    MRI LS Spine 2021  Multiplanar noncontrast imaging was performed. Comparison is  made to May 2019.     There is no evidence of lumbar fracture, subluxation, or osseous  destructive lesion. Marrow signal is mildly heterogeneous. Small vertebral body hemangiomas are noted at L1 and L3, similar to the prior study. Signal within the conus medullaris is within normal limits. Multilevel disc desiccation is noted.     T12-L1: No  significant abnormalities.     L1-2: Minimal broad-based disc bulging without central canal or foraminal stenosis, unchanged.     L2-3: Mild broad-based disc bulge results in mild narrowing of the central canal, without significant foraminal stenosis, unchanged.     L3-4: Mild broad-based disc bulge results in no significant central canal stenosis. There is mild bilateral foraminal narrowing, unchanged.     L4-5: There is moderate loss of intervertebral disc height. Mild broad-based disc/osteophyte complex results in mild central canal stenosis, unchanged. Mild facet and ligamentum flavum hypertrophy are also noted. There is mild bilateral left-sided foraminal narrowing without direct nerve root impingement.     L5-S1: Mild broad-based disc protrusion and small outer annular tear are noted. There is resultant minor narrowing of the central canal. In combination with mild facet hypertrophy, there is mild bilateral foraminal narrowing, without direct nerve root impingement.     IMPRESSION: 1. Mild multilevel lumbar degenerative disc and facet disease as detailed at each individual level above. There is no evidence of high-grade spinal stenosis or direct nerve root impingement. Findings are similar in appearance to May 8, 2019.    Past Medical History:   Diagnosis Date    Anxiety     Depression        Past Surgical History:   Procedure Laterality Date    HYSTERECTOMY      KNEE SURGERY      ROBOTIC ARTHROPLASTY, KNEE Right 9/19/2022    Procedure: ROBOTIC ARTHROPLASTY, KNEE, TOTAL;  Surgeon: Eduar Whittington MD;  Location: [facility] OR;  Service: Orthopedics;  Laterality: Right;    SHOULDER SURGERY      TRANSFORAMINAL EPIDURAL INJECTION OF STEROID Bilateral 6/4/2019    Procedure: Injection,steroid,epidural,transforaminal approach L4-5;  Surgeon: Jeyson Kent MD;  Location: [facility] OR;  Service: Pain Management;  Laterality: Bilateral;    TRANSFORAMINAL EPIDURAL INJECTION OF STEROID Bilateral 7/25/2019    Procedure:  "Injection,steroid,epidural,transforaminal approach;  Surgeon: Jeyson Kent MD;  Location: [facility] OR;  Service: Pain Management;  Laterality: Bilateral;  L4-5    vaginal sling       Review of patient's allergies indicates:  No Known Allergies    Current Outpatient Medications   Medication Sig Dispense Refill    baclofen (LIORESAL) 10 MG tablet Take 1 tablet (10 mg total) by mouth every evening. 30 tablet 5    calcium carbonate/vitamin D3 (CALTRATE 600 + D ORAL)       cetirizine (ZYRTEC) 10 MG tablet Take 10 mg by mouth once daily.      estradioL (ESTRACE) 1 MG tablet Take 1 tablet (1 mg total) by mouth once daily. 90 tablet 1    fluticasone propionate (FLONASE) 50 mcg/actuation nasal spray 1 spray by Each Nostril route once daily.      magnesium hydroxide 400 mg/5 ml (MILK OF MAGNESIA) 400 mg/5 mL Susp       meloxicam (MOBIC) 15 MG tablet Take 1 tablet (15 mg total) by mouth once daily. 30 tablet 5    pregabalin (LYRICA) 50 MG capsule Take 1 capsule (50 mg total) by mouth 3 (three) times daily. 90 capsule 0    promethazine-dextromethorphan (PROMETHAZINE-DM) 6.25-15 mg/5 mL Syrp Take 5 mLs by mouth every 8 (eight) hours as needed (cough). 118 mL 0    albuterol (PROAIR HFA) 90 mcg/actuation inhaler Inhale 2 puffs into the lungs every 6 (six) hours as needed for Wheezing. Rescue 1 g 0     No current facility-administered medications for this visit.       Family History   Problem Relation Age of Onset    Asthma Mother     Depression Mother     Hypertension Mother     Heart disease Father     Depression Father     Hypertension Father        Social History     Socioeconomic History    Marital status:      Spouse name: Stacy Garcia (Ken)    Number of children: 3   Occupational History    Occupation: Registered Nurse      Employer: [employer]     Comment: Cancer Center   Tobacco Use    Smoking status: Former     Current packs/day: 0.00     Types: Cigarettes     Quit date: 1/1/2007     Years since quitting: " "16.8    Smokeless tobacco: Never   Substance and Sexual Activity    Alcohol use: Yes    Drug use: No    Sexual activity: Yes     Partners: Male   Social History Narrative    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  She has 3 children age 33, 28and 22     Social Determinants of Health     Financial Resource Strain: Low Risk  (1/30/2023)    Overall Financial Resource Strain (CARDIA)     Difficulty of Paying Living Expenses: Not very hard   Food Insecurity: No Food Insecurity (1/30/2023)    Hunger Vital Sign     Worried About Running Out of Food in the Last Year: Never true     Ran Out of Food in the Last Year: Never true   Transportation Needs: No Transportation Needs (1/30/2023)    PRAPARE - Transportation     Lack of Transportation (Medical): No     Lack of Transportation (Non-Medical): No   Physical Activity: Insufficiently Active (1/30/2023)    Exercise Vital Sign     Days of Exercise per Week: 5 days     Minutes of Exercise per Session: 20 min   Stress: Stress Concern Present (1/30/2023)    [employer] [place] [employer] - Occupational Stress Questionnaire     Feeling of Stress : To some extent   Social Connections: Socially Integrated (1/30/2023)    Social Connection and Isolation Panel [NHANES]     Frequency of Communication with Friends and Family: More than three times a week     Frequency of Social Gatherings with Friends and Family: Twice a week     Attends Mandaeism Services: More than 4 times per year     Active Member of Clubs or Organizations: Yes     Attends Club or Organization Meetings: 1 to 4 times per year     Marital Status:    Housing Stability: Low Risk  (1/30/2023)    Housing Stability Vital Sign     Unable to Pay for Housing in the Last Year: No     Number of Places Lived in the Last Year: 1     Unstable Housing in the Last Year: No         Objective:    /85   Pulse 76   Ht 5' 7" (1.702 m)   Wt 74.8 kg (165 lb)   BMI " 25.84 kg/m²   Physical Exam  Vitals and nursing note reviewed.   Constitutional:       Appearance: Normal appearance.   HENT:      Head: Normocephalic.   Eyes:      Extraocular Movements: Extraocular movements intact.   Cardiovascular:      Rate and Rhythm: Normal rate.      Pulses: Normal pulses.   Pulmonary:      Effort: Pulmonary effort is normal.   Abdominal:      General: Abdomen is flat.   Musculoskeletal:      Lumbar back: Negative left straight leg raise test.   Skin:     General: Skin is warm and dry.   Neurological:      General: No focal deficit present.      Mental Status: She is alert and oriented to person, place, and time. Mental status is at baseline.   Psychiatric:         Mood and Affect: Mood normal.         Behavior: Behavior normal.         Thought Content: Thought content normal.        Back Exam     Tenderness   The patient is experiencing tenderness in the sacroiliac (Tenderness to palpation along the left SI joint).    Range of Motion   Extension:  normal   Flexion:  normal   Lateral bend right:  normal   Lateral bend left:  normal   Rotation right:  normal   Rotation left:  normal     Muscle Strength   Right Quadriceps:  5/5   Left Quadriceps:  5/5   Right Hamstrings:  5/5   Left Hamstrings:  5/5     Tests   Straight leg raise left: negative    Reflexes   Patellar:  normal  Achilles:  normal    Other   Sensation: normal  Gait: normal   Erythema: no back redness  Scars: absent    Comments:  Pain with tenderness left SI  JESSICA mildly reproduces pain   Pain relieved by hip flexion  Moderate amount of pain with sacral compression  Keli's test caused pain over left SI and not GTB               Assessment:       ICD-10-CM ICD-9-CM    1. Sacroiliac dysfunction  M53.3 724.6 Large Joint Aspiration/Injection: L sacroiliac joint      2. Sacroiliac joint dysfunction of left side  M53.3 724.6             Plan:   1. Sacroiliac dysfunction  Assessment & Plan:  Left ultrasound guided SI joint injection  with Kenalog (40 mg) and 2 cc of lidocaine.  3 cc of 1% lidocaine utilized to anesthetize the area prior to injection.  Ultrasound guidance was used for both.  See procedure note for details   Continue Lyrica and baclofen as prescribed by other providers.    She has been to physical therapy with limited results that were never sustained.  Return to clinic p.r.n..    Orders:  -     Large Joint Aspiration/Injection: L sacroiliac joint    2. Sacroiliac joint dysfunction of left side  Assessment & Plan:  Left ultrasound guided SI joint injection with Kenalog (40 mg) and 2 cc of lidocaine.  3 cc of 1% lidocaine utilized to anesthetize the area prior to injection.  Ultrasound guidance was used for both.  See procedure note for details   Continue Lyrica and baclofen as prescribed by other providers.    She has been to physical therapy with limited results that were never sustained.  Return to clinic p.r.n..             Garcia Muller MD  Physical Medicine & Rehabilitation     Disclaimer:  This note may have been prepared using voice recognition software, it may have not been extensively proofed, as such there could be errors within the text such as sound alike errors.  Contact the author of this note for clarification.

## 2023-11-15 NOTE — ASSESSMENT & PLAN NOTE
Left ultrasound guided SI joint injection with Kenalog (40 mg) and 2 cc of lidocaine.  3 cc of 1% lidocaine utilized to anesthetize the area prior to injection.  Ultrasound guidance was used for both.  See procedure note for details   Continue Lyrica and baclofen as prescribed by other providers.    She has been to physical therapy with limited results that were never sustained.  Return to clinic p.r.n..

## 2023-11-15 NOTE — PROCEDURES
Large Joint Aspiration/Injection: L sacroiliac joint    Date/Time: 11/15/2023 2:00 PM    Performed by: Garcia Muller MD  Authorized by: Garcia Muller MD    Consent Done?:  Yes (Verbal)  Indications:  Arthritis, pain and diagnostic evaluation  Site marked: the procedure site was marked    Timeout: prior to procedure the correct patient, procedure, and site was verified      Local anesthesia used?: Yes    Local anesthetic:  Lidocaine 1% without epinephrine  Anesthetic total (ml):  2      Details:  Needle Size:  22 G  Ultrasonic Guidance for needle placement?: Yes    Images are saved and documented.  Approach:  Posterior  Location:  Hip (Left SIJ)  Hip joint: Left sacroiliac joint.  Medications:  40 mg triamcinolone acetonide 40 mg/mL  Patient tolerance:  Patient tolerated the procedure well with no immediate complications     Ultrasound guidance was used for correct needle placement, the images were saved and are available for review on the ultrasound machine.

## 2023-12-19 ENCOUNTER — PATIENT MESSAGE (OUTPATIENT)
Dept: FAMILY MEDICINE | Facility: CLINIC | Age: 53
End: 2023-12-19
Payer: COMMERCIAL

## 2023-12-19 RX ORDER — PREGABALIN 50 MG/1
50 CAPSULE ORAL 3 TIMES DAILY
Qty: 90 CAPSULE | Refills: 0 | Status: SHIPPED | OUTPATIENT
Start: 2023-12-19 | End: 2024-02-10 | Stop reason: SDUPTHER

## 2024-01-24 ENCOUNTER — OFFICE VISIT (OUTPATIENT)
Dept: URGENT CARE | Facility: CLINIC | Age: 54
End: 2024-01-24
Payer: COMMERCIAL

## 2024-01-24 VITALS
RESPIRATION RATE: 18 BRPM | BODY MASS INDEX: 25.9 KG/M2 | TEMPERATURE: 101 F | SYSTOLIC BLOOD PRESSURE: 155 MMHG | DIASTOLIC BLOOD PRESSURE: 98 MMHG | OXYGEN SATURATION: 97 % | WEIGHT: 165 LBS | HEART RATE: 120 BPM | HEIGHT: 67 IN

## 2024-01-24 DIAGNOSIS — J10.1 INFLUENZA A: Primary | ICD-10-CM

## 2024-01-24 DIAGNOSIS — R50.9 FEVER, UNSPECIFIED FEVER CAUSE: ICD-10-CM

## 2024-01-24 LAB
CTP QC/QA: YES
POC MOLECULAR INFLUENZA A AGN: POSITIVE
POC MOLECULAR INFLUENZA B AGN: NEGATIVE

## 2024-01-24 PROCEDURE — 99213 OFFICE O/P EST LOW 20 MIN: CPT | Mod: S$GLB,,,

## 2024-01-24 PROCEDURE — 87502 INFLUENZA DNA AMP PROBE: CPT | Mod: QW,,,

## 2024-01-24 RX ORDER — PROMETHAZINE HYDROCHLORIDE AND DEXTROMETHORPHAN HYDROBROMIDE 6.25; 15 MG/5ML; MG/5ML
5 SYRUP ORAL EVERY 8 HOURS PRN
Qty: 120 ML | Refills: 0 | Status: SHIPPED | OUTPATIENT
Start: 2024-01-24 | End: 2024-02-26 | Stop reason: ALTCHOICE

## 2024-01-24 RX ORDER — OSELTAMIVIR PHOSPHATE 75 MG/1
75 CAPSULE ORAL 2 TIMES DAILY
Qty: 10 CAPSULE | Refills: 0 | Status: SHIPPED | OUTPATIENT
Start: 2024-01-24 | End: 2024-01-29

## 2024-01-24 NOTE — PROGRESS NOTES
"Subjective:      Patient ID: Saima Winkler is a 53 y.o. female.    Vitals:  height is 5' 7" (1.702 m) and weight is 74.8 kg (165 lb). Her oral temperature is 100.6 °F (38.1 °C) (abnormal). Her blood pressure is 155/98 (abnormal) and her pulse is 120 (abnormal). Her respiration is 18 and oxygen saturation is 97%.     Chief Complaint: Fever    This is a 53 y.o. female who presents today with a chief complaint of  last night started getting achy and fatigue. Patient has cough,  runny nose and body aches and fever          Home Treatment:   Nite Quil      Fever   The current episode started yesterday. The problem occurs constantly. The maximum temperature noted was 102 to 102.9 F. The temperature was taken using an oral thermometer. Associated symptoms include congestion, coughing, headaches, muscle aches and a sore throat.       Constitution: Positive for fever.   HENT:  Positive for congestion, postnasal drip, sinus pain, sinus pressure and sore throat.    Neck: neck negative.   Cardiovascular: Negative.    Eyes: Negative.    Respiratory:  Positive for cough.    Gastrointestinal: Negative.    Endocrine: negative.   Genitourinary: Negative.    Musculoskeletal: Negative.    Skin: Negative.    Allergic/Immunologic: Negative.    Neurological:  Positive for headaches.   Hematologic/Lymphatic: Negative.    Psychiatric/Behavioral: Negative.        Objective:     Physical Exam   Constitutional: She is oriented to person, place, and time.   HENT:   Head: Normocephalic and atraumatic.   Ears:   Right Ear: Tympanic membrane, external ear and ear canal normal.   Left Ear: Tympanic membrane, external ear and ear canal normal.   Mouth/Throat: Posterior oropharyngeal erythema present.   Eyes: Conjunctivae are normal. Pupils are equal, round, and reactive to light. Extraocular movement intact   Neck: Neck supple.   Cardiovascular: Normal rate, regular rhythm, normal heart sounds and normal pulses.   Pulmonary/Chest: Breath sounds " normal.   Abdominal: Normal appearance.   Musculoskeletal: Normal range of motion.         General: Normal range of motion.   Neurological: no focal deficit. She is alert and oriented to person, place, and time.   Skin: Skin is warm.   Psychiatric: Her behavior is normal. Mood, judgment and thought content normal.   Nursing note and vitals reviewed.      Assessment:     1. Influenza A    2. Fever, unspecified fever cause      Results for orders placed or performed in visit on 01/24/24   POCT Influenza A/B MOLECULAR   Result Value Ref Range    POC Molecular Influenza A Ag Positive (A) Negative, Not Reported    POC Molecular Influenza B Ag Negative Negative, Not Reported     Acceptable Yes         Plan:       Influenza A  -     oseltamivir (TAMIFLU) 75 MG capsule; Take 1 capsule (75 mg total) by mouth 2 (two) times daily. for 5 days  Dispense: 10 capsule; Refill: 0  -     promethazine-dextromethorphan (PROMETHAZINE-DM) 6.25-15 mg/5 mL Syrp; Take 5 mLs by mouth every 8 (eight) hours as needed (cough).  Dispense: 120 mL; Refill: 0    Fever, unspecified fever cause  -     POCT Influenza A/B MOLECULAR  -     oseltamivir (TAMIFLU) 75 MG capsule; Take 1 capsule (75 mg total) by mouth 2 (two) times daily. for 5 days  Dispense: 10 capsule; Refill: 0  -     promethazine-dextromethorphan (PROMETHAZINE-DM) 6.25-15 mg/5 mL Syrp; Take 5 mLs by mouth every 8 (eight) hours as needed (cough).  Dispense: 120 mL; Refill: 0

## 2024-01-24 NOTE — LETTER
January 24, 2024      [place] [facility]  [facility], [address]  [place] [address]  Phone: [phone]  Fax: [phone]       Patient: Saima Winkler   YOB: 1970  Date of Visit: 01/24/2024    To Whom It May Concern:    Nay Winkler  was at [facility] on 01/24/2024. The patient may return to work/school on 01/29/2024 with no restrictions. If you have any questions or concerns, or if I can be of further assistance, please do not hesitate to contact me.    Sincerely,    Annette Alonso, NP

## 2024-02-08 ENCOUNTER — PATIENT MESSAGE (OUTPATIENT)
Dept: FAMILY MEDICINE | Facility: CLINIC | Age: 54
End: 2024-02-08
Payer: COMMERCIAL

## 2024-02-08 DIAGNOSIS — M19.90 ARTHRITIS: Primary | ICD-10-CM

## 2024-02-08 DIAGNOSIS — M79.7 FIBROMYALGIA: ICD-10-CM

## 2024-02-10 RX ORDER — PREGABALIN 50 MG/1
50 CAPSULE ORAL 3 TIMES DAILY
Qty: 90 CAPSULE | Refills: 1 | Status: SHIPPED | OUTPATIENT
Start: 2024-02-10 | End: 2024-03-28

## 2024-02-10 NOTE — TELEPHONE ENCOUNTER
Outpatient Medication Detail     Disp Refills Start End YOSELIN   pregabalin (LYRICA) 50 MG capsule 90 capsule 1 2/10/2024 -- No   Sig - Route: Take 1 capsule (50 mg total) by mouth 3 (three) times daily. - Oral   Sent to pharmacy as: pregabalin (LYRICA) 50 MG capsule   Class: Normal   Order: [id number]   Date/Time Signed: 2/10/2024 09:17       E-Prescribing Status: Sent to pharmacy (2/10/2024  9:17 AM CST)     Pharmacy    [facility] - [place], [place] - [address]    Associated Diagnoses    Arthritis  - Primary      Fibromyalgia           ===View-only below this line===      ----- Message -----       From:Saima Winkler       Sent:2/9/2024  4:10 PM CST         To:Patient Medical Advice Request Message List    Subject:[facility] Whitting[facility] office wont refill this time     [facility] in [place].  I have an appt with Dr Chandler in April. Thank you so much       ----- Message -----       From:Gerardo Buchanan MD       Sent:2/9/2024 12:07 PM CST         To:Saima Winkler    Subject:[facility] Whitting[facility] office wont refill this time     Okay no problems and let me know which pharmacy you use.  Obviously Lyrica is a controlled prescription and will definitely need follow-up with any of the providers.  See if you can get rheumatology consultation also but will keep you covered in the interim.  I will be looking at my computer messages probably for the next few hours and then perhaps on Sunday.      ----- Message -----       From:Saima Winkler       Sent:2/8/2024  7:36 PM CST         To:Gerardo Buchanan MD    Subject:[facility] Whitting[facility] office wont refill this time     I have been trying to get back into to see Rheum.I know its been a big problem territory wide with that particular specialty.Finally was able to get an appointment for April. I have been on lyrica for almost a year now that rheum. prescribed for fibro and have not had any problems getting it filled. I thought Dr Chandler refilled my last  prescription but apparently my pharmacy sent it to Dr Mosquera office for refill ( I took it very briefly with them after my knee replacement) and they refilled it.Now, that Rx is done and I am having trouble getting it refilled.Im am using it sparingly so not to run out but my fibromyalgia symptoms are terrible.  Brain fog pain and anxiety.I need a refill please.I see you 2/26

## 2024-02-24 NOTE — PROGRESS NOTES
Subjective:       Patient ID: Saima Winkler is a 53 y.o. female.    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        Her underlying medical history is as below:-    1.- Hot flashes/Vasomotor symptoms- on HRT  2.- Arthritis Baclofen/Meloxicam Knee POST Op   3.- Chronic constipation- MOM previously on Linzess but that caused diarrhea.  4.-Sinus issues- Flonase/Zyrtec  5- Fibromyalgia- Dr Hung- Lyrica .She has established with Dr. Rusty Chandler.  She has been prescribed Lyrica and that has been apparently a game changer.  Her pain is much better at this point.  6. She had checked her cortisol levels to be low in past from a kit.    ////      1.-hot flashes and being currently on HRT.  She has a staggered and delayed appointment with gynecologist and would like to have 90 days of prescription for estradiol.  She does plan to wean off this medication slowly and steadily over a period of time.  This helps her with hot flashes.  Does not have a gynecologist thus far.  I have encouraged her to set up an appointment.  I will set up an appointment with  Naomi Tripp.  2.-chronic constipation and previously she was prescribed Linzess which did seem to help her.  Unfortunately it seem to cause him more diarrhea than benefit.  She has stopped taking it and she is currently stable on over-the-counter supplements and sometimes magnesium supplements which keeps her acceptably regular.  Milk of magnesia helps her.  3.-complains of chronic knee pain on the right side especially around the kneecap.  She had a meniscus surgery in past.  She has been told that she has arthritis and cartilage problems and may need some surgical intervention in future.  Currently on Lyrica also.  The knee surgery postoperative was somewhat rough with a prolonged recovery period.  Kindly she seems to be getting around.  4.-recently for some reason she checked  "her cortisol levels from home kit and she found that the levels were low.  Perhaps some fatigue but nothing great.  No low blood pressures.  No fainting spells.  5.-sinus allergies and problems for which she takes cetirizine over-the-counter.  Flonase  6.-long-term use of estrogen.  7.-diagnosis of fibromyalgia.  Dr. Brad Hung has recently relocated and retired.      /////  Social history indicates that she is  .    This is her 2nd marriage.  She has 3 children. 34, 30 24  She works as a registered nurse at [employer].  She is a former smoker having quit in .  Social alcohol use.      No recent foreign travel.    She has had at least 3 doses of COVID vaccine.  I have encouraged her to continue with COVID precautions and consider regular immunizations.    She unfortunately did have 3 COVID infections and recently she had a bad case of flu to the point that she almost thought she had pneumonia also.  She was wheezing and took albuterol.    Shingles- completed  Mammogram-completed  Pap snear-status post hysterectomy.  Colon Cancer screen-2021.  Sun exposure          Preventive care issues discussed include shingles vaccine, booster dosage of COVID vaccine and influenza vaccination.  She did get 1 vaccine in past at [facility] and she is pending getting the booster dosage.  If she does get it, she should forward us the confirmation of same.  She did get the flu shot in our office today.    Social determinants of health care have been reviewed.    Family history pertinent- father is  at the age of 65 and he had COPD and CHF.  Mother is spunky and alive at 82 and still working.            Past Medical History:   Diagnosis Date    Anxiety     Depression      Social History     Socioeconomic History    Marital status:      Spouse name: Stacy Garcia (Ken)    Number of children: 3   Occupational History    Occupation: Registered Nurse      Employer: [employer]     " Comment: [employer]   Tobacco Use    Smoking status: Former     Current packs/day: 0.00     Types: Cigarettes     Quit date: 2007     Years since quittin.1    Smokeless tobacco: Never   Substance and Sexual Activity    Alcohol use: Yes     Comment: Social occasional drinks    Drug use: No    Sexual activity: Yes     Partners: Male     Birth control/protection: Other-see comments     Comment: Status post hysterectomy simple.  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  She is a registered nurse.  She has 3 children age 34, 30and 24     Social Determinants of Health     Financial Resource Strain: Low Risk  (2023)    Overall Financial Resource Strain (CARDIA)     Difficulty of Paying Living Expenses: Not very hard   Food Insecurity: No Food Insecurity (2023)    Hunger Vital Sign     Worried About Running Out of Food in the Last Year: Never true     Ran Out of Food in the Last Year: Never true   Transportation Needs: No Transportation Needs (2023)    PRAPARE - Transportation     Lack of Transportation (Medical): No     Lack of Transportation (Non-Medical): No   Physical Activity: Insufficiently Active (2023)    Exercise Vital Sign     Days of Exercise per Week: 5 days     Minutes of Exercise per Session: 20 min   Stress: Stress Concern Present (2023)    [place] [employer] - Occupational Stress Questionnaire     Feeling of Stress : To some extent   Social Connections: Socially Integrated (2023)    Social Connection and Isolation Panel [NHANES]     Frequency of Communication with Friends and Family: More than three times a week     Frequency of Social Gatherings with Friends and Family: Twice a week     Attends Hoahaoism Services: More than 4 times per year     Active Member of Clubs or Organizations: Yes     Attends Club or Organization Meetings: 1 to 4 times per year     Marital Status:    Housing  Stability: Low Risk  (1/30/2023)    Housing Stability Vital Sign     Unable to Pay for Housing in the Last Year: No     Number of Places Lived in the Last Year: 1     Unstable Housing in the Last Year: No     Past Surgical History:   Procedure Laterality Date    HYSTERECTOMY      KNEE SURGERY      ROBOTIC ARTHROPLASTY, KNEE Right 9/19/2022    Procedure: ROBOTIC ARTHROPLASTY, KNEE, TOTAL;  Surgeon: Eduar Whittington MD;  Location: [facility] OR;  Service: Orthopedics;  Laterality: Right;    SHOULDER SURGERY      TRANSFORAMINAL EPIDURAL INJECTION OF STEROID Bilateral 6/4/2019    Procedure: Injection,steroid,epidural,transforaminal approach L4-5;  Surgeon: Jeyson Kent MD;  Location: [facility] OR;  Service: Pain Management;  Laterality: Bilateral;    TRANSFORAMINAL EPIDURAL INJECTION OF STEROID Bilateral 7/25/2019    Procedure: Injection,steroid,epidural,transforaminal approach;  Surgeon: Jeyson Kent MD;  Location: [facility] OR;  Service: Pain Management;  Laterality: Bilateral;  L4-5    vaginal sling       Family History   Problem Relation Age of Onset    Asthma Mother     Depression Mother     Hypertension Mother     Heart disease Father         CHF    Depression Father     Hypertension Father     Rheum arthritis Sister     Psoriasis Sister     Thyroid disease Sister         Hypothyroidism    Heart disease Brother         Sick sinus syndrome status post pacemaker placement.    Obesity Brother         Morbid obesity and status post gastric sleeve surgery.    Sleep apnea Brother     No Known Problems Brother        Review of Systems   Constitutional:  Positive for fatigue (Mild fatigue if at all.). Negative for activity change (Was initially limited because of knee pain.  Better now), chills, diaphoresis, fever and unexpected weight change.        2 X Covid infections in past    HENT:  Negative for congestion, hearing loss, rhinorrhea and trouble swallowing.    Eyes:  Negative for discharge, redness and visual disturbance.   Respiratory:   Negative for apnea, cough, chest tightness, wheezing and stridor.    Cardiovascular:  Negative for chest pain, palpitations and leg swelling.        One of the EKGs in past had read incomplete right bundle-branch block and repeat EKG done by her cardiologist did not read out through the computer the same type of finding.  Basically I reviewed the EKG and they look unchanged and I am not convinced about the significance of this finding.  If at all it is true.   Gastrointestinal:  Positive for constipation (Better with magnesium supplements and stool softeners). Negative for abdominal distention, abdominal pain, blood in stool, diarrhea and vomiting.        She takes magnesium supplements which do help.  Linzess apparently caused her dehydration at the dosage she was taking.  She stopped using it.   Endocrine: Negative for cold intolerance, polydipsia and polyuria.        Patient does get hot flashes and menopausal symptoms.  Currently on estradiol 1 mg and would consider weaning it off in future.  Mild fatigue.  She had checked home kit for cortisol level which was apparently low.   Genitourinary:  Negative for difficulty urinating, dyspareunia, dysuria, hematuria and menstrual problem.   Musculoskeletal:  Positive for arthralgias. Negative for joint swelling and neck pain.        Chronic right knee pain and she had a total knee replacement.  After 4 months of postop discomfort she is finally seeing some recovery and relief.    She also has symptoms of fibromyalgia and is being treated with Lyrica and Celebrex which seems to help her.  She is following up with Dr. Rusty Mckenzie   Skin:  Negative for color change, rash and wound.        Again have been reviewed in past pictures in which her fingers look blanched and discolored suggestive of Raynaud's phenomenon.  Pictures of leg show some livido type of pattern.  Currently in office patient is doing okay.  The finger pictures were taken not December 23rd 2022  "which was a ice freezing day.  She does state that her fingers get discolored whenever she puts them in the freezer also.   Neurological:  Negative for dizziness, seizures, syncope, weakness, numbness and headaches.        Intermittent headaches.   Psychiatric/Behavioral:  Negative for agitation, confusion and dysphoric mood. The patient is not nervous/anxious.         After her knee surgery and improvement in pain, stress and anxiety seems to be better.           Objective:      Blood pressure (!) 137/93, pulse 91, resp. rate 14, height 5' 7" (1.702 m), weight 75.2 kg (165 lb 11.2 oz), SpO2 100 %. Body mass index is 25.95 kg/m².  Physical Exam  Constitutional:       General: She is not in acute distress.     Appearance: Normal appearance. She is not ill-appearing, toxic-appearing or diaphoretic.      Comments: BMI is 25.95   HENT:      Right Ear: Hearing, tympanic membrane and ear canal normal.      Left Ear: Hearing, tympanic membrane and ear canal normal.      Ears:      Comments: Patient's hearing is grossly intact to ordinary conversation and finger snapping in the years.     Mouth/Throat:      Pharynx: No oropharyngeal exudate or posterior oropharyngeal erythema.   Eyes:      General: No scleral icterus.     Comments: Patient's vision is grossly intact to 1 in letters and color vision.  Color vision is normal to important and major colors.   Cardiovascular:      Rate and Rhythm: Normal rate and regular rhythm.      Pulses: Normal pulses.   Pulmonary:      Effort: No respiratory distress.      Breath sounds: No stridor.   Abdominal:      General: There is no distension.      Palpations: There is no mass.      Tenderness: There is no abdominal tenderness.   Musculoskeletal:         General: No swelling.      Right lower leg: No edema.      Left lower leg: No edema.        Legs:       Comments: Evidence of surgery on the right knee.  Range of motion complete.     Skin:     Coloration: Skin is not jaundiced or " pale.      Findings: No lesion.      Comments: Mild skin discoloration changes.  Additional-she had gone to a tanning salon and the tan seems to be falling off at this point.   Neurological:      Mental Status: She is alert. Mental status is at baseline.      Cranial Nerves: No cranial nerve deficit.      Sensory: No sensory deficit.   Psychiatric:         Behavior: Behavior normal.      Comments: More euthymic today as compared to past.           Assessment:       Office Visit on 01/24/2024   Component Date Value Ref Range Status    POC Molecular Influenza A Ag 01/24/2024 Positive (A)  Negative, Not Reported Final    POC Molecular Influenza B Ag 01/24/2024 Negative  Negative, Not Reported Final     Acceptable 01/24/2024 Yes   Final       1. Annual physical exam  -     Cancel: Lipid Panel; Future; Expected date: 02/27/2024  -     Cancel: Hemoglobin A1C; Future; Expected date: 02/27/2024  -     Cancel: CBC Auto Differential; Future; Expected date: 02/27/2024  -     Cancel: TSH; Future; Expected date: 02/27/2024  -     Cancel: Urinalysis; Future; Expected date: 02/26/2024  -     CBC Auto Differential; Future; Expected date: 02/27/2024  -     Hemoglobin A1C; Future; Expected date: 02/27/2024  -     Lipid Panel; Future; Expected date: 02/27/2024  -     TSH; Future; Expected date: 02/27/2024  -     Urinalysis; Future; Expected date: 02/26/2024           Annual physical has been performed.  Chronic medical issues have been listed in the history and physical section which include chronic fibromyalgia and arthritic pains.    Historically her lipid panel has always been good.    Hemoglobin A1c was good till last year.      Will check labs again.  Check for blood counts.      Somehow she seems to be a magnet for getting COVID and flu.  Not sure.  She has not immunocompromised at this point.  She is nonsmoker.  Quit smoking in 2007.    One of her gait issue seems to be weight gain and inability to shake it off.   She can not exercise to briskly because of arthritic pains.      Blood pressures are somewhat elevated and will continue to monitor it.  If after 2 or 3 weeks of monitoring her blood pressure remain elevated then will consider treatment.    There was a concern about low cortisol level, but I doubt that with the elevated blood pressure and no weight loss that she has any deficiency of cortisol.  Might have been due to steroid administration and suddenly stopping it in past.    She is updated on colon cancer screening and most of the immunizations.  She also got the shingles vaccine.      I will refer to gyn also for pelvic examination and breast examination.  Though she had hysterectomy.  Plan:   Annual physical exam  -     Cancel: Lipid Panel; Future; Expected date: 02/27/2024  -     Cancel: Hemoglobin A1C; Future; Expected date: 02/27/2024  -     Cancel: CBC Auto Differential; Future; Expected date: 02/27/2024  -     Cancel: TSH; Future; Expected date: 02/27/2024  -     Cancel: Urinalysis; Future; Expected date: 02/26/2024  -     CBC Auto Differential; Future; Expected date: 02/27/2024  -     Hemoglobin A1C; Future; Expected date: 02/27/2024  -     Lipid Panel; Future; Expected date: 02/27/2024  -     TSH; Future; Expected date: 02/27/2024  -     Urinalysis; Future; Expected date: 02/26/2024    Annual physical has been reviewed.      Blood pressure is slightly elevated.      Labs will be ordered.  CBC, lipid panel, A1c, TSH and urine analysis.    She is updated on most of the preventive care issues.    Send referral to Naomi Tripp for pelvic examination and breast examination.  Gyn exam     to observe if she snores at night and stops breathing then will trigger a test forSleep apnea evaluation.    She has been advised to continue to monitor her blood pressures at home and will review the issues of long-term pain/fibromyalgia on blood pressures at three-month follow it    Follow up in about 3 months (around  5/26/2024), or if symptoms worsen or fail to improve, for Blood pressure review.      Current Outpatient Medications:     albuterol (PROAIR HFA) 90 mcg/actuation inhaler, Inhale 2 puffs into the lungs every 6 (six) hours as needed for Wheezing. Rescue, Disp: 1 g, Rfl: 0    baclofen (LIORESAL) 10 MG tablet, Take 1 tablet (10 mg total) by mouth every evening., Disp: 30 tablet, Rfl: 5    calcium carbonate/vitamin D3 (CALTRATE 600 + D ORAL), , Disp: , Rfl:     cetirizine (ZYRTEC) 10 MG tablet, Take 10 mg by mouth once daily., Disp: , Rfl:     estradioL (ESTRACE) 1 MG tablet, Take 1 tablet (1 mg total) by mouth once daily., Disp: 90 tablet, Rfl: 1    fluticasone propionate (FLONASE) 50 mcg/actuation nasal spray, 1 spray by Each Nostril route once daily., Disp: , Rfl:     magnesium hydroxide 400 mg/5 ml (MILK OF MAGNESIA) 400 mg/5 mL Susp, , Disp: , Rfl:     meloxicam (MOBIC) 15 MG tablet, Take 1 tablet (15 mg total) by mouth once daily., Disp: 30 tablet, Rfl: 5    pregabalin (LYRICA) 50 MG capsule, Take 1 capsule (50 mg total) by mouth 3 (three) times daily., Disp: 90 capsule, Rfl: 1    Gerardo Chanel

## 2024-02-26 ENCOUNTER — OFFICE VISIT (OUTPATIENT)
Dept: FAMILY MEDICINE | Facility: CLINIC | Age: 54
End: 2024-02-26
Payer: COMMERCIAL

## 2024-02-26 ENCOUNTER — PATIENT MESSAGE (OUTPATIENT)
Dept: FAMILY MEDICINE | Facility: CLINIC | Age: 54
End: 2024-02-26

## 2024-02-26 ENCOUNTER — TELEPHONE (OUTPATIENT)
Dept: FAMILY MEDICINE | Facility: CLINIC | Age: 54
End: 2024-02-26

## 2024-02-26 VITALS
OXYGEN SATURATION: 100 % | RESPIRATION RATE: 14 BRPM | WEIGHT: 165.69 LBS | BODY MASS INDEX: 26.01 KG/M2 | DIASTOLIC BLOOD PRESSURE: 93 MMHG | SYSTOLIC BLOOD PRESSURE: 137 MMHG | HEIGHT: 67 IN | HEART RATE: 91 BPM

## 2024-02-26 DIAGNOSIS — Z01.419 ENCOUNTER FOR GYNECOLOGICAL EXAMINATION WITHOUT ABNORMAL FINDING: Primary | ICD-10-CM

## 2024-02-26 DIAGNOSIS — Z00.00 ANNUAL PHYSICAL EXAM: Primary | ICD-10-CM

## 2024-02-26 PROCEDURE — 1159F MED LIST DOCD IN RCRD: CPT | Mod: CPTII,S$GLB,, | Performed by: INTERNAL MEDICINE

## 2024-02-26 PROCEDURE — 3080F DIAST BP >= 90 MM HG: CPT | Mod: CPTII,S$GLB,, | Performed by: INTERNAL MEDICINE

## 2024-02-26 PROCEDURE — 3075F SYST BP GE 130 - 139MM HG: CPT | Mod: CPTII,S$GLB,, | Performed by: INTERNAL MEDICINE

## 2024-02-26 PROCEDURE — 99396 PREV VISIT EST AGE 40-64: CPT | Mod: S$GLB,,, | Performed by: INTERNAL MEDICINE

## 2024-02-26 PROCEDURE — 99999 PR PBB SHADOW E&M-EST. PATIENT-LVL IV: CPT | Mod: PBBFAC,,, | Performed by: INTERNAL MEDICINE

## 2024-02-26 PROCEDURE — 3008F BODY MASS INDEX DOCD: CPT | Mod: CPTII,S$GLB,, | Performed by: INTERNAL MEDICINE

## 2024-02-26 PROCEDURE — 1160F RVW MEDS BY RX/DR IN RCRD: CPT | Mod: CPTII,S$GLB,, | Performed by: INTERNAL MEDICINE

## 2024-02-26 NOTE — TELEPHONE ENCOUNTER
Encounter for gynecological examination without abnormal finding  -     Ambulatory referral/consult to Obstetrics / Gynecology; Future; Expected date: 03/26/2024     Ronit Landaverde     For today's office visit I have sent the following referral to april [facility] gyn based at [facility].  They will call you for an appointment.      Also please monitor your blood pressures and keep a journal with date and time for at least 2 or 3 weeks.      Dr. Chanel GOODRICH     Reason for Exam  Priority: Routine  Dx: Encounter for gynecological examination without abnormal finding [Z01.419 (ICD-10-CM)]   Comments: [facility] for routine gyn exam.  May need discussion on potential hormone replacement therapy if needed.  She is status post simple hysterectomy.      Dr. Buchanan     Referral Details    Referred By   Referred To   Gerardo Buchanan MD  [address]  [address]  [place] [address]  Phone: [phone]  Fax: [phone] Diagnoses: Encounter for gynecological examination without abnormal finding  Order: Ambulatory referral/consult to Obstetrics / Gynecology  Reason: Specialty Services Required Naomi Tripp, PA-C  [address]  [address]  [place] [address]  Phone: [phone]  Fax: [phone]

## 2024-03-22 DIAGNOSIS — N95.1 HOT FLASHES DUE TO MENOPAUSE: Chronic | ICD-10-CM

## 2024-03-22 RX ORDER — ESTRADIOL 1 MG/1
TABLET ORAL
Qty: 90 TABLET | Refills: 1 | Status: SHIPPED | OUTPATIENT
Start: 2024-03-22

## 2024-03-24 ENCOUNTER — OFFICE VISIT (OUTPATIENT)
Dept: URGENT CARE | Facility: CLINIC | Age: 54
End: 2024-03-24
Payer: COMMERCIAL

## 2024-03-24 VITALS
HEART RATE: 80 BPM | BODY MASS INDEX: 25.11 KG/M2 | SYSTOLIC BLOOD PRESSURE: 132 MMHG | TEMPERATURE: 99 F | RESPIRATION RATE: 18 BRPM | HEIGHT: 67 IN | DIASTOLIC BLOOD PRESSURE: 94 MMHG | OXYGEN SATURATION: 98 % | WEIGHT: 160 LBS

## 2024-03-24 DIAGNOSIS — J98.8 VIRAL RESPIRATORY ILLNESS: Primary | ICD-10-CM

## 2024-03-24 DIAGNOSIS — R05.9 COUGH, UNSPECIFIED TYPE: ICD-10-CM

## 2024-03-24 DIAGNOSIS — B97.89 VIRAL RESPIRATORY ILLNESS: Primary | ICD-10-CM

## 2024-03-24 DIAGNOSIS — J02.9 SORE THROAT: ICD-10-CM

## 2024-03-24 LAB
CTP QC/QA: YES
CTP QC/QA: YES
MOLECULAR STREP A: NEGATIVE
SARS-COV-2 AG RESP QL IA.RAPID: NEGATIVE

## 2024-03-24 PROCEDURE — 99213 OFFICE O/P EST LOW 20 MIN: CPT | Mod: S$GLB,,,

## 2024-03-24 PROCEDURE — 87651 STREP A DNA AMP PROBE: CPT | Mod: QW,,,

## 2024-03-24 PROCEDURE — 87811 SARS-COV-2 COVID19 W/OPTIC: CPT | Mod: QW,S$GLB,,

## 2024-03-24 RX ORDER — FLUTICASONE PROPIONATE 50 MCG
1 SPRAY, SUSPENSION (ML) NASAL DAILY
Qty: 9.9 ML | Refills: 0 | Status: SHIPPED | OUTPATIENT
Start: 2024-03-24

## 2024-03-24 RX ORDER — METHYLPREDNISOLONE 4 MG/1
TABLET ORAL
Qty: 21 EACH | Refills: 0 | Status: SHIPPED | OUTPATIENT
Start: 2024-03-24 | End: 2024-03-28 | Stop reason: ALTCHOICE

## 2024-03-24 RX ORDER — PROMETHAZINE HYDROCHLORIDE AND DEXTROMETHORPHAN HYDROBROMIDE 6.25; 15 MG/5ML; MG/5ML
5 SYRUP ORAL EVERY 8 HOURS PRN
Qty: 120 ML | Refills: 0 | Status: SHIPPED | OUTPATIENT
Start: 2024-03-24

## 2024-03-24 NOTE — PROGRESS NOTES
"Subjective:       Patient ID: Saima Winkler is a 53 y.o. female.    Vitals:  height is 5' 7" (1.702 m) and weight is 72.6 kg (160 lb). Her oral temperature is 98.5 °F (36.9 °C). Her blood pressure is 132/94 (abnormal) and her pulse is 80. Her respiration is 18 and oxygen saturation is 98%.     Chief Complaint: Sinus Problem (Patient reports sinus pressure, body aches, sore throat, and cough x 2 days. Patient declined covid and flu testing, but would like to be tested for strep. )    This is a 53 y.o. female who presents today with a chief complaint of  Patient presents with:  Sinus Problem: Patient reports sinus pressure, body aches, sore throat, and cough x 2 days. Patient declined covid and flu testing, but would like to be tested for strep.         Sinus Problem  This is a new problem. The current episode started in the past 7 days. The problem is unchanged. There has been no fever. Her pain is at a severity of 4/10. The pain is moderate. Associated symptoms include congestion, coughing, headaches, sinus pressure and a sore throat. Past treatments include oral decongestants. The treatment provided no relief.       Constitution: Negative.   HENT:  Positive for congestion, postnasal drip, sinus pain, sinus pressure and sore throat.    Neck: neck negative.   Cardiovascular: Negative.    Eyes: Negative.    Respiratory:  Positive for cough.    Gastrointestinal: Negative.    Endocrine: negative.   Genitourinary: Negative.    Musculoskeletal: Negative.    Skin: Negative.    Allergic/Immunologic: Negative.    Neurological:  Positive for headaches.   Hematologic/Lymphatic: Negative.    Psychiatric/Behavioral: Negative.             Objective:      Physical Exam   Constitutional: She is oriented to person, place, and time.   HENT:   Head: Normocephalic and atraumatic.   Ears:   Right Ear: Tympanic membrane, external ear and ear canal normal.   Left Ear: Tympanic membrane and ear canal normal.   Nose: Congestion present. " Right sinus exhibits maxillary sinus tenderness and frontal sinus tenderness. Left sinus exhibits maxillary sinus tenderness and frontal sinus tenderness.   Mouth/Throat: Posterior oropharyngeal erythema present.   Eyes: Conjunctivae are normal. Pupils are equal, round, and reactive to light. Extraocular movement intact   Neck: Neck supple.   Cardiovascular: Normal rate, regular rhythm, normal heart sounds and normal pulses.   Pulmonary/Chest: Effort normal and breath sounds normal.   Abdominal: Normal appearance.   Musculoskeletal: Normal range of motion.         General: Normal range of motion.   Neurological: no focal deficit. She is alert, oriented to person, place, and time and at baseline.   Skin: Skin is warm.   Psychiatric: Her behavior is normal. Mood, judgment and thought content normal.   Nursing note and vitals reviewed.        Past medical history and current medications reviewed.       Assessment:           1. Viral respiratory illness    2. Sore throat    3. Cough, unspecified type        Results for orders placed or performed in visit on 03/24/24   POCT Strep A, Molecular   Result Value Ref Range    Molecular Strep A, POC Negative Negative     Acceptable Yes    SARS Coronavirus 2 Antigen, POCT Manual Read   Result Value Ref Range    SARS Coronavirus 2 Antigen Negative Negative     Acceptable Yes           Plan:         Viral respiratory illness  -     methylPREDNISolone (MEDROL DOSEPACK) 4 mg tablet; use as directed  Dispense: 21 each; Refill: 0  -     promethazine-dextromethorphan (PROMETHAZINE-DM) 6.25-15 mg/5 mL Syrp; Take 5 mLs by mouth every 8 (eight) hours as needed (cough).  Dispense: 120 mL; Refill: 0  -     fluticasone propionate (FLONASE) 50 mcg/actuation nasal spray; 1 spray (50 mcg total) by Each Nostril route once daily.  Dispense: 9.9 mL; Refill: 0    Sore throat  -     POCT Strep A, Molecular  -     SARS Coronavirus 2 Antigen, POCT Manual Read  -      methylPREDNISolone (MEDROL DOSEPACK) 4 mg tablet; use as directed  Dispense: 21 each; Refill: 0  -     promethazine-dextromethorphan (PROMETHAZINE-DM) 6.25-15 mg/5 mL Syrp; Take 5 mLs by mouth every 8 (eight) hours as needed (cough).  Dispense: 120 mL; Refill: 0  -     fluticasone propionate (FLONASE) 50 mcg/actuation nasal spray; 1 spray (50 mcg total) by Each Nostril route once daily.  Dispense: 9.9 mL; Refill: 0    Cough, unspecified type  -     SARS Coronavirus 2 Antigen, POCT Manual Read  -     methylPREDNISolone (MEDROL DOSEPACK) 4 mg tablet; use as directed  Dispense: 21 each; Refill: 0  -     promethazine-dextromethorphan (PROMETHAZINE-DM) 6.25-15 mg/5 mL Syrp; Take 5 mLs by mouth every 8 (eight) hours as needed (cough).  Dispense: 120 mL; Refill: 0  -     fluticasone propionate (FLONASE) 50 mcg/actuation nasal spray; 1 spray (50 mcg total) by Each Nostril route once daily.  Dispense: 9.9 mL; Refill: 0             Patient Instructions   Please return here or go to the Emergency Department for any concerns or worsening of condition.  Please drink plenty of fluids.  Please get plenty of rest.  If you were prescribed antibiotics, please take them to completion.  If you were given wait & see antibiotics, please wait 5-7 days before taking them, and only take them if your symptoms have worsened or not improved.  If you do begin taking the antibiotics, please take them to completion.  If you were given a steroid shot in the clinic and have also been given a prescription for a steroid such as Prednisone or a Medrol Dose Pack, please begin taking them tomorrow.  If you do not have Hypertension or any history of palpitations, it is ok to take over the counter Sudafed or Mucinex D or Allegra-D or Claritin-D or Zyrtec-D.  If you do take one of the above, it is ok to combine that with plain over the counter Mucinex or Allegra or Claritin or Zyrtec.  If for example you are taking Zyrtec -D, you can combine that with  Mucinex, but not Mucinex-D.  If you are taking Mucinex-D, you can combine that with plain Allegra or Claritin or Zyrtec.   If you do have Hypertension or palpitations, it is safe to take Coricidin HBP for relief of sinus symptoms.  If not allergic, please take over the counter Tylenol (Acetaminophen) and/or Motrin (Ibuprofen) as directed for control of pain and/or fever.  Please follow up with your primary care doctor or specialist as needed.    If you  smoke, please stop smoking.

## 2024-03-28 ENCOUNTER — PATIENT MESSAGE (OUTPATIENT)
Dept: FAMILY MEDICINE | Facility: CLINIC | Age: 54
End: 2024-03-28

## 2024-03-28 ENCOUNTER — OFFICE VISIT (OUTPATIENT)
Dept: FAMILY MEDICINE | Facility: CLINIC | Age: 54
End: 2024-03-28
Payer: COMMERCIAL

## 2024-03-28 VITALS
TEMPERATURE: 99 F | BODY MASS INDEX: 24.48 KG/M2 | RESPIRATION RATE: 18 BRPM | SYSTOLIC BLOOD PRESSURE: 137 MMHG | DIASTOLIC BLOOD PRESSURE: 88 MMHG | HEART RATE: 91 BPM | WEIGHT: 156 LBS | HEIGHT: 67 IN

## 2024-03-28 DIAGNOSIS — M19.90 ARTHRITIS: ICD-10-CM

## 2024-03-28 DIAGNOSIS — Z96.651 STATUS POST TOTAL KNEE REPLACEMENT, RIGHT: ICD-10-CM

## 2024-03-28 DIAGNOSIS — M79.7 FIBROMYALGIA: ICD-10-CM

## 2024-03-28 DIAGNOSIS — J40 BRONCHITIS: ICD-10-CM

## 2024-03-28 DIAGNOSIS — J06.9 UPPER RESPIRATORY TRACT INFECTION, UNSPECIFIED TYPE: Primary | ICD-10-CM

## 2024-03-28 PROCEDURE — 3075F SYST BP GE 130 - 139MM HG: CPT | Mod: CPTII,S$GLB,, | Performed by: INTERNAL MEDICINE

## 2024-03-28 PROCEDURE — 3008F BODY MASS INDEX DOCD: CPT | Mod: CPTII,S$GLB,, | Performed by: INTERNAL MEDICINE

## 2024-03-28 PROCEDURE — 1160F RVW MEDS BY RX/DR IN RCRD: CPT | Mod: CPTII,S$GLB,, | Performed by: INTERNAL MEDICINE

## 2024-03-28 PROCEDURE — 99213 OFFICE O/P EST LOW 20 MIN: CPT | Mod: 25,S$GLB,, | Performed by: INTERNAL MEDICINE

## 2024-03-28 PROCEDURE — 1159F MED LIST DOCD IN RCRD: CPT | Mod: CPTII,S$GLB,, | Performed by: INTERNAL MEDICINE

## 2024-03-28 PROCEDURE — 96372 THER/PROPH/DIAG INJ SC/IM: CPT | Mod: S$GLB,,, | Performed by: INTERNAL MEDICINE

## 2024-03-28 PROCEDURE — 99999 PR PBB SHADOW E&M-EST. PATIENT-LVL III: CPT | Mod: PBBFAC,,, | Performed by: INTERNAL MEDICINE

## 2024-03-28 PROCEDURE — 3079F DIAST BP 80-89 MM HG: CPT | Mod: CPTII,S$GLB,, | Performed by: INTERNAL MEDICINE

## 2024-03-28 RX ORDER — MELOXICAM 15 MG/1
TABLET ORAL
Qty: 30 TABLET | Refills: 5 | Status: SHIPPED | OUTPATIENT
Start: 2024-03-28

## 2024-03-28 RX ORDER — PREDNISONE 20 MG/1
20 TABLET ORAL 2 TIMES DAILY
Qty: 6 TABLET | Refills: 0 | Status: SHIPPED | OUTPATIENT
Start: 2024-03-28

## 2024-03-28 RX ORDER — DEXAMETHASONE SODIUM PHOSPHATE 4 MG/ML
4 INJECTION, SOLUTION INTRA-ARTICULAR; INTRALESIONAL; INTRAMUSCULAR; INTRAVENOUS; SOFT TISSUE
Status: COMPLETED | OUTPATIENT
Start: 2024-03-28 | End: 2024-03-28

## 2024-03-28 RX ORDER — PREGABALIN 50 MG/1
CAPSULE ORAL
Qty: 90 CAPSULE | Refills: 1 | Status: SHIPPED | OUTPATIENT
Start: 2024-03-28 | End: 2024-04-01

## 2024-03-28 RX ORDER — BENZONATATE 200 MG/1
200 CAPSULE ORAL 3 TIMES DAILY PRN
Qty: 20 CAPSULE | Refills: 1 | Status: SHIPPED | OUTPATIENT
Start: 2024-03-28 | End: 2024-04-17

## 2024-03-28 RX ORDER — ALBUTEROL SULFATE 90 UG/1
2 AEROSOL, METERED RESPIRATORY (INHALATION) EVERY 6 HOURS PRN
Qty: 8 G | Refills: 1 | Status: SHIPPED | OUTPATIENT
Start: 2024-03-28 | End: 2024-04-04

## 2024-03-28 RX ADMIN — DEXAMETHASONE SODIUM PHOSPHATE 4 MG: 4 INJECTION, SOLUTION INTRA-ARTICULAR; INTRALESIONAL; INTRAMUSCULAR; INTRAVENOUS; SOFT TISSUE at 03:03

## 2024-03-28 NOTE — PROGRESS NOTES
Subjective:       Patient ID: Saima Winkler is a 53 y.o. female.    Chief Complaint: Headache, Sinus Problem, Nasal Congestion, and Cough    53-year-old female with upper respiratory symptoms, cough, congestion.  Tested negative for COVID at urgent care center.    Complains of yellowish mucus.  Headaches.  Nobody else is sick.  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    No definite fever.  Perhaps on the 1st day.    No other family member is sick.    Also complains of headaches when she coughs.  No history of head trauma.  No neck stiffness.  No rash.    She would 3 COVID vaccines in past and perhaps 3 COVID infections in past.  Difficult to make out with the present symptoms resemble previous COVID symptoms.    Cough seems to be recurrent with congestion and some wheezing.  Each of hips for brings some headaches.    Sinus Problem  This is a new problem. The current episode started in the past 7 days. The problem has been waxing and waning since onset. There has been no fever. Associated symptoms include congestion, coughing, headaches, sinus pressure and a sore throat. Pertinent negatives include no chills or shortness of breath.   Cough  This is a new problem. The current episode started in the past 7 days. The problem has been unchanged. Associated symptoms include headaches, postnasal drip, a sore throat and wheezing. Pertinent negatives include no chest pain, chills, eye redness, fever or shortness of breath.   Headache   This is a new problem. The current episode started in the past 7 days. The problem occurs constantly. The problem has been waxing and waning. The pain is located in the Bilateral region. The pain does not radiate. The pain quality is not similar to prior headaches. The quality of the pain is described as aching. Associated symptoms include coughing, sinus pressure and a sore throat. Pertinent negatives include no abdominal pain, eye redness, fever, photophobia or weakness.  "      Past Medical History:   Diagnosis Date    Anxiety     Depression      Social History     Socioeconomic History    Marital status:      Spouse name: Stacy Garcia (Ken)    Number of children: 3   Occupational History    Occupation: Registered Nurse      Employer: [employer]     Comment: Cancer Center   Tobacco Use    Smoking status: Former     Current packs/day: 0.00     Types: Cigarettes     Quit date: 2007     Years since quittin.2    Smokeless tobacco: Never   Substance and Sexual Activity    Alcohol use: Yes     Comment: Social occasional drinks    Drug use: No    Sexual activity: Yes     Partners: Male     Birth control/protection: Other-see comments     Comment: Status post hysterectomy simple.  -2004   Social History Narrative    She works at the cancer center at [employer].  She is a registered nurse.  She has 3 children age 34, 30and 24     Social Determinants of Health     Financial Resource Strain: Low Risk  (2023)    Overall Financial Resource Strain (CARDIA)     Difficulty of Paying Living Expenses: Not very hard   Food Insecurity: No Food Insecurity (2023)    Hunger Vital Sign     Worried About Running Out of Food in the Last Year: Never true     Ran Out of Food in the Last Year: Never true   Transportation Needs: No Transportation Needs (2023)    PRAPARE - Transportation     Lack of Transportation (Medical): No     Lack of Transportation (Non-Medical): No   Physical Activity: Insufficiently Active (2023)    Exercise Vital Sign     Days of Exercise per Week: 5 days     Minutes of Exercise per Session: 20 min   Stress: Stress Concern Present (2023)    [employer] - Occupational Stress Questionnaire     Feeling of Stress : To some extent   Social Connections: Socially Integrated (2023)    Social Connection and Isolation Panel [NHANES]     Frequency of Communication with Friends and Family: More " than three times a week     Frequency of Social Gatherings with Friends and Family: Twice a week     Attends [employer]al Services: More than 4 times per year     Active Member of Clubs or Organizations: Yes     Attends Club or Organization Meetings: 1 to 4 times per year     Marital Status:    Housing Stability: Low Risk  (1/30/2023)    Housing Stability Vital Sign     Unable to Pay for Housing in the Last Year: No     Number of Places Lived in the Last Year: 1     Unstable Housing in the Last Year: No     Past Surgical History:   Procedure Laterality Date    HYSTERECTOMY      KNEE SURGERY      ROBOTIC ARTHROPLASTY, KNEE Right 9/19/2022    Procedure: ROBOTIC ARTHROPLASTY, KNEE, TOTAL;  Surgeon: Eduar Whittington MD;  Location: [facility] OR;  Service: Orthopedics;  Laterality: Right;    SHOULDER SURGERY      TRANSFORAMINAL EPIDURAL INJECTION OF STEROID Bilateral 6/4/2019    Procedure: Injection,steroid,epidural,transforaminal approach L4-5;  Surgeon: Jeyson Kent MD;  Location: [facility] OR;  Service: Pain Management;  Laterality: Bilateral;    TRANSFORAMINAL EPIDURAL INJECTION OF STEROID Bilateral 7/25/2019    Procedure: Injection,steroid,epidural,transforaminal approach;  Surgeon: Jeyson Kent MD;  Location: [facility] OR;  Service: Pain Management;  Laterality: Bilateral;  L4-5    vaginal sling       Family History   Problem Relation Age of Onset    Asthma Mother     Depression Mother     Hypertension Mother     Heart disease Father         CHF    Depression Father     Hypertension Father     Rheum arthritis Sister     Psoriasis Sister     Thyroid disease Sister         Hypothyroidism    Heart disease Brother         Sick sinus syndrome status post pacemaker placement.    Obesity Brother         Morbid obesity and status post gastric sleeve surgery.    Sleep apnea Brother     No Known Problems Brother        Review of Systems   Constitutional:  Positive for activity change. Negative for chills, fatigue and fever.   HENT:  Positive  "for congestion, postnasal drip, sinus pressure and sore throat. Negative for nosebleeds and voice change.    Eyes:  Negative for photophobia, redness, itching and visual disturbance.   Respiratory:  Positive for cough and wheezing. Negative for choking and shortness of breath.    Cardiovascular:  Negative for chest pain, palpitations and leg swelling.   Gastrointestinal:  Negative for abdominal distention and abdominal pain.   Neurological:  Positive for headaches. Negative for speech difficulty and weakness.         Objective:      Blood pressure 137/88, pulse 91, temperature 98.6 °F (37 °C), resp. rate 18, height 5' 7" (1.702 m), weight 70.8 kg (156 lb). Body mass index is 24.43 kg/m².  Physical Exam  Constitutional:       Appearance: Normal appearance. She is not ill-appearing or diaphoretic.   HENT:      Nose: Congestion present.      Mouth/Throat:      Pharynx: No oropharyngeal exudate or posterior oropharyngeal erythema.   Eyes:      General: No scleral icterus.  Cardiovascular:      Rate and Rhythm: Normal rate and regular rhythm.   Pulmonary:      Effort: Pulmonary effort is normal.      Breath sounds: Normal breath sounds.      Comments: Posttussive rhonchi  Abdominal:      General: There is no distension.      Palpations: There is no mass.   Musculoskeletal:      Cervical back: No rigidity or tenderness.   Skin:     Findings: No lesion or rash.   Neurological:      Mental Status: She is alert. Mental status is at baseline.      Cranial Nerves: No cranial nerve deficit.      Motor: No weakness or tremor.      Gait: Gait normal.      Comments: No scalp tenderness.  No temporal tenderness.  No sinus tenderness.   Psychiatric:         Behavior: Behavior normal.           Assessment:       Office Visit on 03/24/2024   Component Date Value Ref Range Status    Molecular Strep A, POC 03/24/2024 Negative  Negative Final     Acceptable 03/24/2024 Yes   Final    SARS Coronavirus 2 Antigen 03/24/2024 " Negative  Negative Final     Acceptable 03/24/2024 Yes   Final   Office Visit on 01/24/2024   Component Date Value Ref Range Status    POC Molecular Influenza A Ag 01/24/2024 Positive (A)  Negative, Not Reported Final    POC Molecular Influenza B Ag 01/24/2024 Negative  Negative, Not Reported Final     Acceptable 01/24/2024 Yes   Final       1. Upper respiratory tract infection, unspecified type  -     benzonatate (TESSALON) 200 MG capsule; Take 1 capsule (200 mg total) by mouth 3 (three) times daily as needed for Cough.  Dispense: 20 capsule; Refill: 1  -     predniSONE (DELTASONE) 20 MG tablet; Take 1 tablet (20 mg total) by mouth 2 (two) times daily.  Dispense: 6 tablet; Refill: 0  -     dexAMETHasone injection 4 mg  -     albuterol (PROAIR HFA) 90 mcg/actuation inhaler; Inhale 2 puffs into the lungs every 6 (six) hours as needed for Wheezing. Rescue  Dispense: 8 g; Refill: 1    2. Bronchitis  -     albuterol (PROAIR HFA) 90 mcg/actuation inhaler; Inhale 2 puffs into the lungs every 6 (six) hours as needed for Wheezing. Rescue  Dispense: 8 g; Refill: 1    Most likely viral syndrome.  COVID test was negative.  Had 3 COVID infections in past.  He was seen COVID infection in the community endemic manner but she is 5 days post treatment.    Other possibilities could include RSV.  No known contact.    Will treat conservatively.  Avoid antibiotics at least for the next 24-48 hours unless assistant yellow mucus.    Blue salt water gargles and steam inhalation.  He was albuterol inhaler.  Prednisone for decongestion.  I have sent a message in the portal to reply back for tomorrow as to how she is doing.    Keep hydrated.      Take rest if needed from work.         Plan:   Upper respiratory tract infection, unspecified type  -     benzonatate (TESSALON) 200 MG capsule; Take 1 capsule (200 mg total) by mouth 3 (three) times daily as needed for Cough.  Dispense: 20 capsule; Refill: 1  -      predniSONE (DELTASONE) 20 MG tablet; Take 1 tablet (20 mg total) by mouth 2 (two) times daily.  Dispense: 6 tablet; Refill: 0  -     dexAMETHasone injection 4 mg  -     albuterol (PROAIR HFA) 90 mcg/actuation inhaler; Inhale 2 puffs into the lungs every 6 (six) hours as needed for Wheezing. Rescue  Dispense: 8 g; Refill: 1    Bronchitis  -     albuterol (PROAIR HFA) 90 mcg/actuation inhaler; Inhale 2 puffs into the lungs every 6 (six) hours as needed for Wheezing. Rescue  Dispense: 8 g; Refill: 1      Increase your water intake to 64-80 oz daily    Nasal Saline spray (Over the counter [place] spray or [place])  2 sprays each nostril 2-3 times a day for nasal congestion    Tylenol 500 mg 2 tablets or Ibuprofen 200 mg 2 tablets every 4-6 hours as needed for fever, headaches, sore throat, ear pain, bodyaches, and/or nasal/sinus inflammation    Warm salt water gargles with 1/2 cup water and 1 tablespoon salt every 4 hours    Warm tea with honey and lemon    Follow up with PCP in 1 week if symptoms do not resolve        Follow up in 2 months (on 5/27/2024), or if symptoms worsen or fail to improve, for Keep regular Appointment/neuropathy/arthritis.      Current Outpatient Medications:     baclofen (LIORESAL) 10 MG tablet, Take 1 tablet (10 mg total) by mouth every evening., Disp: 30 tablet, Rfl: 5    calcium carbonate/vitamin D3 (CALTRATE 600 + D ORAL), , Disp: , Rfl:     cetirizine (ZYRTEC) 10 MG tablet, Take 10 mg by mouth once daily., Disp: , Rfl:     estradioL (ESTRACE) 1 MG tablet, TAKE ONE TABLET BY MOUTH EVERY DAY THANK YOU!, Disp: 90 tablet, Rfl: 1    fluticasone propionate (FLONASE) 50 mcg/actuation nasal spray, 1 spray (50 mcg total) by Each Nostril route once daily., Disp: 9.9 mL, Rfl: 0    magnesium hydroxide 400 mg/5 ml (MILK OF MAGNESIA) 400 mg/5 mL Susp, , Disp: , Rfl:     promethazine-dextromethorphan (PROMETHAZINE-DM) 6.25-15 mg/5 mL Syrp, Take 5 mLs by mouth every 8 (eight) hours as needed (cough)., Disp: 120  mL, Rfl: 0    albuterol (PROAIR HFA) 90 mcg/actuation inhaler, Inhale 2 puffs into the lungs every 6 (six) hours as needed for Wheezing. Rescue, Disp: 8 g, Rfl: 1    benzonatate (TESSALON) 200 MG capsule, Take 1 capsule (200 mg total) by mouth 3 (three) times daily as needed for Cough., Disp: 20 capsule, Rfl: 1    meloxicam (MOBIC) 15 MG tablet, TAKE ONE TABLET BY MOUTH EVERY DAY THANK YOU!, Disp: 30 tablet, Rfl: 5    predniSONE (DELTASONE) 20 MG tablet, Take 1 tablet (20 mg total) by mouth 2 (two) times daily., Disp: 6 tablet, Rfl: 0    pregabalin (LYRICA) 50 MG capsule, TAKE 1 CAPSULE BY MOUTH THREE TIMES DAILY *THANK YOU!*, Disp: 90 capsule, Rfl: 1  No current facility-administered medications for this visit.    Gerardo Buchanan

## 2024-04-01 ENCOUNTER — OFFICE VISIT (OUTPATIENT)
Dept: RHEUMATOLOGY | Facility: CLINIC | Age: 54
End: 2024-04-01
Payer: COMMERCIAL

## 2024-04-01 DIAGNOSIS — M79.7 FIBROMYALGIA: ICD-10-CM

## 2024-04-01 DIAGNOSIS — F41.1 GENERALIZED ANXIETY DISORDER: ICD-10-CM

## 2024-04-01 DIAGNOSIS — M54.16 LUMBAR RADICULOPATHY: Primary | ICD-10-CM

## 2024-04-01 DIAGNOSIS — M53.3 SACROILIAC DYSFUNCTION: ICD-10-CM

## 2024-04-01 DIAGNOSIS — M19.90 ARTHRITIS: ICD-10-CM

## 2024-04-01 DIAGNOSIS — F33.42 RECURRENT MAJOR DEPRESSIVE DISORDER, IN FULL REMISSION: ICD-10-CM

## 2024-04-01 DIAGNOSIS — F51.01 PRIMARY INSOMNIA: ICD-10-CM

## 2024-04-01 PROCEDURE — 1160F RVW MEDS BY RX/DR IN RCRD: CPT | Mod: CPTII,95,, | Performed by: INTERNAL MEDICINE

## 2024-04-01 PROCEDURE — 1159F MED LIST DOCD IN RCRD: CPT | Mod: CPTII,95,, | Performed by: INTERNAL MEDICINE

## 2024-04-01 PROCEDURE — 99214 OFFICE O/P EST MOD 30 MIN: CPT | Mod: 95,,, | Performed by: INTERNAL MEDICINE

## 2024-04-01 RX ORDER — AZITHROMYCIN 250 MG/1
TABLET, FILM COATED ORAL
Qty: 6 TABLET | Refills: 0 | Status: SHIPPED | OUTPATIENT
Start: 2024-04-01 | End: 2024-04-06

## 2024-04-01 RX ORDER — PREGABALIN 50 MG/1
CAPSULE ORAL
Qty: 270 CAPSULE | Refills: 4 | Status: SHIPPED | OUTPATIENT
Start: 2024-04-01

## 2024-04-01 NOTE — PROGRESS NOTES
Chief Complaint   Patient presents with    Disease Management       The patient location is: home  The chief complaint leading to consultation is: fibromyalgia    Visit type: audiovisual    Face to Face time with patient: 20   minutes of total time spent on the encounter, which includes face to face time and non-face to face time preparing to see the patient (eg, review of tests), Obtaining and/or reviewing separately obtained history, Documenting clinical information in the electronic or other health record, Independently interpreting results (not separately reported) and communicating results to the patient/family/caregiver, or Care coordination (not separately reported).         Each patient to whom he or she provides medical services by telemedicine is:  (1) informed of the relationship between the physician and patient and the respective role of any other health care provider with respect to management of the patient; and (2) notified that he or she may decline to receive medical services by telemedicine and may withdraw from such care at any time.    Notes:       History of presenting illness      53 year old white female with    Fibromyalgia with flares : diagnosis given by   Every single part of her body hurts  Feet,legs,shoulders,spine hurts  On gabapentin 300 mg bid- at one point this dose was working not anymore    CCP neg  SSA,SSB neg  ESR,CRP nml  RF neg  DARRELL neg      Feet neuropathy - tingling and numbness and swelling, it burns to walk   Lumbar radiculopathy     MRI [facility] Spine 2021  Multiplanar noncontrast imaging was performed. Comparison is  made to May 2019.     There is no evidence of lumbar fracture, subluxation, or osseous  destructive lesion. Marrow signal is mildly heterogeneous. Small  vertebral body hemangiomas are noted at L1 and L3, similar to the  prior study. Signal within the conus medullaris is within normal  limits. Multilevel disc desiccation is noted.     T12-L1: No  significant abnormalities.     L1-2: Minimal broad-based disc bulging without central canal or  foraminal stenosis, unchanged.     L2-3: Mild broad-based disc bulge results in mild narrowing of the  central canal, without significant foraminal stenosis, unchanged.     L3-4: Mild broad-based disc bulge results in no significant central  canal stenosis. There is mild bilateral foraminal narrowing,  unchanged.     L4-5: There is moderate loss of intervertebral disc height. Mild  broad-based disc/osteophyte complex results in mild central canal  stenosis, unchanged. Mild facet and ligamentum flavum hypertrophy are  also noted. There is mild bilateral left-sided foraminal narrowing  without direct nerve root impingement.     L5-S1: Mild broad-based disc protrusion and small outer annular tear  are noted. There is resultant minor narrowing of the central canal. In  combination with mild facet hypertrophy, there is mild bilateral  foraminal narrowing, without direct nerve root impingement.     IMPRESSION:  1. Mild multilevel lumbar degenerative disc and facet disease as  detailed at each individual level above. There is no evidence of  high-grade spinal stenosis or direct nerve root impingement. Findings  are similar in appearance to May 8, 2019.    Sacoriliac joint dysfunction needing injections  Never had EMG/NCS or seen a neurologist    Neck surgery-in 2007, ruptured disc sitting on the spine  S/p surgery her neck does well  She has some trigger points in the upper back muscles  In the past she had pain shooting down the arms but surgery took care of this    Shoulder surgery- glenoid tear in the right shoulder due to pushing a heavy patient- she has recovered well     Right knee osteoarthritis needing Knee replacement  Right knee meniscal tear in the past needing surgery  Left knee osteoarthritis- not as severe    CMC OA-left hand needing steroid injections    Bladder suspension    Varicose veins needing saphenous  ablations    Raynaud's  Livedo     Depression    Irritable bowel syndrome  Esophageal spasms    She can fall asleep but cannot stay asleep  She takes melatonin and benadryl  She has no snoring  She doesn't wake up fresh in the mornings  Drowsy during the day    She swims,walks and 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    4/2024      She cannot tolerate SSRIs, cymbalta not an option    Lyrica 50 mg tid works great    Some URTI symptoms now that's all    Wants to see us once a year to get the lyrica refills for the entire year    Doing very well      Family history : asthma,depression,htn- mom  Heart disease,depression,htn  Mom has OA    Social history : former smoker, quit in 2007        Review of Systems   Constitutional:  Negative for fever and unexpected weight change.   HENT:  Negative for mouth sores and trouble swallowing.    Eyes:  Negative for redness.   Respiratory:  Negative for cough and shortness of breath.    Cardiovascular:  Negative for chest pain.   Gastrointestinal:  Positive for constipation and diarrhea.   Genitourinary:  Negative for dysuria and genital sores.   Skin:  Negative for rash.   Neurological:  Positive for headaches.   Hematological:  Bruises/bleeds easily.         Areas of white rash on the chest and ears- eating processed foods can do this to her  No skin rashes,malar rash,photosensitivity   No telangiectasias   No calcinosis   No psoriasis   No patchy alopecia   No oral and nasal ulcers   No dry eyes and dry mouth   No pleurisy or any cardiopulmonary complaints   No dysphagia,diplopia and dysphonia and muscle weakness   No n/v/d/c   No acid reflux+   Has raynaud's+   No digital ulcers   No cytopenias   No renal issues   No blood clots   No fever,chills,night sweats,weight loss and loss of appetite   1 miscarriage at 12th week pregnancy and then she has had healthy pregnancies  No new onset headaches   No recurrent conjunctivitis or uveitis or scleritis or episcleritis   No  chronic or bloody diarrhea with no u colitis or crohn's /inflammatory bowel disease - hemorrhoids and that can bleed  No vaginal or  urethral  d/c/STDs/no ulcers   No unexplained lymphadenopathy,parotitis   No seizures,strokes,psychosis  No sclerodactyly  No puffy hands  No perioral tightness       There is currently no information documented on the homunculus. Go to the Rheumatology activity and complete the homunculus joint exam.    Physical Exam   Constitutional: She is oriented to person, place, and time. No distress.   HENT:   Head: Normocephalic.   Mouth/Throat: Oropharynx is clear and moist.   Eyes: Pupils are equal, round, and reactive to light. Conjunctivae are normal. Right eye exhibits no discharge. Left eye exhibits no discharge. No scleral icterus.   Neck: No thyromegaly present.   Cardiovascular: Normal rate, regular rhythm and normal heart sounds.   Pulmonary/Chest: Effort normal and breath sounds normal. No stridor.   Abdominal: Soft. Bowel sounds are normal.   Musculoskeletal:         General: Normal range of motion.      Cervical back: Normal range of motion.   Lymphadenopathy:     She has no cervical adenopathy.   Neurological: She is alert and oriented to person, place, and time.   Skin: Skin is warm. No rash noted. She is not diaphoretic.   Psychiatric: Affect and judgment normal.       Widespread pain index  Note the areas which the patient has had pain over the last week:                   Shoulder-girdle, left               Shoulder-girdle, right                         Upper arm left                       Upper arm right                         Lower arm left                       Lower arm right    Hip (buttock, trochanter) left  Hip (buttock, trochanter) right                           Upper leg, left                         Upper leg, right                           Lower leg, left                         Lower leg, right                                     Jaw, left                                    Jaw, right                                        Chest                                  Abdomen                               Upper back                              Lower back                                        Neck  Score will be from 0-19: 15/19                                         Symptom severity score  Fatigue 2.5  Waking Unrefreshed  3  Cognitive Symptoms  3   0 = no problem, 1=slight or mild problem 2= moderate; considerable problems often present and/or at a moderate level, 3 = severe, pervasive, continuous, life disturbing problem  For each of the 3 symptoms, indicate the level of severity over the past week using the Scale.  The symptom severity score is the sum of the severity of the 3 symptoms (fatigue, waking unrefreshed, and cognitive symptoms) plus the number of the following symptoms occurring during the previous 6 months:   Headaches 1  Pain or cramps in the lower abdomen 1  Depression 1  The final score is between 0 and 12 :11.5/12                                          Criteria  Patient has fibromyalgia if the following 3 conditions are met:  1.  Widespread pain index greater than or equal to 7 and symptom severity score greater than or equal to 5 or widespread pain index between 3- 6, and symptom severity score greater than or equal to 9.    2.  Symptoms have been present in a similar level for at least 3 months  3.  The patient does not have a disorder that would otherwise sufficiently explain the pain      Assessment     53 year old female diagnosed to have    Fibromyalgia in 0819-4117 by a rheumatologist- when she had presented with severe pain in every single part of the body not just limited to the joints    Of note she has     Lumbar spine degenerative arthritis  Sacroiliac joint dysfunction needing injections  Herniated disc in the neck needing surgery with significant resolution of radicular symptoms in the arms  Right knee osteoarthritis following right knee  meniscal repair needing right knee replacement  Left knee osteoarthritis  Right shoulder glenoid tear needing surgery,with resolution of symptoms  b/l TMJ  Left CMC osteoarthritis    She also has symptoms of leg paresthesias - etiology of which is not known    Rheumatologic work up has been unremarkable    She has symptoms of fatigue, sleep disturbances, memory issues and brain fog with cognitive disturbances, depression, irritable bowel syndrome       Other significant symptoms and events-  Bladder suspension,due to prolapse-by having big babies  Varicose veins needing saphenous ablations  Raynaud's  Livedo   APLAS labs- negative    Today-    Widespread pain index 15/19  Symptom severity score 11.5/12    Gabapentin 300 mg bid worked only for a brief period  She cannot tolerate cymbalta,elavil      4/2024      She cannot tolerate SSRIs, cymbalta not an option    Lyrica 50 mg tid works great    Some URTI symptoms now that's all    Wants to see us once a year to get the lyrica refills for the entire year    Doing very well        1. Lumbar radiculopathy    2. Generalized anxiety disorder    3. Recurrent major depressive disorder, in full remission    4. Sacroiliac dysfunction    5. Primary insomnia    6. Fibromyalgia    7. Arthritis          Reviewed labs/xrays  Reviewed medications    Ordered labs /xrays    I have independently reviewed her imaging studies       F/u problem     Plan    Continue lyrica 50 mg tid    Water aerobics,oni chi,yoga    Therapeutic massages-help    Moderate intensity water aerobics vs land based aerobics    Sleep clinic evaluation didn't happen yet  Sleeping trouble is minimal now  She will do the sleep study eventually    Daily magnesium helped  IBS management- not needed as per patient    EMG/NCS B/L LE didn't happen since lyrica helped    Raynaud's management-prevention works  Avoid cold temperatures  Avoid rapid shifts of temperatures  Avoid cold/damp breezes  Wear layers of loose fitting  clothes over the torso,preferably wear clothes made of cotton/wool  Wear a hat and cover face/ears with a scarf  Wear good shoes and boots that fit well and dont injure the skin  Wear heavy protective socks  Wear gloves/mittens which are warm  Always set thermostat at a good temperature  Always keep a sweater or jacket  Use flannel sheets,warm blankets,electric blankets,preheated beds  Avoid touching cold water  Wear gloves to reach into freezers  Use insulated containers,gloves or napkins to hold cold drinks and food  Rinse vegetables with warm water,protect hands while washing dishes  Use disposable chemical heat packs for extra heat  Protect the skin using lotion containing lanolin on hands and feet  Wash with mild creamy soap  Examine for finger and toe tip ulcers  Nail care very important   Avoid stress  Dont smoke  If you have an episode :  Swing them around as if you are going to throw a soft ball  Warm your fingers by placing them under armpits  Wiggle your fingers and toes  Move and walk around   Run warm water until normal skin color returns    Rtc 12 months    Saima was seen today for disease management.    Diagnoses and all orders for this visit:    Lumbar radiculopathy    Generalized anxiety disorder    Recurrent major depressive disorder, in full remission    Sacroiliac dysfunction    Primary insomnia    Fibromyalgia  -     pregabalin (LYRICA) 50 MG capsule; TAKE 1 CAPSULE BY MOUTH THREE TIMES DAILY *THANK YOU!*    Arthritis  -     pregabalin (LYRICA) 50 MG capsule; TAKE 1 CAPSULE BY MOUTH THREE TIMES DAILY *THANK YOU!*    Other orders  -     azithromycin (Z-BRITNI) 250 MG tablet; Take 2 tablets by mouth on day 1; Take 1 tablet by mouth on days 2-5

## 2024-04-19 ENCOUNTER — OFFICE VISIT (OUTPATIENT)
Dept: OTOLARYNGOLOGY | Facility: CLINIC | Age: 54
End: 2024-04-19
Payer: COMMERCIAL

## 2024-04-19 VITALS — BODY MASS INDEX: 24.22 KG/M2 | WEIGHT: 154.31 LBS | HEIGHT: 67 IN

## 2024-04-19 DIAGNOSIS — H81.10 BPPV (BENIGN PAROXYSMAL POSITIONAL VERTIGO), UNSPECIFIED LATERALITY: Primary | ICD-10-CM

## 2024-04-19 PROCEDURE — 1159F MED LIST DOCD IN RCRD: CPT | Mod: S$GLB,,, | Performed by: OTOLARYNGOLOGY

## 2024-04-19 PROCEDURE — 3008F BODY MASS INDEX DOCD: CPT | Mod: S$GLB,,, | Performed by: OTOLARYNGOLOGY

## 2024-04-19 PROCEDURE — 99999 PR PBB SHADOW E&M-EST. PATIENT-LVL III: CPT | Mod: PBBFAC,,, | Performed by: OTOLARYNGOLOGY

## 2024-04-19 PROCEDURE — 1160F RVW MEDS BY RX/DR IN RCRD: CPT | Mod: S$GLB,,, | Performed by: OTOLARYNGOLOGY

## 2024-04-19 PROCEDURE — 99203 OFFICE O/P NEW LOW 30 MIN: CPT | Mod: S$GLB,,, | Performed by: OTOLARYNGOLOGY

## 2024-04-19 RX ORDER — MECLIZINE HYDROCHLORIDE 50 MG/1
25 TABLET ORAL 2 TIMES DAILY
COMMUNITY
End: 2024-04-19

## 2024-04-19 RX ORDER — MECLIZINE HCL 12.5 MG 12.5 MG/1
TABLET ORAL
Qty: 60 TABLET | Refills: 5 | Status: SHIPPED | OUTPATIENT
Start: 2024-04-19

## 2024-04-19 NOTE — PROGRESS NOTES
Subjective:       Patient ID: Saima Winkler is a 53 y.o. female.    Chief Complaint: Dizziness (Pt c/o vertigo since the end of March. /)      This patient tells me that over the years she has had multiple flare-ups of BPPV and that Dr. Rowley in [place] wants did a maneuver on her that quickly remedied it when it was then that is acute phase.  She has had a flare-up for almost a month but it is doing much better this morning and that has not as acute as it was she thinks that has a left ear problem and as she recalls rolling to the left might be a bit worse but she is going to consider this.  It is not completely resolve.          Objective:      ENT Physical Exam    Her tympanic membranes and ear canals look normal        Assessment:       1. BPPV (benign paroxysmal positional vertigo), unspecified laterality         Plan:          So I gave her home Epley maneuvers to perform we discussed the underlying logic of these she is familiar with positional vertigo in his doing a bit better and she has had flare-ups before

## 2024-06-03 ENCOUNTER — PATIENT MESSAGE (OUTPATIENT)
Dept: DERMATOLOGY | Facility: CLINIC | Age: 54
End: 2024-06-03
Payer: COMMERCIAL

## 2024-07-17 DIAGNOSIS — M79.7 FIBROMYALGIA: ICD-10-CM

## 2024-07-17 DIAGNOSIS — M62.838 MUSCLE SPASM: ICD-10-CM

## 2024-07-18 RX ORDER — BACLOFEN 10 MG/1
TABLET ORAL
Qty: 30 TABLET | Refills: 5 | Status: SHIPPED | OUTPATIENT
Start: 2024-07-18

## 2024-07-22 DIAGNOSIS — J40 BRONCHITIS: ICD-10-CM

## 2024-07-22 DIAGNOSIS — J06.9 UPPER RESPIRATORY TRACT INFECTION, UNSPECIFIED TYPE: ICD-10-CM

## 2024-07-22 RX ORDER — ALBUTEROL SULFATE 90 UG/1
2 AEROSOL, METERED RESPIRATORY (INHALATION) EVERY 6 HOURS PRN
Qty: 8 G | Refills: 1 | Status: SHIPPED | OUTPATIENT
Start: 2024-07-22 | End: 2024-07-29

## 2024-08-28 DIAGNOSIS — Z12.31 OTHER SCREENING MAMMOGRAM: ICD-10-CM

## 2024-09-04 ENCOUNTER — HOSPITAL ENCOUNTER (OUTPATIENT)
Dept: RADIOLOGY | Facility: HOSPITAL | Age: 54
Discharge: HOME | End: 2024-09-04
Attending: INTERNAL MEDICINE
Payer: COMMERCIAL

## 2024-09-04 DIAGNOSIS — Z12.31 OTHER SCREENING MAMMOGRAM: ICD-10-CM

## 2024-09-04 PROCEDURE — 77067 SCR MAMMO BI INCL CAD: CPT | Mod: 26,,, | Performed by: RADIOLOGY

## 2024-09-04 PROCEDURE — 77063 BREAST TOMOSYNTHESIS BI: CPT | Mod: 26,,, | Performed by: RADIOLOGY

## 2024-09-04 PROCEDURE — 77067 SCR MAMMO BI INCL CAD: CPT | Mod: TC,PO

## 2024-09-09 DIAGNOSIS — N95.1 HOT FLASHES DUE TO MENOPAUSE: Chronic | ICD-10-CM

## 2024-09-09 DIAGNOSIS — Z96.651 STATUS POST TOTAL KNEE REPLACEMENT, RIGHT: ICD-10-CM

## 2024-09-09 DIAGNOSIS — M19.90 ARTHRITIS: ICD-10-CM

## 2024-09-09 RX ORDER — MELOXICAM 15 MG/1
TABLET ORAL
Qty: 30 TABLET | Refills: 5 | Status: SHIPPED | OUTPATIENT
Start: 2024-09-09

## 2024-09-09 RX ORDER — ESTRADIOL 1 MG/1
TABLET ORAL
Qty: 90 TABLET | Refills: 1 | Status: SHIPPED | OUTPATIENT
Start: 2024-09-09

## 2024-10-02 ENCOUNTER — TELEPHONE (OUTPATIENT)
Dept: ORTHOPEDICS | Facility: CLINIC | Age: 54
End: 2024-10-02
Payer: COMMERCIAL

## 2024-10-03 ENCOUNTER — TELEPHONE (OUTPATIENT)
Dept: ORTHOPEDICS | Facility: CLINIC | Age: 54
End: 2024-10-03
Payer: COMMERCIAL

## 2024-10-03 NOTE — TELEPHONE ENCOUNTER
----- Message from Nurse Hanley sent at 10/2/2024  2:36 PM CDT -----    ----- Message -----  From: Fco Lynch  Sent: 10/2/2024   2:30 PM CDT  To: [facility] Staff    Consult/Advisory    Name Of Caller:Saima       Contact Preference: [phone]    Nature of call: Pt had a msg regarding a appt for neck pain that runs down to her shoulder to her arm no appts showing avail please call to assist

## 2024-10-07 RX ORDER — SODIUM CHLORIDE, SODIUM LACTATE, POTASSIUM CHLORIDE, CALCIUM CHLORIDE 600; 310; 30; 20 MG/100ML; MG/100ML; MG/100ML; MG/100ML
INJECTION, SOLUTION INTRAVENOUS CONTINUOUS
OUTPATIENT
Start: 2024-10-07

## 2024-10-08 ENCOUNTER — HOSPITAL ENCOUNTER (OUTPATIENT)
Facility: HOSPITAL | Age: 54
Discharge: HOME | End: 2024-10-08
Attending: ANESTHESIOLOGY | Admitting: ANESTHESIOLOGY
Payer: COMMERCIAL

## 2024-10-08 VITALS
OXYGEN SATURATION: 100 % | SYSTOLIC BLOOD PRESSURE: 137 MMHG | HEIGHT: 67 IN | BODY MASS INDEX: 24.33 KG/M2 | HEART RATE: 70 BPM | WEIGHT: 155 LBS | TEMPERATURE: 97 F | DIASTOLIC BLOOD PRESSURE: 93 MMHG | RESPIRATION RATE: 16 BRPM

## 2024-10-08 DIAGNOSIS — M53.3 SACROILIAC DYSFUNCTION: Primary | ICD-10-CM

## 2024-10-08 DIAGNOSIS — M53.3 SACROILIAC JOINT DYSFUNCTION: ICD-10-CM

## 2024-10-08 PROCEDURE — 63600175 PHARM REV CODE 636 W HCPCS: Mod: JZ,JG | Performed by: ANESTHESIOLOGY

## 2024-10-08 PROCEDURE — 27096 INJECT SACROILIAC JOINT: CPT | Mod: 50 | Performed by: ANESTHESIOLOGY

## 2024-10-08 PROCEDURE — 25500020 PHARM REV CODE 255: Performed by: ANESTHESIOLOGY

## 2024-10-08 RX ORDER — BUPIVACAINE HYDROCHLORIDE 5 MG/ML
INJECTION, SOLUTION EPIDURAL; INTRACAUDAL
Status: DISCONTINUED | OUTPATIENT
Start: 2024-10-08 | End: 2024-10-08 | Stop reason: HOSPADM

## 2024-10-08 RX ORDER — LIDOCAINE HYDROCHLORIDE 10 MG/ML
INJECTION, SOLUTION INFILTRATION; PERINEURAL
Status: DISCONTINUED | OUTPATIENT
Start: 2024-10-08 | End: 2024-10-08 | Stop reason: HOSPADM

## 2024-10-08 RX ORDER — METHYLPREDNISOLONE ACETATE 80 MG/ML
INJECTION, SUSPENSION INTRA-ARTICULAR; INTRALESIONAL; INTRAMUSCULAR; SOFT TISSUE
Status: DISCONTINUED | OUTPATIENT
Start: 2024-10-08 | End: 2024-10-08 | Stop reason: HOSPADM

## 2024-10-08 NOTE — OP NOTE
Procedure Date: 10/8/2024    PROCEDURE:  Bilateral  sacroiliac joint injection utilizing fluoroscopy.    DIAGNOSIS: Bilateral sided sacroiliitis.  Post op diagnosis: same    PHYSICIAN: Payton Aguillon MD    MEDICATIONS INJECTED:  Methylprednisone 40 mg and 2 ml Bupivacaine 0.5% injected into each joint    LOCAL ANESTHETIC USED: Lidocaine 1%,6 ml total.     SEDATION MEDICATIONS: N/A    ESTIMATED BLOOD LOSS: none    COMPLICATIONS: none    TECHNIQUE:   Time-out taken to identify patient and procedure side prior to starting the procedure. After placing the patient in the prone position, the patient was prepped and draped in the usual sterile fashion using ChloraPrep and sterile towels.  The area was determined under fluoroscopy in the AP view.  Lidocaine was injected by raising a wheal and going down to the periosteum using a 25-gauge 1.5 inch needle.  The 3.5 inch 22-gauge spinal needle was introduced into inferior opening of the bilateral sacroiliac joints.  Negative pressure applied to confirm no intravascular placement.  4 ml of contrast dye was injected to confirm placement and to confirm that there was no vascular uptake. The medication was then injected slowly. The patient tolerated the procedure well.    The patient was monitored after the procedure.  The patient was given post procedure and discharge instructions to follow at home. The patient was discharged in a stable condition

## 2024-10-08 NOTE — DISCHARGE SUMMARY
[facility]  Discharge Note  Short Stay    Procedure(s) (LRB):  SI JOINT INJECTION BILATERAL (Bilateral)      OUTCOME: Patient tolerated treatment/procedure well without complication and is now ready for discharge.    DISPOSITION: Home or Self Care    FINAL DIAGNOSIS: Sacroiliac joint dysfunction    FOLLOWUP: In clinic    DISCHARGE INSTRUCTIONS:    Discharge Procedure Orders   Diet general     Call MD for:  temperature >100.4     Call MD for:  persistent nausea and vomiting     Call MD for:  severe uncontrolled pain     Call MD for:  difficulty breathing, headache or visual disturbances     Call MD for:  redness, tenderness, or signs of infection (pain, swelling, redness, odor or green/yellow discharge around incision site)     Call MD for:  hives     Call MD for:  persistent dizziness or light-headedness     Call MD for:  extreme fatigue        TIME SPENT ON DISCHARGE: 30 minutes

## 2024-10-08 NOTE — PLAN OF CARE
Discharge instructions given to patient, she verbalized understanding and voiced no questions or concerns.  Her  also had a procedure, so she is driving him home. Waiting on him to be discharged.

## 2024-10-08 NOTE — PLAN OF CARE
1040 Received patient from procedure room.  Report received, placed patient on monitors, VSS.  Will continue to monitor patient per protocol until discharge.

## 2024-10-08 NOTE — H&P
"FOLLOW UP NOTE:     CHIEF COMPLAINT: back pain    INTERVAL HISTORY OF PRESENT ILLNESS: Saima Winkler is a 54 y.o. female who presents for SI JOINT INJECTION BILATERAL . The patient denies of any significant changes in her health since her last appointment. The patient also denies of any changes in the character of her pain since her last appointment.     ROS:  Review of Systems   Constitutional:  Negative for chills and fever.   HENT:  Negative for sore throat.    Eyes:  Negative for visual disturbance.   Respiratory:  Negative for shortness of breath.    Cardiovascular:  Negative for chest pain.   Gastrointestinal:  Negative for nausea and vomiting.   Genitourinary:  Negative for difficulty urinating.   Musculoskeletal:  Positive for back pain.   Skin:  Negative for rash.   Allergic/Immunologic: Negative for immunocompromised state.   Neurological:  Negative for syncope.   Hematological:  Does not bruise/bleed easily.   Psychiatric/Behavioral:  Negative for suicidal ideas.         MEDICAL, SURGICAL, FAMILY, SOCIAL HX: reviewed    MEDICATIONS/ALLERGIES: reviewed    PHYSICAL EXAM:    VITALS: Vitals reviewed.   Vitals:    10/08/24 0936   BP: 131/83   Pulse: 62   Resp: 16   Temp: 97.9 °F (36.6 °C)   TempSrc: Temporal   SpO2: 100%   Weight: 70.3 kg (155 lb)   Height: 5' 7" (1.702 m)       Physical Exam  Vitals and nursing note reviewed.   Constitutional:       Appearance: Normal appearance. She is not toxic-appearing or diaphoretic.   HENT:      Head: Normocephalic and atraumatic.   Eyes:      General:         Right eye: No discharge.         Left eye: No discharge.      Extraocular Movements: Extraocular movements intact.      Conjunctiva/sclera: Conjunctivae normal.   Cardiovascular:      Rate and Rhythm: Normal rate.   Pulmonary:      Effort: Pulmonary effort is normal. No respiratory distress.      Breath sounds: Normal breath sounds.   Abdominal:      Palpations: Abdomen is soft.   Skin:     General: Skin is " warm and dry.      Findings: No rash.   Neurological:      Mental Status: She is alert and oriented to person, place, and time.   Psychiatric:         Mood and Affect: Mood and affect normal.         Cognition and Memory: Memory normal.         Judgment: Judgment normal.          EXTREMITIES:    Gen: No cyanosis, edema, varicosities, or tenderness to palpation BLE   Skin: Warm, pink, dry, no rashes, no lesions BLE   Strength: 5/5 motor strength BLE   ROM: hips, knees and ankles without pain or instability.     NEUROLOGICAL:    Gen: No clonus or spasticity.   Gait: Normal without antalgic lean   DTR's: 2+ in bilateral patellar, and ankle   BABINSKI: Absent bilaterally  Sensory: Intact to light touch and proprioception BLE    ASSESSMENT: Saima Winkler is a 54 y.o. female who presents for SI JOINT INJECTION BILATERAL .     PLAN:  Proceed with SI JOINT INJECTION BILATERAL  as previously discussed.    This patient has been cleared for surgery in an ambulatory surgical facility    ASA 3,  Mallampatti Score 3  No history of anesthetic complications  Plan for RN IV sedation    Payton Aguillon MD  Pain Management

## 2024-10-24 DIAGNOSIS — M19.90 ARTHRITIS: ICD-10-CM

## 2024-10-24 DIAGNOSIS — M79.7 FIBROMYALGIA: ICD-10-CM

## 2024-10-24 RX ORDER — PREGABALIN 50 MG/1
CAPSULE ORAL
Qty: 270 CAPSULE | Refills: 1 | Status: SHIPPED | OUTPATIENT
Start: 2024-10-24

## 2024-10-24 NOTE — TELEPHONE ENCOUNTER
Medication refill requested for pregabalin 50 mg.  Last seen by Dr Chandler on 4/1/24 and due for another evaluation 4/25.

## 2024-12-17 ENCOUNTER — HOSPITAL ENCOUNTER (OUTPATIENT)
Facility: HOSPITAL | Age: 54
Discharge: HOME | End: 2024-12-17
Attending: ANESTHESIOLOGY | Admitting: ANESTHESIOLOGY
Payer: COMMERCIAL

## 2024-12-17 VITALS
WEIGHT: 154.31 LBS | RESPIRATION RATE: 12 BRPM | HEART RATE: 65 BPM | DIASTOLIC BLOOD PRESSURE: 83 MMHG | TEMPERATURE: 97 F | OXYGEN SATURATION: 96 % | HEIGHT: 67 IN | SYSTOLIC BLOOD PRESSURE: 116 MMHG | BODY MASS INDEX: 24.22 KG/M2

## 2024-12-17 DIAGNOSIS — M47.896 OTHER SPONDYLOSIS, LUMBAR REGION: Primary | ICD-10-CM

## 2024-12-17 PROCEDURE — 64493 INJ PARAVERT F JNT L/S 1 LEV: CPT | Mod: 50 | Performed by: ANESTHESIOLOGY

## 2024-12-17 PROCEDURE — 64494 INJ PARAVERT F JNT L/S 2 LEV: CPT | Mod: 50 | Performed by: ANESTHESIOLOGY

## 2024-12-17 PROCEDURE — 63600175 PHARM REV CODE 636 W HCPCS: Performed by: ANESTHESIOLOGY

## 2024-12-17 RX ORDER — BUPIVACAINE HYDROCHLORIDE 5 MG/ML
INJECTION, SOLUTION EPIDURAL; INTRACAUDAL
Status: DISCONTINUED | OUTPATIENT
Start: 2024-12-17 | End: 2024-12-17 | Stop reason: HOSPADM

## 2024-12-17 RX ORDER — MIDAZOLAM HYDROCHLORIDE 1 MG/ML
INJECTION INTRAMUSCULAR; INTRAVENOUS
Status: DISCONTINUED | OUTPATIENT
Start: 2024-12-17 | End: 2024-12-17 | Stop reason: HOSPADM

## 2024-12-17 RX ORDER — LIDOCAINE HYDROCHLORIDE 10 MG/ML
INJECTION, SOLUTION INFILTRATION; PERINEURAL
Status: DISCONTINUED | OUTPATIENT
Start: 2024-12-17 | End: 2024-12-17 | Stop reason: HOSPADM

## 2024-12-17 RX ORDER — SODIUM CHLORIDE, SODIUM LACTATE, POTASSIUM CHLORIDE, CALCIUM CHLORIDE 600; 310; 30; 20 MG/100ML; MG/100ML; MG/100ML; MG/100ML
INJECTION, SOLUTION INTRAVENOUS CONTINUOUS
Status: DISCONTINUED | OUTPATIENT
Start: 2024-12-17 | End: 2024-12-17 | Stop reason: HOSPADM

## 2024-12-17 NOTE — DISCHARGE SUMMARY
[facility] - [facility]  Discharge Note  Short Stay    Procedure(s) (LRB):  LUMBAR MBB BILATERAL L3, L4, L5 (Bilateral)  INJECTION, FACET JOINT, LUMBOSACRAL, 2ND LEVEL (Bilateral)      OUTCOME: Patient tolerated treatment/procedure well without complication and is now ready for discharge.    DISPOSITION: Home or Self Care    FINAL DIAGNOSIS: Other spondylosis, lumbar    FOLLOWUP: In clinic    DISCHARGE INSTRUCTIONS:    Discharge Procedure Orders   Diet general     Call MD for:  temperature >100.4     Call MD for:  persistent nausea and vomiting     Call MD for:  severe uncontrolled pain     Call MD for:  difficulty breathing, headache or visual disturbances     Call MD for:  redness, tenderness, or signs of infection (pain, swelling, redness, odor or green/yellow discharge around incision site)     Call MD for:  hives     Call MD for:  persistent dizziness or light-headedness     Call MD for:  extreme fatigue        TIME SPENT ON DISCHARGE: 30 minutes

## 2024-12-17 NOTE — H&P
"FOLLOW UP NOTE:     CHIEF COMPLAINT: back and hip pain    INTERVAL HISTORY OF PRESENT ILLNESS: Saima Winkler is a 54 y.o. female who presents for LUMBAR MBB BILATERAL L3, L4, L5 . The patient denies of any significant changes in her health since her last appointment. The patient also denies of any changes in the character of her pain since her last appointment.     ROS:  Review of Systems   Constitutional:  Negative for chills and fever.   HENT:  Negative for sore throat.    Eyes:  Negative for visual disturbance.   Respiratory:  Negative for shortness of breath.    Cardiovascular:  Negative for chest pain.   Gastrointestinal:  Negative for nausea and vomiting.   Genitourinary:  Negative for difficulty urinating.   Musculoskeletal:  Positive for arthralgias and back pain.   Skin:  Negative for rash.   Allergic/Immunologic: Negative for immunocompromised state.   Neurological:  Negative for syncope.   Hematological:  Does not bruise/bleed easily.   Psychiatric/Behavioral:  Negative for suicidal ideas.         MEDICAL, SURGICAL, FAMILY, SOCIAL HX: reviewed    MEDICATIONS/ALLERGIES: reviewed    PHYSICAL EXAM:    VITALS: Vitals reviewed.   Vitals:    12/17/24 0943   BP: 130/83   Pulse: 79   Resp: 16   Temp: 97.8 °F (36.6 °C)   TempSrc: Temporal   SpO2: 99%   Weight: 70 kg (154 lb 5.2 oz)   Height: 5' 7" (1.702 m)       Physical Exam  Vitals and nursing note reviewed.   Constitutional:       Appearance: Normal appearance. She is not toxic-appearing or diaphoretic.   HENT:      Head: Normocephalic and atraumatic.   Eyes:      General:         Right eye: No discharge.         Left eye: No discharge.      Extraocular Movements: Extraocular movements intact.      Conjunctiva/sclera: Conjunctivae normal.   Cardiovascular:      Rate and Rhythm: Normal rate.   Pulmonary:      Effort: Pulmonary effort is normal. No respiratory distress.      Breath sounds: Normal breath sounds.   Abdominal:      Palpations: Abdomen is soft. "   Skin:     General: Skin is warm and dry.      Findings: No rash.   Neurological:      Mental Status: She is alert and oriented to person, place, and time.   Psychiatric:         Mood and Affect: Mood and affect normal.         Cognition and Memory: Memory normal.         Judgment: Judgment normal.          EXTREMITIES:    Gen: No cyanosis, edema, varicosities, or tenderness to palpation BLE   Skin: Warm, pink, dry, no rashes, no lesions BLE   Strength: 5/5 motor strength BLE   ROM: hips, knees and ankles without pain or instability.     NEUROLOGICAL:    Gen: No clonus or spasticity.   Gait: Normal without antalgic lean   DTR's: 2+ in bilateral patellar, and ankle   BABINSKI: Absent bilaterally  Sensory: Intact to light touch and proprioception BLE    ASSESSMENT: Saima Winkler is a 54 y.o. female who presents for LUMBAR MBB BILATERAL L3, L4, L5 .     PLAN:  Proceed with LUMBAR MBB BILATERAL L3, L4, L5  as previously discussed.    This patient has been cleared for surgery in an ambulatory surgical facility    ASA 3,  Mallampatti Score 3  No history of anesthetic complications  Plan for RN IV sedation    Payton Aguillon MD  Pain Management

## 2024-12-17 NOTE — OP NOTE
PROCEDURE DATE: 12/17/2024    PROCEDURE: Bilateral L3, L4, and L5 medial branch nerve blocks    DIAGNOSIS:  Other lumbar spondylosis    Post Op diagnosis: Same    PHYSICIAN: Payton Aguillon MD    MEDICATIONS INJECTED: 0.5% bupivicaine, 0.5 ml at each level    SEDATION MEDICATIONS: RN IV sedation    LOCAL ANESTHETIC USED: 1% lidocaine, 2 mL used at each level    ESTIMATED BLOOD LOSS:  none    COMPLICATIONS:  none    TECHNIQUE: A time out was taken to identify the patient, procedure and side of the procedure. The patient was placed in a prone position, then prepped and draped in the usual sterile fashion using ChloraPrep and sterile towels.  The levels were determined under fluoroscopic guidance and then marked.  A 25-gauge 3.5 inch needle was introduced to the anatomic location of the L3, L4, and L5 medial branch nerves on the bilateral side. The above medication was then injected. The patient tolerated the procedure well.     The patient was monitored after the procedure. Patient was given pain diary to record pain levels at home.     If found to have greater than a 50% recovery and so will be scheduled for a radiofrequency ablation of the corresponding nerves.  Patient was given post procedure and discharge instructions to follow at home.  The patient was discharged in a stable condition.

## 2025-01-05 DIAGNOSIS — M62.838 MUSCLE SPASM: ICD-10-CM

## 2025-01-05 DIAGNOSIS — M79.7 FIBROMYALGIA: ICD-10-CM

## 2025-01-06 RX ORDER — BACLOFEN 10 MG/1
TABLET ORAL
Qty: 30 TABLET | Refills: 5 | Status: SHIPPED | OUTPATIENT
Start: 2025-01-06

## 2025-01-29 RX ORDER — SODIUM CHLORIDE, SODIUM LACTATE, POTASSIUM CHLORIDE, CALCIUM CHLORIDE 600; 310; 30; 20 MG/100ML; MG/100ML; MG/100ML; MG/100ML
INJECTION, SOLUTION INTRAVENOUS CONTINUOUS
OUTPATIENT
Start: 2025-01-29

## 2025-02-04 ENCOUNTER — HOSPITAL ENCOUNTER (OUTPATIENT)
Facility: HOSPITAL | Age: 55
Discharge: HOME | End: 2025-02-04
Attending: ANESTHESIOLOGY | Admitting: ANESTHESIOLOGY
Payer: COMMERCIAL

## 2025-02-04 VITALS
HEIGHT: 67 IN | DIASTOLIC BLOOD PRESSURE: 90 MMHG | RESPIRATION RATE: 17 BRPM | WEIGHT: 154 LBS | SYSTOLIC BLOOD PRESSURE: 128 MMHG | HEART RATE: 72 BPM | TEMPERATURE: 98 F | OXYGEN SATURATION: 99 % | BODY MASS INDEX: 24.17 KG/M2

## 2025-02-04 DIAGNOSIS — M47.896 OTHER SPONDYLOSIS, LUMBAR REGION: Primary | ICD-10-CM

## 2025-02-04 PROCEDURE — 63600175 PHARM REV CODE 636 W HCPCS: Performed by: ANESTHESIOLOGY

## 2025-02-04 PROCEDURE — 64493 INJ PARAVERT F JNT L/S 1 LEV: CPT | Mod: 50 | Performed by: ANESTHESIOLOGY

## 2025-02-04 PROCEDURE — 64494 INJ PARAVERT F JNT L/S 2 LEV: CPT | Mod: 50 | Performed by: ANESTHESIOLOGY

## 2025-02-04 RX ORDER — LIDOCAINE HYDROCHLORIDE 10 MG/ML
INJECTION, SOLUTION INFILTRATION; PERINEURAL
Status: DISCONTINUED | OUTPATIENT
Start: 2025-02-04 | End: 2025-02-04 | Stop reason: HOSPADM

## 2025-02-04 RX ORDER — MIDAZOLAM HYDROCHLORIDE 5 MG/ML
INJECTION INTRAMUSCULAR; INTRAVENOUS
Status: DISCONTINUED | OUTPATIENT
Start: 2025-02-04 | End: 2025-02-04 | Stop reason: HOSPADM

## 2025-02-04 RX ORDER — BUPIVACAINE HYDROCHLORIDE 5 MG/ML
INJECTION, SOLUTION EPIDURAL; INTRACAUDAL
Status: DISCONTINUED | OUTPATIENT
Start: 2025-02-04 | End: 2025-02-04 | Stop reason: HOSPADM

## 2025-02-04 NOTE — DISCHARGE INSTRUCTIONS
Dr. Aguillon [phone] extension [phone]  [facility] [phone]  [facility] [phone]      You may remove the dressing tomorrow.  You may shower after the dressing is removed.  Do not submerge in any water for the next 48 hours. (Bath, swimming, hot tub etc.)

## 2025-02-04 NOTE — H&P
"FOLLOW UP NOTE:     CHIEF COMPLAINT: back and hip area pain    INTERVAL HISTORY OF PRESENT ILLNESS: Saima Winkler is a 54 y.o. female who presents for LUMBAR MBB BILATERAL L3, L4, L5 . The patient denies of any significant changes in her health since her last appointment. The patient also denies of any changes in the character of her pain since her last appointment.     ROS:  Review of Systems   Constitutional:  Negative for chills and fever.   HENT:  Negative for sore throat.    Eyes:  Negative for visual disturbance.   Respiratory:  Negative for shortness of breath.    Cardiovascular:  Negative for chest pain.   Gastrointestinal:  Negative for nausea and vomiting.   Genitourinary:  Negative for difficulty urinating.   Musculoskeletal:  Positive for arthralgias and back pain.   Skin:  Negative for rash.   Allergic/Immunologic: Negative for immunocompromised state.   Neurological:  Negative for syncope.   Hematological:  Does not bruise/bleed easily.   Psychiatric/Behavioral:  Negative for suicidal ideas.         MEDICAL, SURGICAL, FAMILY, SOCIAL HX: reviewed    MEDICATIONS/ALLERGIES: reviewed    PHYSICAL EXAM:    VITALS: Vitals reviewed.   Vitals:    02/04/25 1050   BP: (!) 146/88   Pulse: 70   Resp: 18   Temp: 97.2 °F (36.2 °C)   TempSrc: Temporal   SpO2: 96%   Weight: 69.9 kg (154 lb)   Height: 5' 7" (1.702 m)       Physical Exam  Vitals and nursing note reviewed.   Constitutional:       Appearance: Normal appearance. She is not toxic-appearing or diaphoretic.   HENT:      Head: Normocephalic and atraumatic.   Eyes:      General:         Right eye: No discharge.         Left eye: No discharge.      Extraocular Movements: Extraocular movements intact.      Conjunctiva/sclera: Conjunctivae normal.   Cardiovascular:      Rate and Rhythm: Normal rate.   Pulmonary:      Effort: Pulmonary effort is normal. No respiratory distress.      Breath sounds: Normal breath sounds.   Abdominal:      Palpations: Abdomen is " soft.   Skin:     General: Skin is warm and dry.      Findings: No rash.   Neurological:      Mental Status: She is alert and oriented to person, place, and time.   Psychiatric:         Mood and Affect: Mood and affect normal.         Cognition and Memory: Memory normal.         Judgment: Judgment normal.          EXTREMITIES:    Gen: No cyanosis, edema, varicosities, or tenderness to palpation BLE   Skin: Warm, pink, dry, no rashes, no lesions BLE   Strength: 5/5 motor strength BLE   ROM: hips, knees and ankles without pain or instability.     NEUROLOGICAL:    Gen: No clonus or spasticity.   Gait: Normal without antalgic lean   DTR's: 2+ in bilateral patellar, and ankle   BABINSKI: Absent bilaterally  Sensory: Intact to light touch and proprioception BLE    ASSESSMENT: Saima Winkler is a 54 y.o. female who presents for LUMBAR MBB BILATERAL L3, L4, L5 .     PLAN:  Proceed with LUMBAR MBB BILATERAL L3, L4, L5  as previously discussed.    This patient has been cleared for surgery in an ambulatory surgical facility    ASA 3,  Mallampatti Score 3  No history of anesthetic complications  Plan for RN IV sedation    Payton Aguillon MD  Pain Management

## 2025-02-04 NOTE — DISCHARGE SUMMARY
[facility] - Surgery  Discharge Note  Short Stay    Procedure(s) (LRB):  LUMBAR MBB BILATERAL L3, L4, L5 (Bilateral)  INJECTION, FACET JOINT, LUMBOSACRAL, 2ND LEVEL (Bilateral)      OUTCOME: Patient tolerated treatment/procedure well without complication and is now ready for discharge.    DISPOSITION: Home or Self Care    FINAL DIAGNOSIS: Other spondylosis, lumbar    FOLLOWUP: In clinic    DISCHARGE INSTRUCTIONS:    Discharge Procedure Orders   Diet general     Call MD for:  temperature >100.4     Call MD for:  persistent nausea and vomiting     Call MD for:  severe uncontrolled pain     Call MD for:  difficulty breathing, headache or visual disturbances     Call MD for:  redness, tenderness, or signs of infection (pain, swelling, redness, odor or green/yellow discharge around incision site)     Call MD for:  hives     Call MD for:  persistent dizziness or light-headedness     Call MD for:  extreme fatigue        TIME SPENT ON DISCHARGE: 30 minutes

## 2025-02-04 NOTE — PLAN OF CARE
IV dc'd with  jelco tip intact. No noted problems to site. Pressure dsg applied. Tolerated well.   Discharge instructions reviewed with pts family. V/u. All questions answered. Copy of discharge paperwork provided.

## 2025-02-04 NOTE — OP NOTE
PROCEDURE DATE: 2/4/2025    PROCEDURE:  Bilateral L3, L4, and L5 medial branch nerve blocks    DIAGNOSIS:  Other lumbar spondylosis    Post Op diagnosis: Same    PHYSICIAN: Payton Aguillon MD    MEDICATIONS INJECTED: 0.5% bupivicaine, 0.5 ml at each level    SEDATION MEDICATIONS: RN IV sedation    LOCAL ANESTHETIC USED: 1% lidocaine, 2 mL used at each level    ESTIMATED BLOOD LOSS:  none    COMPLICATIONS:  none    TECHNIQUE: A time out was taken to identify the patient, procedure and side of the procedure. The patient was placed in a prone position, then prepped and draped in the usual sterile fashion using ChloraPrep and sterile towels.  The levels were determined under fluoroscopic guidance and then marked.  A 25-gauge 3.5 inch needle was introduced to the anatomic location of the L3, L4, and L5 medial branch nerves on the bilateral side. The above medication was then injected. The patient tolerated the procedure well.     The patient was monitored after the procedure. Patient was given pain diary to record pain levels at home.     If found to have greater than a 50% recovery and so will be scheduled for a radiofrequency ablation of the corresponding nerves.  Patient was given post procedure and discharge instructions to follow at home.  The patient was discharged in a stable condition.

## 2025-02-10 ENCOUNTER — PATIENT MESSAGE (OUTPATIENT)
Dept: FAMILY MEDICINE | Facility: CLINIC | Age: 55
End: 2025-02-10
Payer: COMMERCIAL

## 2025-02-18 ENCOUNTER — PATIENT MESSAGE (OUTPATIENT)
Dept: FAMILY MEDICINE | Facility: CLINIC | Age: 55
End: 2025-02-18

## 2025-02-18 ENCOUNTER — HOSPITAL ENCOUNTER (OUTPATIENT)
Facility: HOSPITAL | Age: 55
Discharge: HOME | End: 2025-02-18
Attending: ANESTHESIOLOGY | Admitting: ANESTHESIOLOGY
Payer: COMMERCIAL

## 2025-02-18 ENCOUNTER — OFFICE VISIT (OUTPATIENT)
Dept: FAMILY MEDICINE | Facility: CLINIC | Age: 55
End: 2025-02-18
Payer: COMMERCIAL

## 2025-02-18 VITALS
OXYGEN SATURATION: 100 % | SYSTOLIC BLOOD PRESSURE: 110 MMHG | DIASTOLIC BLOOD PRESSURE: 64 MMHG | HEIGHT: 67 IN | WEIGHT: 152.13 LBS | BODY MASS INDEX: 23.88 KG/M2 | TEMPERATURE: 98 F | HEART RATE: 78 BPM | RESPIRATION RATE: 10 BRPM

## 2025-02-18 VITALS
DIASTOLIC BLOOD PRESSURE: 78 MMHG | SYSTOLIC BLOOD PRESSURE: 139 MMHG | BODY MASS INDEX: 24.33 KG/M2 | HEART RATE: 75 BPM | HEIGHT: 67 IN | WEIGHT: 155 LBS

## 2025-02-18 DIAGNOSIS — F41.1 GENERALIZED ANXIETY DISORDER: Primary | ICD-10-CM

## 2025-02-18 DIAGNOSIS — F33.42 RECURRENT MAJOR DEPRESSIVE DISORDER, IN FULL REMISSION: ICD-10-CM

## 2025-02-18 DIAGNOSIS — M47.896 OTHER SPONDYLOSIS, LUMBAR REGION: Primary | ICD-10-CM

## 2025-02-18 PROCEDURE — 99152 MOD SED SAME PHYS/QHP 5/>YRS: CPT | Performed by: ANESTHESIOLOGY

## 2025-02-18 PROCEDURE — 64636 DESTROY L/S FACET JNT ADDL: CPT | Mod: RT | Performed by: ANESTHESIOLOGY

## 2025-02-18 PROCEDURE — 63600175 PHARM REV CODE 636 W HCPCS: Performed by: ANESTHESIOLOGY

## 2025-02-18 PROCEDURE — 64635 DESTROY LUMB/SAC FACET JNT: CPT | Mod: RT | Performed by: ANESTHESIOLOGY

## 2025-02-18 RX ORDER — BUPIVACAINE HYDROCHLORIDE 5 MG/ML
INJECTION, SOLUTION EPIDURAL; INTRACAUDAL
Status: DISCONTINUED | OUTPATIENT
Start: 2025-02-18 | End: 2025-02-18 | Stop reason: HOSPADM

## 2025-02-18 RX ORDER — ESCITALOPRAM OXALATE 10 MG/1
10 TABLET ORAL DAILY
Qty: 30 TABLET | Refills: 3 | Status: SHIPPED | OUTPATIENT
Start: 2025-02-18 | End: 2025-06-18

## 2025-02-18 RX ORDER — FENTANYL CITRATE 50 UG/ML
INJECTION, SOLUTION INTRAMUSCULAR; INTRAVENOUS
Status: DISCONTINUED | OUTPATIENT
Start: 2025-02-18 | End: 2025-02-18 | Stop reason: HOSPADM

## 2025-02-18 RX ORDER — DEXAMETHASONE SODIUM PHOSPHATE 4 MG/ML
INJECTION, SOLUTION INTRA-ARTICULAR; INTRALESIONAL; INTRAMUSCULAR; INTRAVENOUS; SOFT TISSUE
Status: DISCONTINUED | OUTPATIENT
Start: 2025-02-18 | End: 2025-02-18 | Stop reason: HOSPADM

## 2025-02-18 RX ORDER — MIDAZOLAM HYDROCHLORIDE 5 MG/ML
INJECTION INTRAMUSCULAR; INTRAVENOUS
Status: DISCONTINUED | OUTPATIENT
Start: 2025-02-18 | End: 2025-02-18 | Stop reason: HOSPADM

## 2025-02-18 RX ORDER — SODIUM CHLORIDE, SODIUM LACTATE, POTASSIUM CHLORIDE, CALCIUM CHLORIDE 600; 310; 30; 20 MG/100ML; MG/100ML; MG/100ML; MG/100ML
INJECTION, SOLUTION INTRAVENOUS CONTINUOUS
Status: DISCONTINUED | OUTPATIENT
Start: 2025-02-18 | End: 2025-02-18 | Stop reason: HOSPADM

## 2025-02-18 RX ORDER — LIDOCAINE HYDROCHLORIDE 20 MG/ML
INJECTION, SOLUTION INFILTRATION; PERINEURAL
Status: DISCONTINUED | OUTPATIENT
Start: 2025-02-18 | End: 2025-02-18 | Stop reason: HOSPADM

## 2025-02-18 NOTE — H&P
"FOLLOW UP NOTE:     CHIEF COMPLAINT: back pain    INTERVAL HISTORY OF PRESENT ILLNESS: Saima Winkler is a 54 y.o. female who presents for LUMBAR RFA RIGHT L4-5, L5-S1 . The patient denies of any significant changes in her health since her last appointment. The patient also denies of any changes in the character of her pain since her last appointment.     ROS:  Review of Systems   Constitutional:  Negative for chills and fever.   HENT:  Negative for sore throat.    Eyes:  Negative for visual disturbance.   Respiratory:  Negative for shortness of breath.    Cardiovascular:  Negative for chest pain.   Gastrointestinal:  Negative for nausea and vomiting.   Genitourinary:  Negative for difficulty urinating.   Musculoskeletal:  Positive for arthralgias and back pain.   Skin:  Negative for rash.   Allergic/Immunologic: Negative for immunocompromised state.   Neurological:  Negative for syncope.   Hematological:  Does not bruise/bleed easily.   Psychiatric/Behavioral:  Negative for suicidal ideas.         MEDICAL, SURGICAL, FAMILY, SOCIAL HX: reviewed    MEDICATIONS/ALLERGIES: reviewed    PHYSICAL EXAM:    VITALS: Vitals reviewed.   Vitals:    02/18/25 1026   BP: 139/73   Pulse: 69   Resp: 16   Temp: 97.6 °F (36.4 °C)   TempSrc: Temporal   SpO2: 100%   Weight: 69 kg (152 lb 1.9 oz)   Height: 5' 7" (1.702 m)       Physical Exam  Vitals and nursing note reviewed.   Constitutional:       Appearance: Normal appearance. She is not toxic-appearing or diaphoretic.   HENT:      Head: Normocephalic and atraumatic.   Eyes:      General:         Right eye: No discharge.         Left eye: No discharge.      Extraocular Movements: Extraocular movements intact.      Conjunctiva/sclera: Conjunctivae normal.   Cardiovascular:      Rate and Rhythm: Normal rate.   Pulmonary:      Effort: Pulmonary effort is normal. No respiratory distress.      Breath sounds: Normal breath sounds.   Abdominal:      Palpations: Abdomen is soft.   Skin:     " General: Skin is warm and dry.      Findings: No rash.   Neurological:      Mental Status: She is alert and oriented to person, place, and time.   Psychiatric:         Mood and Affect: Mood and affect normal.         Cognition and Memory: Memory normal.         Judgment: Judgment normal.          EXTREMITIES:    Gen: No cyanosis, edema, varicosities, or tenderness to palpation BLE   Skin: Warm, pink, dry, no rashes, no lesions BLE   Strength: 5/5 motor strength BLE   ROM: hips, knees and ankles without pain or instability.     NEUROLOGICAL:    Gen: No clonus or spasticity.   Gait: Normal without antalgic lean   DTR's: 2+ in bilateral patellar, and ankle   BABINSKI: Absent bilaterally  Sensory: Intact to light touch and proprioception BLE    ASSESSMENT: Saima Winkler is a 54 y.o. female who presents for LUMBAR RFA RIGHT L4-5, L5-S1 .     PLAN:  Proceed with LUMBAR RFA RIGHT L4-5, L5-S1  as previously discussed.    This patient has been cleared for surgery in an ambulatory surgical facility    ASA 3,  Mallampatti Score 3  No history of anesthetic complications  Plan for RN IV sedation    Payton Aguillon MD  Pain Management

## 2025-02-18 NOTE — DISCHARGE INSTRUCTIONS
Dr. Aguillon [phone] extension [phone]  [facility] [phone]  [facility] [phone]      You may remove the dressing tomorrow.  You may shower after the dressing is removed.  Do not submerge in any water for the next 48 hours. (Bath, swimming, hot tub etc.)

## 2025-02-18 NOTE — OP NOTE
PROCEDURE DATE: 2/18/2025    PROCEDURE:  Radiofrequency ablation of the L3, L4, and L5 medial branch nerves on the right-side utilizing fluoroscopy    DIAGNOSIS:  Other lumbar spondylosis  Post op Diagnosis: Same    PHYSICIAN: Payton Aguillon MD    MEDICATIONS INJECTED:  From a mixture of 2 ml of 0.5% bupivicaine and 10 mg of dexamethasone,  1ml of this solution was injected at each level.    LOCAL ANESTHETIC USED: Lidocaine 1%, 2 ml given at each site.    SEDATION MEDICATIONS: RN IV sedation    ESTIMATED BLOOD LOSS:  none    COMPLICATIONS:  none    TECHNIQUE:  A time out was taken to identify patient and procedure side prior to starting the procedure. Laying in a prone position, the patient was prepped and draped in the usual sterile fashion using ChloraPrep and sterile towels.  The levels were determined under fluoroscopic guidance and then marked.  Local anesthetic was given by raising a wheal at the skin over each site and then infiltrated approximately 2cm deeper.  A 20-gauge  100 mm RF needle was introduced to the anatomic location of the right L3, L4, and L5 medial branch nerves. Motor stimulation up to 2 Volts at each level confirmed no motor nerve involvement.  Impedance was less than 800 ohms at each level.  1ml of 2% lidocaine was instilled prior to lesioning.  Ablation was performed per level utilizing radiofrequency generator 80°C for 105 seconds.  Needles were then rotated 90° and a second lesion was performed.  The above noted medication was then injected slowly. The patient tolerated the procedure well.     The patient was monitored after the procedure.  Patient was given post procedure and discharge instructions to follow at home.  The patient was discharged in a stable condition

## 2025-02-18 NOTE — PLAN OF CARE
Discharge instructions reviewed with pt and spouse. V/u. All questions answered. Copy of discharge paperwork provided.

## 2025-02-18 NOTE — PROGRESS NOTES
Subjective:       Patient ID: Saima Winkler is a 54 y.o. female.    Chief Complaint: Anxiety    HPI    Past Medical History:   Diagnosis Date    Anxiety     Depression      Social History[1]  Past Surgical History:   Procedure Laterality Date    HYSTERECTOMY      INJECTION, FACET JOINT, LUMBOSACRAL Bilateral 12/17/2024    Procedure: LUMBAR MBB BILATERAL L3, L4, L5;  Surgeon: Payton Aguillon MD;  Location: [facility] OR;  Service: Pain Management;  Laterality: Bilateral;    INJECTION, FACET JOINT, LUMBOSACRAL Bilateral 12/17/2024    Procedure: INJECTION, FACET JOINT, LUMBOSACRAL, 2ND LEVEL;  Surgeon: Payton Aguillon MD;  Location: [facility] OR;  Service: Pain Management;  Laterality: Bilateral;    INJECTION, FACET JOINT, LUMBOSACRAL Bilateral 2/4/2025    Procedure: LUMBAR MBB BILATERAL L3, L4, L5;  Surgeon: Payton Aguillon MD;  Location: [facility];  Service: Pain Management;  Laterality: Bilateral;    INJECTION, FACET JOINT, LUMBOSACRAL Bilateral 2/4/2025    Procedure: INJECTION, FACET JOINT, LUMBOSACRAL, 2ND LEVEL;  Surgeon: Payton Aguillon MD;  Location: [facility] OR;  Service: Pain Management;  Laterality: Bilateral;    INJECTION, SACROILIAC JOINT Bilateral 10/8/2024    Procedure: SI JOINT INJECTION BILATERAL;  Surgeon: Payton Aguillon MD;  Location: [facility];  Service: Pain Management;  Laterality: Bilateral;    KNEE SURGERY      ROBOTIC ARTHROPLASTY, KNEE Right 9/19/2022    Procedure: ROBOTIC ARTHROPLASTY, KNEE, TOTAL;  Surgeon: Eduar Whittington MD;  [facility];  Service: Orthopedics;  Laterality: Right;    SHOULDER SURGERY      TRANSFORAMINAL EPIDURAL INJECTION OF STEROID Bilateral 6/4/2019    Procedure: Injection,steroid,epidural,transforaminal approach L4-5;  Surgeon: Jeyson Kent MD;  Location: [facility] OR;  Service: Pain Management;  Laterality: Bilateral;    TRANSFORAMINAL EPIDURAL INJECTION OF STEROID Bilateral 7/25/2019    Procedure: Injection,steroid,epidural,transforaminal approach;  Surgeon: Jeyson Kent MD;   "Location: [facility] OR;  Service: Pain Management;  Laterality: Bilateral;  L4-5    vaginal sling       Family History   Problem Relation Name Age of Onset    Asthma Mother Kathi Triplett     Depression Mother Kathi Triplett     Hypertension Mother Kathi Triplett     Heart disease Father Ramesh Triplett         CHF    Depression Father Ramesh Triplett     Hypertension Father Ramesh Triplett     Rheum arthritis Sister Kylee     Psoriasis Sister Kylee     Thyroid disease Sister Kylee         Hypothyroidism    Heart disease Brother Ramesh Triplett         Sick sinus syndrome status post pacemaker placement.    Obesity Brother Ramesh Triplett         Morbid obesity and status post gastric sleeve surgery.    Sleep apnea Brother Ramesh Triplett     No Known Problems Brother Gian Triplett        Review of Systems      Objective:      Blood pressure 139/78, pulse 75, height 5' 7" (1.702 m), weight 70.3 kg (155 lb). Body mass index is 24.28 kg/m².  Physical Exam      Assessment:       No visits with results within 3 Month(s) from this visit.   Latest known visit with results is:   Office Visit on 03/24/2024   Component Date Value Ref Range Status    Molecular Strep A, POC 03/24/2024 Negative  Negative Final     Acceptable 03/24/2024 Yes   Final    SARS Coronavirus 2 Antigen 03/24/2024 Negative  Negative Final     Acceptable 03/24/2024 Yes   Final       1. Generalized anxiety disorder  -     Ambulatory referral/consult to Psychology; Future; Expected date: 02/25/2025  -     EScitalopram oxalate (LEXAPRO) 10 MG tablet; Take 1 tablet (10 mg total) by mouth once daily.  Dispense: 30 tablet; Refill: 3    2. Recurrent major depressive disorder, in full remission  -     Ambulatory referral/consult to Psychology; Future; Expected date: 02/25/2025  -     EScitalopram oxalate (LEXAPRO) 10 MG tablet; Take 1 tablet (10 mg total) by mouth once daily.  Dispense: 30 tablet; Refill: 3             Plan: " "  Generalized anxiety disorder  -     Ambulatory referral/consult to Psychology; Future; Expected date: 2025  -     EScitalopram oxalate (LEXAPRO) 10 MG tablet; Take 1 tablet (10 mg total) by mouth once daily.  Dispense: 30 tablet; Refill: 3    Recurrent major depressive disorder, in full remission  -     Ambulatory referral/consult to Psychology; Future; Expected date: 2025  -     EScitalopram oxalate (LEXAPRO) 10 MG tablet; Take 1 tablet (10 mg total) by mouth once daily.  Dispense: 30 tablet; Refill: 3        Follow up in about 3 months (around 2025), or if symptoms worsen or fail to improve, for Depression.    Current Medications[2]    Gerardo Buchanan           [1]   Social History  Socioeconomic History    Marital status:      Spouse name: Stacy Garcia (Ken)    Number of children: 3   Occupational History    Occupation: Registered Nurse      Employer: [employer]     Comment: [employer]   Tobacco Use    Smoking status: Former     Current packs/day: 0.00     Types: Cigarettes     Quit date: 2007     Years since quittin.1    Smokeless tobacco: Never   Substance and Sexual Activity    Alcohol use: Yes     Comment: Social occasional drinks    Drug use: No    Sexual activity: Yes     Partners: Male     Birth control/protection: Other-see comments     Comment: Status post hysterectomy simple.  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  She has 3 children age 34, 30and 24     Social Drivers of Health     Financial Resource Strain: Low Risk  (2024)    Overall Financial Resource Strain (CARDIA)     Difficulty of Paying Living Expenses: Not hard at all   Food Insecurity: No Food Insecurity (2024)    Hunger Vital Sign     Worried About Running Out of Food in the Last Year: Never true     Ran Out of Food in the Last Year: Never true   Transportation Needs: No Transportation Needs " (4/1/2024)    PRAPARE - Transportation     Lack of Transportation (Medical): No     Lack of Transportation (Non-Medical): No   Physical Activity: Insufficiently Active (4/1/2024)    Exercise Vital Sign     Days of Exercise per Week: 1 day     Minutes of Exercise per Session: 20 min   Stress: Stress Concern Present (4/1/2024)    [place] [employer] - Occupational Stress Questionnaire     Feeling of Stress : To some extent   Housing Stability: Low Risk  (4/1/2024)    Housing Stability Vital Sign     Unable to Pay for Housing in the Last Year: No     Number of Places Lived in the Last Year: 1     Unstable Housing in the Last Year: No   [2]   Current Outpatient Medications:     albuterol (PROAIR HFA) 90 mcg/actuation inhaler, Inhale 2 puffs into the lungs every 6 (six) hours as needed for Wheezing. Rescue, Disp: 8 g, Rfl: 1    baclofen (LIORESAL) 10 MG tablet, TAKE 1 TABLET BY MOUTH EVERY EVENING THANK YOU!, Disp: 30 tablet, Rfl: 5    calcium carbonate/vitamin D3 (CALTRATE 600 + D ORAL), , Disp: , Rfl:     cetirizine (ZYRTEC) 10 MG tablet, Take 10 mg by mouth once daily., Disp: , Rfl:     estradioL (ESTRACE) 1 MG tablet, TAKE 1 TABLET BY MOUTH EVERY DAY THANK YOU!, Disp: 90 tablet, Rfl: 1    fluticasone propionate (FLONASE) 50 mcg/actuation nasal spray, 1 spray (50 mcg total) by Each Nostril route once daily., Disp: 9.9 mL, Rfl: 0    magnesium hydroxide 400 mg/5 ml (MILK OF MAGNESIA) 400 mg/5 mL Susp, , Disp: , Rfl:     meclizine (ANTIVERT) 12.5 mg tablet, Take one or two every 6 hours for dizziness, Disp: 60 tablet, Rfl: 5    meloxicam (MOBIC) 15 MG tablet, TAKE 1 TABLET BY MOUTH EVERY DAY THANK YOU!, Disp: 30 tablet, Rfl: 5    pregabalin (LYRICA) 50 MG capsule, TAKE 1 CAPSULE BY MOUTH THREE TIMES DAILY *THANK YOU!*, Disp: 270 capsule, Rfl: 1    EScitalopram oxalate (LEXAPRO) 10 MG tablet, Take 1 tablet (10 mg total) by mouth once daily., Disp: 30 tablet, Rfl: 3  No current facility-administered  medications for this visit.

## 2025-02-18 NOTE — DISCHARGE SUMMARY
[facility] - Surgery  Discharge Note  Short Stay    Procedure(s) (LRB):  LUMBAR RFA RIGHT L4-5, L5-S1 (Right)  RADIOFREQUENCY ABLATION, NERVE, SPINAL, LUMBOSACRAL, EACH ADD'L LEVEL (Right)      OUTCOME: Patient tolerated treatment/procedure well without complication and is now ready for discharge.    DISPOSITION: Home or Self Care    FINAL DIAGNOSIS: Other spondylosis, lumbar    FOLLOWUP: In clinic    DISCHARGE INSTRUCTIONS:    Discharge Procedure Orders   Diet general     Call MD for:  temperature >100.4     Call MD for:  persistent nausea and vomiting     Call MD for:  severe uncontrolled pain     Call MD for:  difficulty breathing, headache or visual disturbances     Call MD for:  redness, tenderness, or signs of infection (pain, swelling, redness, odor or green/yellow discharge around incision site)     Call MD for:  hives     Call MD for:  persistent dizziness or light-headedness     Call MD for:  extreme fatigue        TIME SPENT ON DISCHARGE: 30 minutes

## 2025-02-19 NOTE — PROGRESS NOTES
"Subjective:       Patient ID: Saima Winkler is a 54 y.o. female.    Chief Complaint: Anxiety    History of Present Illness    CHIEF COMPLAINT:  Saima presents for a referral to counseling and to discuss anxiety and family relationship issues.    HPI:  Saima reports ongoing anxiety issues causing her to overreact to situations compared to others. She describes a recent family conflict involving her son and daughter-in-law in [place], which has exacerbated her anxiety. Her daughter-in-law reportedly dislikes her and has accused her of breaking boundaries during visits. This situation has led to strained relationships, with her son distancing himself from the family, causing her significant distress and sleep difficulties. Ms Henrry Daughter-in-law who seems to be domineering the relationship with patient's son has cited patient's family being "toxic" and has tried several strategies and methods to distance her  from 's mother.  Please note this is as per the patient's information and no collateral information is available at this point.    She mentions having undergone a radiofrequency ablation (RFA) procedure earlier today for facet arthropathy in her spine. The procedure was performed by Dr. Fabian at [facility], targeting L3, L4, L5, and S1 on the right side. Following the procedure, she reports numbness in her leg and foot, as well as a sensation of her foot giving out. She is nervous about these symptoms but believes they may be related to the anesthesia used during the procedure.    She has a history of trying various antidepressants but feels she may not have given them enough time to work effectively. She expresses a desire for counseling to address her anxiety and family issues.  She had tried Lexapro only for weeks and for some on recalled side effects or concerns she stopped it.  After that she is on Cymbalta which package both the issues of pain and associated mood disorder but did " not do too well on either and she stopped that medication.      Currently her pain is reasonably well controlled    She denies any issues with her marriage, her relationship with her other children, or her work life.    MEDICATIONS:  Saima was on Lexapro 10 mg daily for anxiety/depression, which was  prescribed in 2016.  After that she had stopped the medications.  She has discontinued Cymbalta (duloxetine) due to side effects.    MEDICAL HISTORY:  Saima has a history of anxiety, degenerative joint disease (DJD) affecting multiple areas, and facet arthropathy in the spine.    FAMILY HISTORY:  Family history is significant for the patient's granddaughter (son's daughter) who has high functioning autism and is hyperlexic.    SURGICAL HISTORY:  She underwent radiofrequency ablation (RFA) today on the right side of her spine, targeting the L3, L4, L5, and S1 levels for facet arthropathy. An upcoming RFA procedure is scheduled in 2 weeks for the left side of her spine.    TEST RESULTS:  Saima underwent radiofrequency ablation (RFA) today on the right side of her spine, targeting the L3, L4, L5, and S1 levels. She is scheduled for another RFA procedure in 2 weeks on the left side of her spine.    SOCIAL HISTORY:  Occupation: Employed full-time at cancer center  Ep[employer]: Religion    Marital status:       ROS:  Neurological: +numbness  Psychiatric: +anxiety, +sleep difficulty, -emotional lability         Past Medical History:   Diagnosis Date    Anxiety     Depression      Social History[1]  Past Surgical History:   Procedure Laterality Date    HYSTERECTOMY      INJECTION, FACET JOINT, LUMBOSACRAL Bilateral 12/17/2024    Procedure: LUMBAR MBB BILATERAL L3, L4, L5;  Surgeon: Payton Aguillon MD;  Location: [facility] OR;  Service: Pain Management;  Laterality: Bilateral;    INJECTION, FACET JOINT, LUMBOSACRAL Bilateral 12/17/2024    Procedure: INJECTION, FACET JOINT, LUMBOSACRAL, 2ND LEVEL;  Surgeon: Payton Aguillon  MD;  Location: [facility] OR;  Service: Pain Management;  Laterality: Bilateral;    INJECTION, FACET JOINT, LUMBOSACRAL Bilateral 2/4/2025    Procedure: LUMBAR MBB BILATERAL L3, L4, L5;  Surgeon: Payton Aguillon MD;  Location: [facility] OR;  Service: Pain Management;  Laterality: Bilateral;    INJECTION, FACET JOINT, LUMBOSACRAL Bilateral 2/4/2025    Procedure: INJECTION, FACET JOINT, LUMBOSACRAL, 2ND LEVEL;  Surgeon: Payton Aguillon MD;  Location: [facility] OR;  Service: Pain Management;  Laterality: Bilateral;    INJECTION, SACROILIAC JOINT Bilateral 10/8/2024    Procedure: SI JOINT INJECTION BILATERAL;  Surgeon: Payton Aguillon MD;  Location: [facility] OR;  Service: Pain Management;  Laterality: Bilateral;    KNEE SURGERY      ROBOTIC ARTHROPLASTY, KNEE Right 9/19/2022    Procedure: ROBOTIC ARTHROPLASTY, KNEE, TOTAL;  Surgeon: Eduar Whittington MD;  Location: [facility] OR;  Service: Orthopedics;  Laterality: Right;    SHOULDER SURGERY      TRANSFORAMINAL EPIDURAL INJECTION OF STEROID Bilateral 6/4/2019    Procedure: Injection,steroid,epidural,transforaminal approach L4-5;  Surgeon: Jeyson Kent MD;  Location: [facility] OR;  Service: Pain Management;  Laterality: Bilateral;    TRANSFORAMINAL EPIDURAL INJECTION OF STEROID Bilateral 7/25/2019    Procedure: Injection,steroid,epidural,transforaminal approach;  Surgeon: Jeyson Kent MD;  Location: [facility];  Service: Pain Management;  Laterality: Bilateral;  L4-5    vaginal sling       Family History   Problem Relation Name Age of Onset    Asthma Mother Kathi Mcbridela     Depression Mother Kathi Triplett     Hypertension Mother Kathi Triplett     Heart disease Father Ramesh Triplett         CHF    Depression Father Ramesh Triplett     Hypertension Father Ramesh Triplett     Rheum arthritis Sister Kylee     Psoriasis Sister Kylee     Thyroid disease Sister Kylee         Hypothyroidism    Heart disease Brother Ramesh Triplett         Sick sinus syndrome status post pacemaker placement.     "Obesity Brother Ramesh Triplett         Morbid obesity and status post gastric sleeve surgery.    Sleep apnea Brother Ramesh Triplett     No Known Problems Brother Gian Triplett        Objective:      Physical Exam  Blood pressure 139/78, pulse 75, height 5' 7" (1.702 m), weight 70.3 kg (155 lb). Body mass index is 24.28 kg/m².  Physical Exam    General: No acute distress. Well-developed. Well-nourished.  Eyes: EOMI. Sclerae anicteric.  HENT: Normocephalic.   Cardiovascular: Regular rate. Regular rhythm. No murmurs. No rubs. No gallops. Normal S1, S2.  Respiratory: Normal respiratory effort. Clear to auscultation bilaterally. No rales. No rhonchi. No wheezing.  Abdomen: Soft. Non-tender. Non-distended.     Extremities: No lower extremity edema.  Neurological: Alert & oriented x3. No slurred speech.  Slightly cautious gait secondary to some pain and numbness in the right leg.  Psychiatric:  Cooperative and affable.  Somewhat anhedonic and dysthymic.  Skin: Warm. Dry. No rash.              Assessment:       No visits with results within 3 Month(s) from this visit.   Latest known visit with results is:   Office Visit on 03/24/2024   Component Date Value Ref Range Status    Molecular Strep A, POC 03/24/2024 Negative  Negative Final     Acceptable 03/24/2024 Yes   Final    SARS Coronavirus 2 Antigen 03/24/2024 Negative  Negative Final     Acceptable 03/24/2024 Yes   Final       Assessment & Plan    IMPRESSION:  - Assessed patient's anxiety and depression symptoms, considering impact of family relationship issues and chronic pain  - Evaluated effectiveness of previous antidepressant trials  - Considered restarting Lexapro for management of anxiety and depressive symptoms  - Noted recent lumbar radiofrequency ablation (RFA) procedure at L4, L5, S1 levels performed by Dr. Fabian  - Assessed post-procedure numbness in right leg and foot as likely temporary effect of anesthesia    F41.1 " GENERALIZED ANXIETY DISORDER:  - Assessed the patient's anxiety issues, including ruminating thoughts and sleep disturbances during pain flares.  - Evaluated the patient's experience of catecholamine release during anxiety episodes, causing inability to sleep and racing thoughts.  - Prescribed Lexapro 10 mg daily for anxiety and depression, providing 30 pills with refills.  - Referred the patient to Jolynn Nguyen, PhD psychologist at [facility] for counseling.  - Discussed coping strategies for managing anxiety related to family issues.  - Recommend yoga, exercises, stretching, and staying hydrated as additional treatments for anxiety.  - Instructed the patient to contact the office if experiencing increased stress or adverse effects from medication.  - Scheduled follow-up to assess Lexapro effectiveness and any side effects.    M15.0 PRIMARY GENERALIZED (OSTEO)ARTHRITIS:  - Noted the patient's report of degenerative joint disease (DJD) throughout the body, including facet arthropathy in the spine.  - Performed radiofrequency ablation on the right side of the spine at L3, L4, L5, S1 levels.  - Scheduled radiofrequency ablation for the left side of the spine in 2 weeks.    M47.816 SPONDYLOSIS WITHOUT MYELOPATHY OR RADICULOPATHY, LUMBAR REGION:  - Confirmed facet arthropathy in the spine, consistent with lumbar spondylosis.  - Performed radiofrequency ablation on the right side of the spine at L3, L4, L5, S1 levels as treatment for lumbar spondylosis.  - Scheduled radiofrequency ablation for the left side of the spine in 2 weeks.    G57.90 UNSPECIFIED MONONEUROPATHY OF UNSPECIFIED LOWER LIMB:  - Evaluated the patient's reported numbness in the leg and foot, and sensation of the foot giving out after the radiofrequency ablation procedure.  - Assessed that the numbness is likely a temporary effect of the procedure or anesthesia and not a cause for immediate concern.  - Advised the patient to monitor  symptoms and report any worsening or persistence of numbness.    Z63.1 PROBLEMS IN RELATIONSHIP WITH IN-LAWS:  - Discussed ongoing issues with the patient's daughter-in-law in [place], including perceived favoritism, boundary issues, and limited contact with her son and grandchild.  - Evaluated the complexity of the relationship dynamics and their impact on the patient's mental health.  - Discussed common relationship dynamics between mothers-in-law and daughters-in-law.  - Emphasized the importance of maintaining relationships with other family members despite current conflict.  - Recommend counseling to help the patient cope with family relationship issues.  - Advised the patient to focus on other relationships and accept the current situation while remaining open to future improvements.  - Suggested considering vacations with  for therapeutic benefit.         Plan:   Generalized anxiety disorder  -     Ambulatory referral/consult to Psychology; Future; Expected date: 02/25/2025  -     EScitalopram oxalate (LEXAPRO) 10 MG tablet; Take 1 tablet (10 mg total) by mouth once daily.  Dispense: 30 tablet; Refill: 3    Recurrent major depressive disorder, in full remission  -     Ambulatory referral/consult to Psychology; Future; Expected date: 02/25/2025  -     EScitalopram oxalate (LEXAPRO) 10 MG tablet; Take 1 tablet (10 mg total) by mouth once daily.  Dispense: 30 tablet; Refill: 3      Patient's psychosocial issues contributing to her overall health has been reviewed  Resume Lexapro at 10 mg with augmentation or supplementation subsequently based upon initial response in 2 or 3 weeks  Wellbutrin did not do too good on her previously which is more of a dopaminergic type of drug and did not suit her well.  Counseling also provided for several facets of self-management of stressful situations including confidence and self esteem building,  Complimented for her caring nature especially being a nurse  "confirmed that any adverse outcome with the patient's does not mentally set her back.  Formal referral given for behavioral mental therapy and counseling.  Consider update on immunizations  Issues of chronic pain also noted and she will keep follow up with pain management  Follow up in about 3 months (around 2025), or if symptoms worsen or fail to improve, for Depression.    Current Medications[2]    This note was generated with the assistance of ambient listening technology. Verbal consent was obtained by the patient and accompanying visitor(s) for the recording of patient appointment to facilitate this note. I attest to having reviewed and edited the generated note for accuracy, though some syntax or spelling errors may persist. Please contact the author of this note for any clarification.      Gerardo Buchanan           [1]   Social History  Socioeconomic History    Marital status:      Spouse name: Stacy Garcia (Ken)    Number of children: 3   Occupational History    Occupation: Registered Nurse      Employer: [employer]     Comment: [employer]   Tobacco Use    Smoking status: Former     Current packs/day: 0.00     Types: Cigarettes     Quit date: 2007     Years since quittin.1    Smokeless tobacco: Never   Substance and Sexual Activity    Alcohol use: Yes     Comment: Social occasional drinks    Drug use: No    Sexual activity: Yes     Partners: Male     Birth control/protection: Other-see comments     Comment: Status post hysterectomy simple.  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  She has 3 children age 34, 30and 24     Social Drivers of Health     Financial Resource Strain: Low Risk  (2024)    Overall Financial Resource Strain (CARDIA)     Difficulty of Paying Living Expenses: Not hard at all   Food Insecurity: No Food Insecurity (2024)    Hunger Vital Sign     Worried About " Running Out of Food in the Last Year: Never true     Ran Out of Food in the Last Year: Never true   Transportation Needs: No Transportation Needs (4/1/2024)    PRAPARE - Transportation     Lack of Transportation (Medical): No     Lack of Transportation (Non-Medical): No   Physical Activity: Insufficiently Active (4/1/2024)    Exercise Vital Sign     Days of Exercise per Week: 1 day     Minutes of Exercise per Session: 20 min   Stress: Stress Concern Present (4/1/2024)    [employer] - Occupational Stress Questionnaire     Feeling of Stress : To some extent   Housing Stability: Low Risk  (4/1/2024)    Housing Stability Vital Sign     Unable to Pay for Housing in the Last Year: No     Number of Places Lived in the Last Year: 1     Unstable Housing in the Last Year: No   [2]   Current Outpatient Medications:     albuterol (PROAIR HFA) 90 mcg/actuation inhaler, Inhale 2 puffs into the lungs every 6 (six) hours as needed for Wheezing. Rescue, Disp: 8 g, Rfl: 1    baclofen (LIORESAL) 10 MG tablet, TAKE 1 TABLET BY MOUTH EVERY EVENING THANK YOU!, Disp: 30 tablet, Rfl: 5    calcium carbonate/vitamin D3 (CALTRATE 600 + D ORAL), , Disp: , Rfl:     cetirizine (ZYRTEC) 10 MG tablet, Take 10 mg by mouth once daily., Disp: , Rfl:     estradioL (ESTRACE) 1 MG tablet, TAKE 1 TABLET BY MOUTH EVERY DAY THANK YOU!, Disp: 90 tablet, Rfl: 1    fluticasone propionate (FLONASE) 50 mcg/actuation nasal spray, 1 spray (50 mcg total) by Each Nostril route once daily., Disp: 9.9 mL, Rfl: 0    magnesium hydroxide 400 mg/5 ml (MILK OF MAGNESIA) 400 mg/5 mL Susp, , Disp: , Rfl:     meclizine (ANTIVERT) 12.5 mg tablet, Take one or two every 6 hours for dizziness, Disp: 60 tablet, Rfl: 5    meloxicam (MOBIC) 15 MG tablet, TAKE 1 TABLET BY MOUTH EVERY DAY THANK YOU!, Disp: 30 tablet, Rfl: 5    pregabalin (LYRICA) 50 MG capsule, TAKE 1 CAPSULE BY MOUTH THREE TIMES DAILY *THANK YOU!*, Disp: 270 capsule, Rfl: 1     EScitalopram oxalate (LEXAPRO) 10 MG tablet, Take 1 tablet (10 mg total) by mouth once daily., Disp: 30 tablet, Rfl: 3  No current facility-administered medications for this visit.

## 2025-03-11 ENCOUNTER — HOSPITAL ENCOUNTER (OUTPATIENT)
Facility: HOSPITAL | Age: 55
Discharge: HOME | End: 2025-03-11
Attending: ANESTHESIOLOGY | Admitting: ANESTHESIOLOGY
Payer: COMMERCIAL

## 2025-03-11 VITALS
DIASTOLIC BLOOD PRESSURE: 58 MMHG | HEIGHT: 67 IN | TEMPERATURE: 98 F | OXYGEN SATURATION: 98 % | RESPIRATION RATE: 15 BRPM | BODY MASS INDEX: 23.54 KG/M2 | SYSTOLIC BLOOD PRESSURE: 112 MMHG | HEART RATE: 80 BPM | WEIGHT: 150 LBS

## 2025-03-11 DIAGNOSIS — M47.896 OTHER SPONDYLOSIS, LUMBAR REGION: Primary | ICD-10-CM

## 2025-03-11 PROCEDURE — 63600175 PHARM REV CODE 636 W HCPCS: Performed by: ANESTHESIOLOGY

## 2025-03-11 PROCEDURE — 64635 DESTROY LUMB/SAC FACET JNT: CPT | Mod: LT | Performed by: ANESTHESIOLOGY

## 2025-03-11 PROCEDURE — 99152 MOD SED SAME PHYS/QHP 5/>YRS: CPT | Performed by: ANESTHESIOLOGY

## 2025-03-11 PROCEDURE — 64636 DESTROY L/S FACET JNT ADDL: CPT | Mod: LT | Performed by: ANESTHESIOLOGY

## 2025-03-11 RX ORDER — BUPIVACAINE HYDROCHLORIDE 5 MG/ML
INJECTION, SOLUTION EPIDURAL; INTRACAUDAL; PERINEURAL
Status: DISCONTINUED | OUTPATIENT
Start: 2025-03-11 | End: 2025-03-11 | Stop reason: HOSPADM

## 2025-03-11 RX ORDER — FENTANYL CITRATE 50 UG/ML
INJECTION, SOLUTION INTRAMUSCULAR; INTRAVENOUS
Status: DISCONTINUED | OUTPATIENT
Start: 2025-03-11 | End: 2025-03-11 | Stop reason: HOSPADM

## 2025-03-11 RX ORDER — DEXAMETHASONE SODIUM PHOSPHATE 4 MG/ML
INJECTION, SOLUTION INTRA-ARTICULAR; INTRALESIONAL; INTRAMUSCULAR; INTRAVENOUS; SOFT TISSUE
Status: DISCONTINUED | OUTPATIENT
Start: 2025-03-11 | End: 2025-03-11 | Stop reason: HOSPADM

## 2025-03-11 RX ORDER — LIDOCAINE HYDROCHLORIDE 20 MG/ML
INJECTION, SOLUTION INFILTRATION; PERINEURAL
Status: DISCONTINUED | OUTPATIENT
Start: 2025-03-11 | End: 2025-03-11 | Stop reason: HOSPADM

## 2025-03-11 RX ORDER — MIDAZOLAM HYDROCHLORIDE 5 MG/ML
INJECTION INTRAMUSCULAR; INTRAVENOUS
Status: DISCONTINUED | OUTPATIENT
Start: 2025-03-11 | End: 2025-03-11 | Stop reason: HOSPADM

## 2025-03-11 RX ORDER — SODIUM CHLORIDE, SODIUM LACTATE, POTASSIUM CHLORIDE, CALCIUM CHLORIDE 600; 310; 30; 20 MG/100ML; MG/100ML; MG/100ML; MG/100ML
INJECTION, SOLUTION INTRAVENOUS CONTINUOUS
Status: DISCONTINUED | OUTPATIENT
Start: 2025-03-11 | End: 2025-03-11 | Stop reason: HOSPADM

## 2025-03-11 NOTE — DISCHARGE SUMMARY
[facility] - Surgery  Discharge Note  Short Stay    Procedure(s) (LRB):  LUMBAR RFA LEFT L4-5, L5-S1 (Left)  RADIOFREQUENCY ABLATION, NERVE, SPINAL, LUMBOSACRAL, EACH ADD'L LEVEL (Left)      OUTCOME: Patient tolerated treatment/procedure well without complication and is now ready for discharge.    DISPOSITION: Home or Self Care    FINAL DIAGNOSIS: Other spondylosis, lumbar    FOLLOWUP: In clinic    DISCHARGE INSTRUCTIONS:    Discharge Procedure Orders   Diet general     Call MD for:  temperature >100.4     Call MD for:  persistent nausea and vomiting     Call MD for:  severe uncontrolled pain     Call MD for:  difficulty breathing, headache or visual disturbances     Call MD for:  redness, tenderness, or signs of infection (pain, swelling, redness, odor or green/yellow discharge around incision site)     Call MD for:  hives     Call MD for:  persistent dizziness or light-headedness     Call MD for:  extreme fatigue        TIME SPENT ON DISCHARGE: 30 minutes

## 2025-03-11 NOTE — H&P
"FOLLOW UP NOTE:     CHIEF COMPLAINT: back and hip pain    INTERVAL HISTORY OF PRESENT ILLNESS: Saima Winkler is a 54 y.o. female who presents for LUMBAR RFA LEFT L4-5, L5-S1 . The patient denies of any significant changes in her health since her last appointment. The patient also denies of any changes in the character of her pain since her last appointment.     ROS:  Review of Systems   Constitutional:  Negative for chills and fever.   HENT:  Negative for sore throat.    Eyes:  Negative for visual disturbance.   Respiratory:  Negative for shortness of breath.    Cardiovascular:  Negative for chest pain.   Gastrointestinal:  Negative for nausea and vomiting.   Genitourinary:  Negative for difficulty urinating.   Musculoskeletal:  Positive for arthralgias and back pain.   Skin:  Negative for rash.   Allergic/Immunologic: Negative for immunocompromised state.   Neurological:  Negative for syncope.   Hematological:  Does not bruise/bleed easily.   Psychiatric/Behavioral:  Negative for suicidal ideas.         MEDICAL, SURGICAL, FAMILY, SOCIAL HX: reviewed    MEDICATIONS/ALLERGIES: reviewed    PHYSICAL EXAM:    VITALS: Vitals reviewed.   Vitals:    03/11/25 1115   BP: 114/74   Pulse: 73   Resp: 16   Temp: 97.6 °F (36.4 °C)   TempSrc: Temporal   SpO2: 100%   Weight: 68 kg (150 lb)   Height: 5' 7" (1.702 m)       Physical Exam  Vitals and nursing note reviewed.   Constitutional:       Appearance: Normal appearance. She is not toxic-appearing or diaphoretic.   HENT:      Head: Normocephalic and atraumatic.   Eyes:      General:         Right eye: No discharge.         Left eye: No discharge.      Extraocular Movements: Extraocular movements intact.      Conjunctiva/sclera: Conjunctivae normal.   Cardiovascular:      Rate and Rhythm: Normal rate.   Pulmonary:      Effort: Pulmonary effort is normal. No respiratory distress.      Breath sounds: Normal breath sounds.   Abdominal:      Palpations: Abdomen is soft.   Skin:     " General: Skin is warm and dry.      Findings: No rash.   Neurological:      Mental Status: She is alert and oriented to person, place, and time.   Psychiatric:         Mood and Affect: Mood and affect normal.         Cognition and Memory: Memory normal.         Judgment: Judgment normal.          EXTREMITIES:    Gen: No cyanosis, edema, varicosities, or tenderness to palpation BLE   Skin: Warm, pink, dry, no rashes, no lesions BLE   Strength: 5/5 motor strength BLE   ROM: hips, knees and ankles without pain or instability.     NEUROLOGICAL:    Gen: No clonus or spasticity.   Gait: Normal without antalgic lean   DTR's: 2+ in bilateral patellar, and ankle   BABINSKI: Absent bilaterally  Sensory: Intact to light touch and proprioception BLE    ASSESSMENT: Saima Winkler is a 54 y.o. female who presents for LUMBAR RFA LEFT L4-5, L5-S1 .     PLAN:  Proceed with LUMBAR RFA LEFT L4-5, L5-S1  as previously discussed.    This patient has been cleared for surgery in an ambulatory surgical facility    ASA 3,  Mallampatti Score 3  No history of anesthetic complications  Plan for RN IV sedation    Payton Aguillon MD  Pain Management

## 2025-03-11 NOTE — OP NOTE
PROCEDURE DATE: 3/11/2025    PROCEDURE:  Radiofrequency ablation of the L3, L4, and L5 medial branch nerves on the left-side utilizing fluoroscopy    DIAGNOSIS:  Other lumbar spondylosis  Post op Diagnosis: Same    PHYSICIAN: Payton Aguillon MD    MEDICATIONS INJECTED:  From a mixture of 2 ml of 0.5% bupivicaine and 10 mg of dexamethasone,  1ml of this solution was injected at each level.    LOCAL ANESTHETIC USED: Lidocaine 1%, 2 ml given at each site.    SEDATION MEDICATIONS: RN IV sedation    ESTIMATED BLOOD LOSS:  none    COMPLICATIONS:  none    TECHNIQUE:  A time out was taken to identify patient and procedure side prior to starting the procedure. Laying in a prone position, the patient was prepped and draped in the usual sterile fashion using ChloraPrep and sterile towels.  The levels were determined under fluoroscopic guidance and then marked.  Local anesthetic was given by raising a wheal at the skin over each site and then infiltrated approximately 2cm deeper.  A 20-gauge  100 mm RF needle was introduced to the anatomic location of the left L3, L4, and L5 medial branch nerves. Motor stimulation up to 2 Volts at each level confirmed no motor nerve involvement.  Impedance was less than 800 ohms at each level.  1ml of 2% lidocaine was instilled prior to lesioning.  Ablation was performed per level utilizing radiofrequency generator 80°C for 105 seconds.  Needles were then rotated 90° and a second lesion was performed.  The above noted medication was then injected slowly. The patient tolerated the procedure well.     The patient was monitored after the procedure.  Patient was given post procedure and discharge instructions to follow at home.  The patient was discharged in a stable condition

## 2025-03-11 NOTE — DISCHARGE INSTRUCTIONS
Dr. Aguillon [phone] extension [phone]  [facility] Room [phone]  [facility] Room [phone]      You may remove the dressing tomorrow.  You may shower after the dressing is removed.  Do not submerge in any water for the next 48 hours. (Bath, swimming, hot tub etc.)

## 2025-03-26 ENCOUNTER — PATIENT MESSAGE (OUTPATIENT)
Dept: FAMILY MEDICINE | Facility: CLINIC | Age: 55
End: 2025-03-26
Payer: COMMERCIAL

## 2025-04-10 DIAGNOSIS — M19.90 ARTHRITIS: ICD-10-CM

## 2025-04-10 DIAGNOSIS — Z96.651 STATUS POST TOTAL KNEE REPLACEMENT, RIGHT: ICD-10-CM

## 2025-04-10 DIAGNOSIS — N95.1 HOT FLASHES DUE TO MENOPAUSE: Chronic | ICD-10-CM

## 2025-04-10 RX ORDER — ESTRADIOL 1 MG/1
TABLET ORAL
Qty: 90 TABLET | Refills: 2 | Status: SHIPPED | OUTPATIENT
Start: 2025-04-10

## 2025-04-10 RX ORDER — MELOXICAM 15 MG/1
TABLET ORAL
Qty: 30 TABLET | Refills: 5 | Status: SHIPPED | OUTPATIENT
Start: 2025-04-10

## 2025-04-30 DIAGNOSIS — M19.90 ARTHRITIS: ICD-10-CM

## 2025-04-30 DIAGNOSIS — M79.7 FIBROMYALGIA: ICD-10-CM

## 2025-04-30 RX ORDER — PREGABALIN 50 MG/1
CAPSULE ORAL
Qty: 270 CAPSULE | Refills: 0 | Status: SHIPPED | OUTPATIENT
Start: 2025-04-30

## 2025-05-21 ENCOUNTER — PATIENT MESSAGE (OUTPATIENT)
Dept: FAMILY MEDICINE | Facility: CLINIC | Age: 55
End: 2025-05-21

## 2025-05-21 ENCOUNTER — OFFICE VISIT (OUTPATIENT)
Dept: FAMILY MEDICINE | Facility: CLINIC | Age: 55
End: 2025-05-21
Payer: COMMERCIAL

## 2025-05-21 VITALS
DIASTOLIC BLOOD PRESSURE: 83 MMHG | HEART RATE: 80 BPM | WEIGHT: 152.13 LBS | HEIGHT: 67 IN | BODY MASS INDEX: 23.88 KG/M2 | SYSTOLIC BLOOD PRESSURE: 151 MMHG

## 2025-05-21 DIAGNOSIS — F41.1 GENERALIZED ANXIETY DISORDER: Primary | ICD-10-CM

## 2025-05-21 DIAGNOSIS — R03.0 ELEVATED BP WITHOUT DIAGNOSIS OF HYPERTENSION: ICD-10-CM

## 2025-05-21 DIAGNOSIS — N95.1 HOT FLASHES DUE TO MENOPAUSE: ICD-10-CM

## 2025-05-21 DIAGNOSIS — M51.369 DEGENERATION OF INTERVERTEBRAL DISC OF LUMBAR REGION WITHOUT DISCOGENIC BACK PAIN OR LOWER EXTREMITY PAIN: ICD-10-CM

## 2025-05-21 DIAGNOSIS — R23.1 IDIOPATHIC OR PRIMARY LIVEDO RETICULARIS: ICD-10-CM

## 2025-05-21 PROCEDURE — 3079F DIAST BP 80-89 MM HG: CPT | Mod: CPTII,S$GLB,, | Performed by: INTERNAL MEDICINE

## 2025-05-21 PROCEDURE — G2211 COMPLEX E/M VISIT ADD ON: HCPCS | Mod: S$GLB,,, | Performed by: INTERNAL MEDICINE

## 2025-05-21 PROCEDURE — 99213 OFFICE O/P EST LOW 20 MIN: CPT | Mod: S$GLB,,, | Performed by: INTERNAL MEDICINE

## 2025-05-21 PROCEDURE — 1160F RVW MEDS BY RX/DR IN RCRD: CPT | Mod: CPTII,S$GLB,, | Performed by: INTERNAL MEDICINE

## 2025-05-21 PROCEDURE — 99999 PR PBB SHADOW E&M-EST. PATIENT-LVL III: CPT | Mod: PBBFAC,,, | Performed by: INTERNAL MEDICINE

## 2025-05-21 PROCEDURE — 3077F SYST BP >= 140 MM HG: CPT | Mod: CPTII,S$GLB,, | Performed by: INTERNAL MEDICINE

## 2025-05-21 PROCEDURE — 1159F MED LIST DOCD IN RCRD: CPT | Mod: CPTII,S$GLB,, | Performed by: INTERNAL MEDICINE

## 2025-05-21 PROCEDURE — 3008F BODY MASS INDEX DOCD: CPT | Mod: CPTII,S$GLB,, | Performed by: INTERNAL MEDICINE

## 2025-05-21 RX ORDER — ESCITALOPRAM OXALATE 5 MG/1
5 TABLET ORAL DAILY
COMMUNITY
End: 2025-05-21 | Stop reason: SDUPTHER

## 2025-05-21 RX ORDER — ESCITALOPRAM OXALATE 5 MG/1
5 TABLET ORAL NIGHTLY
Qty: 90 TABLET | Refills: 1 | Status: SHIPPED | OUTPATIENT
Start: 2025-05-21 | End: 2025-11-17

## 2025-05-21 RX ORDER — TRAMADOL HYDROCHLORIDE 50 MG/1
TABLET, FILM COATED ORAL
COMMUNITY

## 2025-05-21 NOTE — PROGRESS NOTES
"Subjective:       Patient ID: Saima Winkler is a 54 y.o. female.    Chief Complaint: Anxiety    History of Present Illness    CHIEF COMPLAINT:  Saima presents for a follow-up visit to discuss medication management, particularly Lexapro, and to address ongoing back pain and elevated blood pressure.    HPI:  Saima initially had difficulty with Lexapro causing excessive drowsiness. She adjusted her dosage by splitting the 10mg pill, taking 5mg in the morning and 5mg at night, which improved tolerability. Currently, she takes 5mg of Lexapro at night and reports improved symptoms with this regimen.    Saima has ongoing back pain related to degenerative disc disease. She underwent radio frequency ablation performed by Dr. Bosch at [place], targeting L3, L4, L5, and S1 bilaterally. She continues to have significant pain. A recent MRI revealed complete disc degeneration at L5-S1. Saima is considering a new procedure called "intercept" to address her back pain, which involves nerve ablation within the spine.    Saima's blood pressure is elevated today, measuring 142/91. She admits to infrequent home blood pressure monitoring but has noticed recent elevations in her readings. She acknowledges high soda consumption and poor salt intake control, which may contribute to the elevated readings.    Saima reports livedo reticularis and occasional Raynaud's phenomenon in response to cold. She has previously been evaluated by a rheumatologist for these issues and is currently attempting to schedule an appointment with Dr. Himanshu Greenwood, as her previous doctor suspects a mixed connective tissue disorder.    Saima reports ongoing stress related to family conflicts, particularly with her son and daughter-in-law. This situation has been a source of anxiety, which is partially managed by her Lexapro prescription and involvement in her Mosque community.    Saima denies radiation of back pain to the legs, sciatica, central " canal stenosis, abdominal pain, and persistent edema of the legs.    MEDICATIONS:  Saima is on Lexapro (Escitalopram) 5 mg, taken at night for anxiety, which has been effective with reduced side effects. She is also on Baclofen, Tramadol, Pregabalin, Meloxicam, and Estradiol 1 mg. Her Lexapro dosage was reduced from 10 mg to 5 mg due to sleepiness side effects, and the regimen was changed from 5 mg in the morning and 5 mg at night to only 5 mg at night.    MEDICAL HISTORY:  Saima has a history of anxiety, degenerative disc disease (L5-S1 with no disc remaining), lumbar radiculopathy, livedo reticularis, Raynaud's syndrome, and suspected mixed connective tissue disorder. Saima has received 3 doses of the COVID-19 vaccine. She has also received 2 doses of the shingles vaccine, with a severe reaction to the second dose including blood pressure drop and fainting. Saima has undergone a hysterectomy.    SURGICAL HISTORY:  Saima has undergone a hysterectomy. She also had radio frequency ablation performed by Dr. Bosch at [place], targeting L3, L4, L5, S1 on both sides.    IMAGING:  Saima had an MRI of the Lumbar Spine 4 years ago, which showed disc degeneration fr  om L5 to S1. A recent MRI of the Lumbar Spine revealed no disc left from L5 to S1, indicating severe degenerative changes.    SOCIAL HISTORY:  Taoist: Active in her [employer]astaf[employer]    ROS:  General: -fever, -chills, -fatigue, -weight gain, -weight loss  Cardiovascular: -chest pain, -palpitations, -lower extremity edema, +cold extremities  Respiratory: -cough, -shortness of breath  Gastrointestinal: -abdominal pain, -nausea, -vomiting, -diarrhea, -constipation, -blood in stool  Skin: -rash, -lesion  Neurological: -headache, -dizziness, -numbness, -tingling, +excessive drowsiness  Psychiatric: +anxiety, +excessive crying, +emotional lability  Musculoskeletal: +back pain, +joint pain         Past Medical History:   Diagnosis Date    Anxiety     Depression       Social History[1]  Past Surgical History:   Procedure Laterality Date    HYSTERECTOMY      INJECTION, FACET JOINT, LUMBOSACRAL Bilateral 12/17/2024    Procedure: LUMBAR MBB BILATERAL L3, L4, L5;  Surgeon: Payton Aguillon MD;  Location: [facility] OR;  Service: Pain Management;  Laterality: Bilateral;    INJECTION, FACET JOINT, LUMBOSACRAL Bilateral 12/17/2024    Procedure: INJECTION, FACET JOINT, LUMBOSACRAL, 2ND LEVEL;  Surgeon: Payton Aguillon MD;  Location: [facility] OR;  Service: Pain Management;  Laterality: Bilateral;    INJECTION, FACET JOINT, LUMBOSACRAL Bilateral 2/4/2025    Procedure: LUMBAR MBB BILATERAL L3, L4, L5;  Surgeon: Payton Aguillon MD;  Location: [facility] OR;  Service: Pain Management;  Laterality: Bilateral;    INJECTION, FACET JOINT, LUMBOSACRAL Bilateral 2/4/2025    Procedure: INJECTION, FACET JOINT, LUMBOSACRAL, 2ND LEVEL;  Surgeon: Payton Aguillon MD;  Location: [facility] OR;  Service: Pain Management;  Laterality: Bilateral;    INJECTION, SACROILIAC JOINT Bilateral 10/8/2024    Procedure: SI JOINT INJECTION BILATERAL;  Surgeon: Payton Aguillon MD;  Location: [facility] OR;  Service: Pain Management;  Laterality: Bilateral;    KNEE SURGERY      RADIOFREQUENCY ABLATION, NERVE, SPINAL, LUMBOSACRAL Right 2/18/2025    Procedure: LUMBAR RFA RIGHT L4-5, L5-S1;  Surgeon: Payton Aguillon MD;  Location: [facility] OR;  Service: Pain Management;  Laterality: Right;    RADIOFREQUENCY ABLATION, NERVE, SPINAL, LUMBOSACRAL Right 2/18/2025    Procedure: RADIOFREQUENCY ABLATION, NERVE, SPINAL, LUMBOSACRAL, EACH ADD'L LEVEL;  Surgeon: Payton Aguillon MD;  Location: [facility] OR;  Service: Pain Management;  Laterality: Right;    RADIOFREQUENCY ABLATION, NERVE, SPINAL, LUMBOSACRAL Left 3/11/2025    Procedure: LUMBAR RFA LEFT L4-5, L5-S1;  Surgeon: Pyaton Aguillon MD;  Location: [facility] OR;  Service: Pain Management;  Laterality: Left;    RADIOFREQUENCY ABLATION, NERVE, SPINAL, LUMBOSACRAL Left 3/11/2025    Procedure:  "RADIOFREQUENCY ABLATION, NERVE, SPINAL, LUMBOSACRAL, EACH ADD'L LEVEL;  Surgeon: Payton Aguillon MD;  Location: [facility] OR;  Service: Pain Management;  Laterality: Left;    ROBOTIC ARTHROPLASTY, KNEE Right 9/19/2022    Procedure: ROBOTIC ARTHROPLASTY, KNEE, TOTAL;  Surgeon: Eduar Whittington MD;  Location: [facility] OR;  Service: Orthopedics;  Laterality: Right;    SHOULDER SURGERY      TRANSFORAMINAL EPIDURAL INJECTION OF STEROID Bilateral 6/4/2019    Procedure: Injection,steroid,epidural,transforaminal approach L4-5;  Surgeon: Jeyson Kent MD;  Location: [facility] OR;  Service: Pain Management;  Laterality: Bilateral;    TRANSFORAMINAL EPIDURAL INJECTION OF STEROID Bilateral 7/25/2019    Procedure: Injection,steroid,epidural,transforaminal approach;  Surgeon: Jeyson Kent MD;  Location: [facility] OR;  Service: Pain Management;  Laterality: Bilateral;  L4-5    vaginal sling       Family History   Problem Relation Name Age of Onset    Asthma Mother Kathi Triplett     Depression Mother Kathi Triplett     Hypertension Mother Kathi Triplett     Heart disease Father Ramesh Triplett         CHF    Depression Father Ramesh Triplett     Hypertension Father Ramesh Triplett     Rheum arthritis Sister Klyee     Psoriasis Sister Kylee     Thyroid disease Sister Kylee         Hypothyroidism    Heart disease Brother Ramesh Triplett         Sick sinus syndrome status post pacemaker placement.    Obesity Brother Ramesh Triplett         Morbid obesity and status post gastric sleeve surgery.    Sleep apnea Brother Ramesh Triplett     No Known Problems Brother Gian Triplett        Objective:      Physical Exam  Blood pressure (!) (P) 145/85, pulse 80, height 5' 7" (1.702 m), weight 69 kg (152 lb 1.9 oz). Body mass index is 23.82 kg/m².  Physical Exam    Vitals: Blood pressure is somewhat elevated.  General: No acute distress. Well-developed. Well-nourished.  Eyes: EOMI. Sclerae anicteric.  Cardiovascular: Regular rate. Regular rhythm. No " murmurs. No rubs. No gallops. Normal S1, S2.  Respiratory: Normal respiratory effort. Clear to auscultation bilaterally. No rales. No rhonchi. No wheezing.  Musculoskeletal: No  obvious deformity.  Abdomen soft-no rigidity or rebound  Extremities: No lower extremity edema.  Neurological: Alert & oriented .  Psychiatric:  Upbeat and positive..  Skin: Warm. Livedo reticularis.                    Assessment:       No visits with results within 3 Month(s) from this visit.   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  - Reduced dose from 10 mg to 5 mg daily at bedtime due to previous drowsiness and sleepiness at higher dose.  - Instructed patient to continue taking medication at bedtime.  - Identified that anxiety stemmed from relationships with son and daughter-in-law who do not communicate with her.  - Declined to diagnose daughter-in-law's personality issues, though suspected personality or possessiveness issues.    M51.87 OTHER INTERVERTEBRAL DISC DISORDERS, LUMBOSACRAL REGION:  - Monitored terrible degenerative disc disease from L5 to S1 with no disc remaining.  - Saima experiences pain  but no sciatica or central canal stenosis.  - Reviewed chronic back pain management, including recent MRI findings.  - Considered intercept procedure as a potential new treatment option.  - Continued pain management with Baclofen, Tramadol, Pregabalin, and Meloxicam.    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 Himanshu Blevins now.  Diagnosis was fibromyalgia.      I73.00 RAYNAUD'S SYNDROME WITHOUT GANGRENE:  - Documented occasional Raynaud's phenomenon when exposed to cold temperatures, potentially related to suspected mixed connective tissue disorder.    M35.9 SYSTEMIC INVOLVEMENT OF CONNECTIVE TISSUE, UNSPECIFIED:  - Referred patient to Dr. Himanshu Greenwood for suspected mixed connective tissue disorder based on history of livedo reticularis and Raynaud's phenomenon.    I10 ESSENTIAL (PRIMARY) HYPERTENSION:  - Evaluated blood pressure, noting elevated office reading likely due to white coat effect.  - Instructed patient to check blood pressure at home when relaxed and send 2 best readings via digital format.    Z90.710 ACQUIRED ABSENCE OF BOTH CERVIX AND UTERUS:  - Continued estradiol 1 mg daily for post-hysterectomy management.  - Scheduled mammogram after September.    Z88.8 ALLERGY STATUS TO OTHER DRUGS, MEDICAMENTS AND BIOLOGICAL SUBSTANCES:  - Considered pneumonia vaccine for patient over 50 years of age.  - Deferred vaccination due to history of significant reaction to shingles vaccine that caused blood pressure to drop to 70/30.  - Will reconsider at next visit.         Plan:   Generalized anxiety disorder  Comments:  Reduced dose of Lexapro to 5 mg to minimize side effects.  This is effective for her.  Orders:  -     EScitalopram oxalate (LEXAPRO) 5 MG Tab; Take 1 tablet (5 mg total) by mouth every evening.  Dispense: 90 tablet;  Refill: 1    Elevated BP without diagnosis of hypertension    Hot flashes due to menopause  Comments:  Currently on estradiol 1 mg.  She does not smoke.  Please get regular mammograms and female examination.    Degeneration of intervertebral disc of lumbar region without discogenic back pain or lower extremity pain  Comments:  May go through another procedure in future.  Intracept    Idiopathic or primary livedo reticularis  Comments:  Cause unknown and attributed to Raynaud's.  Will check into details and she plans to see Dr Himanshu Blevins MD Rheumatology        Overall miss Landaverde is doing okay.  Initially she had some difficulty with the Lexapro which would make her somewhat drowsy and sleepy at 10 mg.  She had tried to make some adjustment with half a pill in the morning and half a pill in the evening which did work well on her and she just takes 5 mg of Lexapro at night which seems to help allay her anxiety.  Please note that the anxiety had stemmed from her relationships with her son and daughter-in-law who would not communicate with her.  The reason for that happening is still unclear but it is suspected that the daughter-in-law might have some personality issues or possessiveness issues.  No attempt to make a diagnosis is made at this point.  Her appointment for counseling on these issues was delayed beyond 1 year and she give it up.  Her blood pressures are somewhat elevated and will considered to be a white coat effects till she checks her blood pressures at home and sensory reading.  She does have back pain, knee pain and she might have a new procedure called intra septae to relieve her back pain.  She does take baclofen muscle relaxer, tramadol for pain and pregabalin/meloxicam.  She continues on estradiol supplementation  No specific diagnosis for libido reticularis on skin has been made from rheumatology notes in past.  She does plan to get another rheumatology evaluation locally.  I have seen  "diagnosis of fibromyalgia  thus far.  Follow up in about 6 months (around 2025), or if symptoms worsen or fail to improve, for Preventive Physical.    Current Medications[2]    This note was generated with the assistance of ambient listening technology. Verbal consent was obtained by the patient and accompanying visitor(s) for the recording of patient appointment to facilitate this note. I attest to having reviewed and edited the generated note for accuracy, though some syntax or spelling errors may persist. Please contact the author of this note for any clarification.      Gerardo Buchanan    Visit today included increased complexity associated with the care of the episodic problem HTN addressed and managing the longitudinal care of the patient due to the serious and/or complex managed problem(s) HTN.          [1]   Social History  Socioeconomic History    Marital status:      Spouse name: Stacy Garcia (Ken)    Number of children: 3   Occupational History    Occupation: Registered Nurse      Employer: [employer]     Comment: [employer]   Tobacco Use    Smoking status: Former     Current packs/day: 0.00     Types: Cigarettes     Quit date: 2007     Years since quittin.3    Smokeless tobacco: Never   Substance and Sexual Activity    Alcohol use: Yes     Comment: Social occasional drinks    Drug use: No    Sexual activity: Yes     Partners: Male     Birth control/protection: Other-see comments     Comment: Status post hysterectomy simple.  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  She has 3 children age 34, 30and 24     Social Drivers of Health     Financial Resource Strain: Low Risk  (2024)    Overall Financial Resource Strain (CARDIA)     Difficulty of Paying Living Expenses: Not hard at all   Food Insecurity: No Food Insecurity (2024)    Hunger Vital Sign     Worried About Running Out of " Food in the Last Year: Never true     Ran Out of Food in the Last Year: Never true   Transportation Needs: No Transportation Needs (4/1/2024)    PRAPARE - Transportation     Lack of Transportation (Medical): No     Lack of Transportation (Non-Medical): No   Physical Activity: Insufficiently Active (4/1/2024)    Exercise Vital Sign     Days of Exercise per Week: 1 day     Minutes of Exercise per Session: 20 min   Stress: Stress Concern Present (4/1/2024)    [facility]ational [facility] - Occupational Stress Questionnaire     Feeling of Stress : To some extent   Housing Stability: Low Risk  (4/1/2024)    Housing Stability Vital Sign     Unable to Pay for Housing in the Last Year: No     Number of Places Lived in the Last Year: 1     Unstable Housing in the Last Year: No   [2]   Current Outpatient Medications:     albuterol (PROAIR HFA) 90 mcg/actuation inhaler, Inhale 2 puffs into the lungs every 6 (six) hours as needed for Wheezing. Rescue, Disp: 8 g, Rfl: 1    baclofen (LIORESAL) 10 MG tablet, TAKE 1 TABLET BY MOUTH EVERY EVENING THANK YOU!, Disp: 30 tablet, Rfl: 5    calcium carbonate/vitamin D3 (CALTRATE 600 + D ORAL), , Disp: , Rfl:     cetirizine (ZYRTEC) 10 MG tablet, Take 10 mg by mouth once daily., Disp: , Rfl:     estradioL (ESTRACE) 1 MG tablet, TAKE 1 TABLET BY MOUTH EVERY DAY THANK YOU!, Disp: 90 tablet, Rfl: 2    fluticasone propionate (FLONASE) 50 mcg/actuation nasal spray, 1 spray (50 mcg total) by Each Nostril route once daily., Disp: 9.9 mL, Rfl: 0    magnesium hydroxide 400 mg/5 ml (MILK OF MAGNESIA) 400 mg/5 mL Susp, , Disp: , Rfl:     meclizine (ANTIVERT) 12.5 mg tablet, Take one or two every 6 hours for dizziness, Disp: 60 tablet, Rfl: 5    meloxicam (MOBIC) 15 MG tablet, TAKE 1 TABLET BY MOUTH EVERY DAY THANK YOU!, Disp: 30 tablet, Rfl: 5    pregabalin (LYRICA) 50 MG capsule, TAKE 1 CAPSULE BY MOUTH THREE TIMES DAILY *THANK YOU!*, Disp: 270 capsule, Rfl: 0    traMADoL (ULTRAM) 50 mg  tablet, TAKE 1 TABLET BY MOUTH EVERY 6 HOURS AS NEEDED THANK YOU!, Disp: , Rfl:     EScitalopram oxalate (LEXAPRO) 5 MG Tab, Take 1 tablet (5 mg total) by mouth every evening., Disp: 90 tablet, Rfl: 1

## 2025-08-18 DIAGNOSIS — M62.838 MUSCLE SPASM: ICD-10-CM

## 2025-08-18 DIAGNOSIS — M79.7 FIBROMYALGIA: ICD-10-CM

## 2025-08-18 RX ORDER — BACLOFEN 10 MG/1
TABLET ORAL
Qty: 30 TABLET | Refills: 5 | Status: SHIPPED | OUTPATIENT
Start: 2025-08-18

## 2025-09-03 DIAGNOSIS — M79.7 FIBROMYALGIA: ICD-10-CM

## 2025-09-03 DIAGNOSIS — M19.90 ARTHRITIS: ICD-10-CM

## 2025-09-03 RX ORDER — PREGABALIN 50 MG/1
CAPSULE ORAL
Qty: 270 CAPSULE | Refills: 0 | Status: SHIPPED | OUTPATIENT
Start: 2025-09-03

## (undated) DEVICE — KIT VIZADISC KNEE TRACKING

## (undated) DEVICE — PACK LOWER EXTREMITY

## (undated) DEVICE — CANNULA RADIOPAQUE 20G CURVED

## (undated) DEVICE — GLOVE SURGEONS ULTRA TOUCH 6.5

## (undated) DEVICE — CUBE COLD THERAPY POLAR CARE

## (undated) DEVICE — DRAPE THREE-QTR REINF 53X77IN

## (undated) DEVICE — TOWEL OR DISP STRL BLUE 4/PK

## (undated) DEVICE — STRAP OR TABLE 5IN X 72IN

## (undated) DEVICE — WRAP PROTECTIVE LEG POS STRL

## (undated) DEVICE — INTERPULSE SET

## (undated) DEVICE — NDL SAFETY 25G X 1.5 ECLIPSE

## (undated) DEVICE — APPLICATOR CHLORAPREP CLR 10.5

## (undated) DEVICE — KIT NERVE BLOCK PREP BAPTIST

## (undated) DEVICE — SPONGE BULKEE II ABSRB 6X6.75

## (undated) DEVICE — SOL IRR NACL .9% 3000ML

## (undated) DEVICE — NDL SPINAL 18GX3.5 SPINOCAN

## (undated) DEVICE — TUBING MINIBORE EXTENSION

## (undated) DEVICE — PIN BONE 3.2X140MM
Type: IMPLANTABLE DEVICE | Site: KNEE | Status: NON-FUNCTIONAL
Removed: 2022-09-19

## (undated) DEVICE — GLOVE SURG ULTRA TOUCH 7.5

## (undated) DEVICE — TAPE SILK 3IN

## (undated) DEVICE — APPLICATOR CHLORAPREP ORN 26ML

## (undated) DEVICE — BLADE SAW SGTL 21.2X85.5X1.2

## (undated) DEVICE — NDL SPINAL SPINOCAN 22GX3.5

## (undated) DEVICE — CANNULA SUPERSOFT CO2 7FT

## (undated) DEVICE — NDL BLUNT W/O FILTER 18GX1.5IN

## (undated) DEVICE — PAD ALCOHOL PREP LG STRL 2PLY

## (undated) DEVICE — SYR 50CC LL

## (undated) DEVICE — SUT STRATAFIX SPIRAL VIOLET

## (undated) DEVICE — PADDING WYTEX UNDRCST 6INX4YD

## (undated) DEVICE — NDL SPINAL 22GA 3.5 IN QUINCKE

## (undated) DEVICE — TUBE SUCTION YANKAUER HI CAP

## (undated) DEVICE — GLOVE SENSICARE PI SURG 7.5

## (undated) DEVICE — SYR DISP LL 5CC

## (undated) DEVICE — PAD GROUNDING DISPER ELECTRODE

## (undated) DEVICE — SUT MONOCRYL 3-0 PS-1

## (undated) DEVICE — SYR 3CC LUER LOC

## (undated) DEVICE — GLOVE SURG ULTRA TOUCH 6

## (undated) DEVICE — ADHESIVE MASTISOL VIAL 48/BX

## (undated) DEVICE — SUT FIBERWIRE 2 38 IN TAPER

## (undated) DEVICE — NDL SPINAL 25GX3.5 SPINOCAN

## (undated) DEVICE — TOGA FLYTE PEEL AWAY XLARGE

## (undated) DEVICE — DRAPE STERI INSTRUMENT 1018

## (undated) DEVICE — KIT DRAPE RIO ONE PIECE W/POCK

## (undated) DEVICE — KIT TRIATHLON CR FEM PREP SZ4

## (undated) DEVICE — BLADE SURG CARBON STEEL #10

## (undated) DEVICE — NDL HYPODERMIC BLUNT 18G 1.5IN

## (undated) DEVICE — PIN BONE 3.2X110MM
Type: IMPLANTABLE DEVICE | Site: KNEE | Status: NON-FUNCTIONAL
Removed: 2022-09-19

## (undated) DEVICE — CLOSURE SKIN STERI STRIP 1/2X4

## (undated) DEVICE — KIT CHECKPOINT TIBIAL

## (undated) DEVICE — BLADE MAKO STANDARD

## (undated) DEVICE — KIT TRIATHLON CR TIB PREP SZ3

## (undated) DEVICE — BNDG COFLEX FOAM LF2 ST 6X5YD

## (undated) DEVICE — COVER TABLE 44X90 STERILE

## (undated) DEVICE — DRAPE ORTH SPLIT 77X108IN

## (undated) DEVICE — GOWN TOGA SYS PEELWY ZIP 2 XL

## (undated) DEVICE — TOURNIQUET SB QC DP 30X4IN

## (undated) DEVICE — SYS LABEL CORRECT MED

## (undated) DEVICE — CATH URETHRAL 16FR RED

## (undated) DEVICE — PACK CUSTOM UNIV BASIN SLI

## (undated) DEVICE — GLOVE SURG ULTRA TOUCH 8.5

## (undated) DEVICE — KIT TRIATHLON TIBIAL SIZER

## (undated) DEVICE — GLOVE BIOGEL PIMICRO INDIC 6.5

## (undated) DEVICE — SET DECANTER MEDICHOICE

## (undated) DEVICE — DRAPE INCISE IOBAN 2 23X33IN

## (undated) DEVICE — SOL 9P NACL IRR PIC IL

## (undated) DEVICE — SUT STRATAFIX PDS 1 CTX 18IN

## (undated) DEVICE — MANIFOLD 4 PORT

## (undated) DEVICE — GLOVE BIOGEL PI ORTHO PRO 7.5

## (undated) DEVICE — LINER SUCTION 3000CC

## (undated) DEVICE — BANDAGE MATRIX HK LOOP 6IN 5YD

## (undated) DEVICE — ALCOHOL 70% ISOP RUBBING 4OZ

## (undated) DEVICE — SLEEVE SCD EXPRESS KNEE MEDIUM

## (undated) DEVICE — UNDERGLOVES BIOGEL PI SIZE 8.5

## (undated) DEVICE — MIXER BONE CEMENT

## (undated) DEVICE — SUT 2-0 VICRYL / SH (J417)

## (undated) DEVICE — DRAPE STERI-DRAPE 1000 17X11IN

## (undated) DEVICE — ELECTRODE REM PLYHSV RETURN 9

## (undated) DEVICE — BANDAGE ESMARK ELASTIC ST 6X9

## (undated) DEVICE — PACK BASIC